# Patient Record
Sex: MALE | Race: WHITE | NOT HISPANIC OR LATINO | Employment: OTHER | URBAN - METROPOLITAN AREA
[De-identification: names, ages, dates, MRNs, and addresses within clinical notes are randomized per-mention and may not be internally consistent; named-entity substitution may affect disease eponyms.]

---

## 2018-02-24 PROBLEM — E11.628 TYPE 2 DIABETES MELLITUS WITH RIGHT DIABETIC FOOT INFECTION: Status: ACTIVE | Noted: 2018-02-24

## 2018-02-24 PROBLEM — R68.89 SUSPECTED SOFT TISSUE INFECTION: Status: ACTIVE | Noted: 2018-02-24

## 2018-02-24 PROBLEM — L97.509 DIABETIC FOOT ULCER ASSOCIATED WITH TYPE 2 DIABETES MELLITUS: Status: ACTIVE | Noted: 2018-02-24

## 2018-02-24 PROBLEM — E11.621 DIABETIC FOOT ULCER ASSOCIATED WITH TYPE 2 DIABETES MELLITUS: Status: ACTIVE | Noted: 2018-02-24

## 2018-02-24 PROBLEM — L08.9 TYPE 2 DIABETES MELLITUS WITH RIGHT DIABETIC FOOT INFECTION: Status: ACTIVE | Noted: 2018-02-24

## 2018-02-26 PROBLEM — M86.271 SUBACUTE OSTEOMYELITIS OF RIGHT FOOT: Status: ACTIVE | Noted: 2018-02-26

## 2018-03-02 PROBLEM — L03.115 CELLULITIS OF RIGHT FOOT: Status: ACTIVE | Noted: 2018-02-24

## 2018-12-23 ENCOUNTER — HOSPITAL ENCOUNTER (INPATIENT)
Facility: HOSPITAL | Age: 58
LOS: 17 days | DRG: 853 | End: 2019-01-09
Attending: EMERGENCY MEDICINE | Admitting: HOSPITALIST
Payer: MEDICAID

## 2018-12-23 DIAGNOSIS — M86.171 ACUTE OSTEOMYELITIS OF TOE, RIGHT: ICD-10-CM

## 2018-12-23 DIAGNOSIS — L03.115 CELLULITIS OF RIGHT FOOT: ICD-10-CM

## 2018-12-23 DIAGNOSIS — A41.9 SEPSIS: ICD-10-CM

## 2018-12-23 DIAGNOSIS — E11.621 DIABETIC ULCER OF RIGHT GREAT TOE: ICD-10-CM

## 2018-12-23 DIAGNOSIS — L03.90 CELLULITIS, UNSPECIFIED CELLULITIS SITE: ICD-10-CM

## 2018-12-23 DIAGNOSIS — M86.271 SUBACUTE OSTEOMYELITIS OF RIGHT FOOT: ICD-10-CM

## 2018-12-23 DIAGNOSIS — L08.9 TYPE 2 DIABETES MELLITUS WITH RIGHT DIABETIC FOOT INFECTION: ICD-10-CM

## 2018-12-23 DIAGNOSIS — L97.519 DIABETIC ULCER OF RIGHT GREAT TOE: ICD-10-CM

## 2018-12-23 DIAGNOSIS — E11.628 TYPE 2 DIABETES MELLITUS WITH RIGHT DIABETIC FOOT INFECTION: ICD-10-CM

## 2018-12-23 DIAGNOSIS — M86.9 OSTEOMYELITIS: ICD-10-CM

## 2018-12-23 DIAGNOSIS — A41.9 SEPSIS, DUE TO UNSPECIFIED ORGANISM: Primary | ICD-10-CM

## 2018-12-23 PROBLEM — R79.89 ELEVATED LACTIC ACID LEVEL: Status: ACTIVE | Noted: 2018-12-23

## 2018-12-23 LAB
ALBUMIN SERPL BCP-MCNC: 3.1 G/DL
ALP SERPL-CCNC: 80 U/L
ALT SERPL W/O P-5'-P-CCNC: 16 U/L
ANION GAP SERPL CALC-SCNC: 11 MMOL/L
AST SERPL-CCNC: 19 U/L
BACTERIA #/AREA URNS HPF: ABNORMAL /HPF
BASOPHILS # BLD AUTO: 0.02 K/UL
BASOPHILS NFR BLD: 0.2 %
BILIRUB SERPL-MCNC: 0.6 MG/DL
BILIRUB UR QL STRIP: NEGATIVE
BUN SERPL-MCNC: 29 MG/DL
CALCIUM SERPL-MCNC: 9.3 MG/DL
CHLORIDE SERPL-SCNC: 104 MMOL/L
CLARITY UR: CLEAR
CO2 SERPL-SCNC: 20 MMOL/L
COLOR UR: YELLOW
CREAT SERPL-MCNC: 1.4 MG/DL
CRP SERPL-MCNC: 83 MG/L
CTP QC/QA: YES
DIFFERENTIAL METHOD: ABNORMAL
EOSINOPHIL # BLD AUTO: 0 K/UL
EOSINOPHIL NFR BLD: 0.1 %
ERYTHROCYTE [DISTWIDTH] IN BLOOD BY AUTOMATED COUNT: 12.8 %
ERYTHROCYTE [SEDIMENTATION RATE] IN BLOOD BY WESTERGREN METHOD: 58 MM/HR
EST. GFR  (AFRICAN AMERICAN): >60 ML/MIN/1.73 M^2
EST. GFR  (NON AFRICAN AMERICAN): 55 ML/MIN/1.73 M^2
FLUAV AG NPH QL: NEGATIVE
FLUBV AG NPH QL: NEGATIVE
GLUCOSE SERPL-MCNC: 349 MG/DL
GLUCOSE UR QL STRIP: ABNORMAL
GRAN CASTS #/AREA URNS LPF: 1 /LPF
HCT VFR BLD AUTO: 37.9 %
HGB BLD-MCNC: 13.1 G/DL
HGB UR QL STRIP: ABNORMAL
HYALINE CASTS #/AREA URNS LPF: 3 /LPF
KETONES UR QL STRIP: NEGATIVE
LACTATE SERPL-SCNC: 2.1 MMOL/L
LEUKOCYTE ESTERASE UR QL STRIP: NEGATIVE
LYMPHOCYTES # BLD AUTO: 1.1 K/UL
LYMPHOCYTES NFR BLD: 8.4 %
MCH RBC QN AUTO: 29.4 PG
MCHC RBC AUTO-ENTMCNC: 34.6 G/DL
MCV RBC AUTO: 85 FL
MICROSCOPIC COMMENT: ABNORMAL
MONOCYTES # BLD AUTO: 1.3 K/UL
MONOCYTES NFR BLD: 10 %
NEUTROPHILS # BLD AUTO: 10.2 K/UL
NEUTROPHILS NFR BLD: 81.3 %
NITRITE UR QL STRIP: NEGATIVE
PH UR STRIP: 5 [PH] (ref 5–8)
PLATELET # BLD AUTO: 294 K/UL
PMV BLD AUTO: 10.2 FL
POCT GLUCOSE: 287 MG/DL (ref 70–110)
POCT GLUCOSE: 317 MG/DL (ref 70–110)
POTASSIUM SERPL-SCNC: 4.6 MMOL/L
PROT SERPL-MCNC: 7.4 G/DL
PROT UR QL STRIP: ABNORMAL
RBC # BLD AUTO: 4.46 M/UL
RBC #/AREA URNS HPF: 5 /HPF (ref 0–4)
SODIUM SERPL-SCNC: 135 MMOL/L
SP GR UR STRIP: 1.02 (ref 1–1.03)
SQUAMOUS #/AREA URNS HPF: 2 /HPF
URN SPEC COLLECT METH UR: ABNORMAL
UROBILINOGEN UR STRIP-ACNC: NEGATIVE EU/DL
WBC # BLD AUTO: 12.55 K/UL
WBC #/AREA URNS HPF: 3 /HPF (ref 0–5)
YEAST URNS QL MICRO: ABNORMAL

## 2018-12-23 PROCEDURE — 96361 HYDRATE IV INFUSION ADD-ON: CPT

## 2018-12-23 PROCEDURE — 80053 COMPREHEN METABOLIC PANEL: CPT

## 2018-12-23 PROCEDURE — 85025 COMPLETE CBC W/AUTO DIFF WBC: CPT

## 2018-12-23 PROCEDURE — 86140 C-REACTIVE PROTEIN: CPT

## 2018-12-23 PROCEDURE — 36415 COLL VENOUS BLD VENIPUNCTURE: CPT

## 2018-12-23 PROCEDURE — 87040 BLOOD CULTURE FOR BACTERIA: CPT

## 2018-12-23 PROCEDURE — 81000 URINALYSIS NONAUTO W/SCOPE: CPT

## 2018-12-23 PROCEDURE — 85652 RBC SED RATE AUTOMATED: CPT

## 2018-12-23 PROCEDURE — 99285 EMERGENCY DEPT VISIT HI MDM: CPT | Mod: 25

## 2018-12-23 PROCEDURE — 83605 ASSAY OF LACTIC ACID: CPT

## 2018-12-23 PROCEDURE — 11000001 HC ACUTE MED/SURG PRIVATE ROOM

## 2018-12-23 PROCEDURE — 93010 EKG 12-LEAD: ICD-10-PCS | Mod: ,,, | Performed by: INTERNAL MEDICINE

## 2018-12-23 PROCEDURE — 63600175 PHARM REV CODE 636 W HCPCS: Performed by: EMERGENCY MEDICINE

## 2018-12-23 PROCEDURE — 25000003 PHARM REV CODE 250: Performed by: EMERGENCY MEDICINE

## 2018-12-23 PROCEDURE — A4216 STERILE WATER/SALINE, 10 ML: HCPCS | Performed by: EMERGENCY MEDICINE

## 2018-12-23 PROCEDURE — 93005 ELECTROCARDIOGRAM TRACING: CPT

## 2018-12-23 PROCEDURE — 96375 TX/PRO/DX INJ NEW DRUG ADDON: CPT

## 2018-12-23 PROCEDURE — 93010 ELECTROCARDIOGRAM REPORT: CPT | Mod: ,,, | Performed by: INTERNAL MEDICINE

## 2018-12-23 PROCEDURE — 82962 GLUCOSE BLOOD TEST: CPT

## 2018-12-23 PROCEDURE — 96365 THER/PROPH/DIAG IV INF INIT: CPT

## 2018-12-23 RX ORDER — METFORMIN HYDROCHLORIDE 500 MG/1
500 TABLET ORAL 2 TIMES DAILY WITH MEALS
COMMUNITY
End: 2018-12-23

## 2018-12-23 RX ORDER — GABAPENTIN 300 MG/1
300 CAPSULE ORAL 3 TIMES DAILY
COMMUNITY
End: 2023-04-22 | Stop reason: CLARIF

## 2018-12-23 RX ORDER — ACETAMINOPHEN 325 MG/1
650 TABLET ORAL EVERY 8 HOURS PRN
Status: DISCONTINUED | OUTPATIENT
Start: 2018-12-23 | End: 2018-12-24

## 2018-12-23 RX ORDER — SODIUM CHLORIDE 0.9 % (FLUSH) 0.9 %
3 SYRINGE (ML) INJECTION EVERY 8 HOURS
Status: DISCONTINUED | OUTPATIENT
Start: 2018-12-23 | End: 2018-12-28

## 2018-12-23 RX ORDER — ONDANSETRON 8 MG/1
8 TABLET, ORALLY DISINTEGRATING ORAL EVERY 8 HOURS PRN
Status: DISCONTINUED | OUTPATIENT
Start: 2018-12-23 | End: 2019-01-09 | Stop reason: HOSPADM

## 2018-12-23 RX ORDER — TAMSULOSIN HYDROCHLORIDE 0.4 MG/1
0.4 CAPSULE ORAL DAILY
COMMUNITY
End: 2023-04-22 | Stop reason: CLARIF

## 2018-12-23 RX ORDER — LISINOPRIL 20 MG/1
20 TABLET ORAL DAILY
COMMUNITY
End: 2023-04-22 | Stop reason: CLARIF

## 2018-12-23 RX ADMIN — Medication 3 ML: at 10:12

## 2018-12-23 RX ADMIN — SODIUM CHLORIDE 2817 ML: 0.9 INJECTION, SOLUTION INTRAVENOUS at 07:12

## 2018-12-23 RX ADMIN — VANCOMYCIN HYDROCHLORIDE 2000 MG: 1 INJECTION, POWDER, LYOPHILIZED, FOR SOLUTION INTRAVENOUS at 08:12

## 2018-12-23 RX ADMIN — PIPERACILLIN AND TAZOBACTAM 4.5 G: 4; .5 INJECTION, POWDER, LYOPHILIZED, FOR SOLUTION INTRAVENOUS; PARENTERAL at 08:12

## 2018-12-24 ENCOUNTER — ANESTHESIA EVENT (OUTPATIENT)
Dept: SURGERY | Facility: HOSPITAL | Age: 58
DRG: 853 | End: 2018-12-24
Payer: MEDICAID

## 2018-12-24 ENCOUNTER — ANESTHESIA (OUTPATIENT)
Dept: SURGERY | Facility: HOSPITAL | Age: 58
DRG: 853 | End: 2018-12-24
Payer: MEDICAID

## 2018-12-24 PROBLEM — A41.9 SEPSIS: Status: ACTIVE | Noted: 2018-12-24

## 2018-12-24 PROBLEM — E66.01 MORBID OBESITY DUE TO EXCESS CALORIES: Status: ACTIVE | Noted: 2018-12-24

## 2018-12-24 LAB
ALBUMIN SERPL BCP-MCNC: 2.7 G/DL
ALP SERPL-CCNC: 73 U/L
ALT SERPL W/O P-5'-P-CCNC: 17 U/L
ANION GAP SERPL CALC-SCNC: 7 MMOL/L
AST SERPL-CCNC: 18 U/L
BASOPHILS # BLD AUTO: 0.04 K/UL
BASOPHILS NFR BLD: 0.4 %
BILIRUB SERPL-MCNC: 0.6 MG/DL
BUN SERPL-MCNC: 22 MG/DL
CALCIUM SERPL-MCNC: 8.8 MG/DL
CHLORIDE SERPL-SCNC: 107 MMOL/L
CO2 SERPL-SCNC: 23 MMOL/L
CREAT SERPL-MCNC: 1 MG/DL
DIFFERENTIAL METHOD: ABNORMAL
EOSINOPHIL # BLD AUTO: 0.1 K/UL
EOSINOPHIL NFR BLD: 0.8 %
ERYTHROCYTE [DISTWIDTH] IN BLOOD BY AUTOMATED COUNT: 12.8 %
EST. GFR  (AFRICAN AMERICAN): >60 ML/MIN/1.73 M^2
EST. GFR  (NON AFRICAN AMERICAN): >60 ML/MIN/1.73 M^2
GLUCOSE SERPL-MCNC: 251 MG/DL
HCT VFR BLD AUTO: 34.9 %
HGB BLD-MCNC: 11.5 G/DL
LACTATE SERPL-SCNC: 1.6 MMOL/L
LYMPHOCYTES # BLD AUTO: 2.4 K/UL
LYMPHOCYTES NFR BLD: 26.3 %
MCH RBC QN AUTO: 28.6 PG
MCHC RBC AUTO-ENTMCNC: 33 G/DL
MCV RBC AUTO: 87 FL
MONOCYTES # BLD AUTO: 1.2 K/UL
MONOCYTES NFR BLD: 12.8 %
NEUTROPHILS # BLD AUTO: 5.5 K/UL
NEUTROPHILS NFR BLD: 59.6 %
PLATELET # BLD AUTO: 297 K/UL
PMV BLD AUTO: 10.9 FL
POCT GLUCOSE: 141 MG/DL (ref 70–110)
POCT GLUCOSE: 229 MG/DL (ref 70–110)
POCT GLUCOSE: 299 MG/DL (ref 70–110)
POCT GLUCOSE: 304 MG/DL (ref 70–110)
POTASSIUM SERPL-SCNC: 3.5 MMOL/L
PROT SERPL-MCNC: 6.3 G/DL
RBC # BLD AUTO: 4.02 M/UL
SODIUM SERPL-SCNC: 137 MMOL/L
WBC # BLD AUTO: 9.14 K/UL

## 2018-12-24 PROCEDURE — 25000003 PHARM REV CODE 250: Performed by: EMERGENCY MEDICINE

## 2018-12-24 PROCEDURE — 63600175 PHARM REV CODE 636 W HCPCS: Performed by: HOSPITALIST

## 2018-12-24 PROCEDURE — A4216 STERILE WATER/SALINE, 10 ML: HCPCS | Performed by: EMERGENCY MEDICINE

## 2018-12-24 PROCEDURE — 88311 DECALCIFY TISSUE: CPT | Mod: 26,,, | Performed by: PATHOLOGY

## 2018-12-24 PROCEDURE — 87075 CULTR BACTERIA EXCEPT BLOOD: CPT

## 2018-12-24 PROCEDURE — 88305 TISSUE EXAM BY PATHOLOGIST: CPT | Mod: 26,,, | Performed by: PATHOLOGY

## 2018-12-24 PROCEDURE — 87206 SMEAR FLUORESCENT/ACID STAI: CPT

## 2018-12-24 PROCEDURE — 25000003 PHARM REV CODE 250: Performed by: NURSE ANESTHETIST, CERTIFIED REGISTERED

## 2018-12-24 PROCEDURE — D9220A PRA ANESTHESIA: Mod: CRNA,,, | Performed by: NURSE ANESTHETIST, CERTIFIED REGISTERED

## 2018-12-24 PROCEDURE — 37000009 HC ANESTHESIA EA ADD 15 MINS: Performed by: PODIATRIST

## 2018-12-24 PROCEDURE — 87070 CULTURE OTHR SPECIMN AEROBIC: CPT

## 2018-12-24 PROCEDURE — 63600175 PHARM REV CODE 636 W HCPCS: Performed by: INTERNAL MEDICINE

## 2018-12-24 PROCEDURE — 80053 COMPREHEN METABOLIC PANEL: CPT

## 2018-12-24 PROCEDURE — 37000008 HC ANESTHESIA 1ST 15 MINUTES: Performed by: PODIATRIST

## 2018-12-24 PROCEDURE — 87077 CULTURE AEROBIC IDENTIFY: CPT | Mod: 59

## 2018-12-24 PROCEDURE — 87116 MYCOBACTERIA CULTURE: CPT

## 2018-12-24 PROCEDURE — 28825 PR AMPUTATION TOE,I-P JT: ICD-10-PCS | Mod: T5,,, | Performed by: PODIATRIST

## 2018-12-24 PROCEDURE — 71000033 HC RECOVERY, INTIAL HOUR: Performed by: PODIATRIST

## 2018-12-24 PROCEDURE — 99233 PR SUBSEQUENT HOSPITAL CARE,LEVL III: ICD-10-PCS | Mod: 57,,, | Performed by: PODIATRIST

## 2018-12-24 PROCEDURE — 87147 CULTURE TYPE IMMUNOLOGIC: CPT

## 2018-12-24 PROCEDURE — 87205 SMEAR GRAM STAIN: CPT

## 2018-12-24 PROCEDURE — 36000707: Performed by: PODIATRIST

## 2018-12-24 PROCEDURE — D9220A PRA ANESTHESIA: Mod: ANES,,, | Performed by: ANESTHESIOLOGY

## 2018-12-24 PROCEDURE — 87186 SC STD MICRODIL/AGAR DIL: CPT | Mod: 59

## 2018-12-24 PROCEDURE — 11000001 HC ACUTE MED/SURG PRIVATE ROOM

## 2018-12-24 PROCEDURE — 85025 COMPLETE CBC W/AUTO DIFF WBC: CPT

## 2018-12-24 PROCEDURE — 36000706: Performed by: PODIATRIST

## 2018-12-24 PROCEDURE — 63600175 PHARM REV CODE 636 W HCPCS: Performed by: EMERGENCY MEDICINE

## 2018-12-24 PROCEDURE — D9220A PRA ANESTHESIA: ICD-10-PCS | Mod: CRNA,,, | Performed by: NURSE ANESTHETIST, CERTIFIED REGISTERED

## 2018-12-24 PROCEDURE — 63600175 PHARM REV CODE 636 W HCPCS: Performed by: NURSE ANESTHETIST, CERTIFIED REGISTERED

## 2018-12-24 PROCEDURE — 88305 TISSUE EXAM BY PATHOLOGIST: CPT | Performed by: PATHOLOGY

## 2018-12-24 PROCEDURE — 28825 PARTIAL AMPUTATION OF TOE: CPT | Mod: T5,,, | Performed by: PODIATRIST

## 2018-12-24 PROCEDURE — 88305 TISSUE SPECIMEN TO PATHOLOGY - SURGERY: ICD-10-PCS | Mod: 26,,, | Performed by: PATHOLOGY

## 2018-12-24 PROCEDURE — S5571 INSULIN DISPOS PEN 3 ML: HCPCS | Performed by: INTERNAL MEDICINE

## 2018-12-24 PROCEDURE — 25000003 PHARM REV CODE 250: Performed by: INTERNAL MEDICINE

## 2018-12-24 PROCEDURE — 99233 SBSQ HOSP IP/OBS HIGH 50: CPT | Mod: 57,,, | Performed by: PODIATRIST

## 2018-12-24 PROCEDURE — 25000003 PHARM REV CODE 250: Performed by: HOSPITALIST

## 2018-12-24 PROCEDURE — 88311 TISSUE SPECIMEN TO PATHOLOGY - SURGERY: ICD-10-PCS | Mod: 26,,, | Performed by: PATHOLOGY

## 2018-12-24 PROCEDURE — D9220A PRA ANESTHESIA: ICD-10-PCS | Mod: ANES,,, | Performed by: ANESTHESIOLOGY

## 2018-12-24 PROCEDURE — 36415 COLL VENOUS BLD VENIPUNCTURE: CPT

## 2018-12-24 PROCEDURE — 25000003 PHARM REV CODE 250: Performed by: PODIATRIST

## 2018-12-24 DEVICE — MATRIX REGENERATIVE CLARIX FLO: Type: IMPLANTABLE DEVICE | Site: TOE | Status: FUNCTIONAL

## 2018-12-24 RX ORDER — INSULIN ASPART 100 [IU]/ML
1-10 INJECTION, SOLUTION INTRAVENOUS; SUBCUTANEOUS EVERY 6 HOURS PRN
Status: DISCONTINUED | OUTPATIENT
Start: 2018-12-24 | End: 2018-12-29

## 2018-12-24 RX ORDER — GLYCOPYRROLATE 0.2 MG/ML
INJECTION INTRAMUSCULAR; INTRAVENOUS
Status: DISCONTINUED | OUTPATIENT
Start: 2018-12-24 | End: 2018-12-24

## 2018-12-24 RX ORDER — EPHEDRINE SULFATE 50 MG/ML
INJECTION, SOLUTION INTRAVENOUS
Status: DISCONTINUED | OUTPATIENT
Start: 2018-12-24 | End: 2018-12-24

## 2018-12-24 RX ORDER — SUCCINYLCHOLINE CHLORIDE 20 MG/ML
INJECTION INTRAMUSCULAR; INTRAVENOUS
Status: DISCONTINUED | OUTPATIENT
Start: 2018-12-24 | End: 2018-12-24

## 2018-12-24 RX ORDER — ROCURONIUM BROMIDE 10 MG/ML
INJECTION, SOLUTION INTRAVENOUS
Status: DISCONTINUED | OUTPATIENT
Start: 2018-12-24 | End: 2018-12-24

## 2018-12-24 RX ORDER — MIDAZOLAM HYDROCHLORIDE 1 MG/ML
INJECTION, SOLUTION INTRAMUSCULAR; INTRAVENOUS
Status: DISCONTINUED | OUTPATIENT
Start: 2018-12-24 | End: 2018-12-24

## 2018-12-24 RX ORDER — SODIUM CHLORIDE 9 MG/ML
INJECTION, SOLUTION INTRAVENOUS CONTINUOUS
Status: DISCONTINUED | OUTPATIENT
Start: 2018-12-24 | End: 2018-12-25

## 2018-12-24 RX ORDER — GLUCAGON 1 MG
1 KIT INJECTION
Status: DISCONTINUED | OUTPATIENT
Start: 2018-12-24 | End: 2019-01-09 | Stop reason: HOSPADM

## 2018-12-24 RX ORDER — ACETAMINOPHEN 500 MG
500 TABLET ORAL EVERY 6 HOURS PRN
Status: DISCONTINUED | OUTPATIENT
Start: 2018-12-24 | End: 2019-01-09 | Stop reason: HOSPADM

## 2018-12-24 RX ORDER — TRAMADOL HYDROCHLORIDE 50 MG/1
50 TABLET ORAL EVERY 6 HOURS PRN
Status: DISCONTINUED | OUTPATIENT
Start: 2018-12-24 | End: 2019-01-09 | Stop reason: HOSPADM

## 2018-12-24 RX ORDER — PROPOFOL 10 MG/ML
VIAL (ML) INTRAVENOUS
Status: DISCONTINUED | OUTPATIENT
Start: 2018-12-24 | End: 2018-12-24

## 2018-12-24 RX ORDER — ONDANSETRON 2 MG/ML
INJECTION INTRAMUSCULAR; INTRAVENOUS
Status: DISCONTINUED | OUTPATIENT
Start: 2018-12-24 | End: 2018-12-24

## 2018-12-24 RX ORDER — LIDOCAINE HCL/PF 100 MG/5ML
SYRINGE (ML) INTRAVENOUS
Status: DISCONTINUED | OUTPATIENT
Start: 2018-12-24 | End: 2018-12-24

## 2018-12-24 RX ORDER — PHENYLEPHRINE HYDROCHLORIDE 10 MG/ML
INJECTION INTRAVENOUS
Status: DISCONTINUED | OUTPATIENT
Start: 2018-12-24 | End: 2018-12-24

## 2018-12-24 RX ORDER — FENTANYL CITRATE 50 UG/ML
INJECTION, SOLUTION INTRAMUSCULAR; INTRAVENOUS
Status: DISCONTINUED | OUTPATIENT
Start: 2018-12-24 | End: 2018-12-24

## 2018-12-24 RX ORDER — METOCLOPRAMIDE HYDROCHLORIDE 5 MG/ML
INJECTION INTRAMUSCULAR; INTRAVENOUS
Status: DISCONTINUED | OUTPATIENT
Start: 2018-12-24 | End: 2018-12-24

## 2018-12-24 RX ORDER — NEOSTIGMINE METHYLSULFATE 1 MG/ML
INJECTION, SOLUTION INTRAVENOUS
Status: DISCONTINUED | OUTPATIENT
Start: 2018-12-24 | End: 2018-12-24

## 2018-12-24 RX ADMIN — FENTANYL CITRATE 25 MCG: 50 INJECTION INTRAMUSCULAR; INTRAVENOUS at 02:12

## 2018-12-24 RX ADMIN — METOCLOPRAMIDE 10 MG: 5 INJECTION, SOLUTION INTRAMUSCULAR; INTRAVENOUS at 02:12

## 2018-12-24 RX ADMIN — PIPERACILLIN AND TAZOBACTAM 4.5 G: 4; .5 INJECTION, POWDER, LYOPHILIZED, FOR SOLUTION INTRAVENOUS; PARENTERAL at 10:12

## 2018-12-24 RX ADMIN — GLYCOPYRROLATE 0.6 MG: 0.2 INJECTION, SOLUTION INTRAMUSCULAR; INTRAVENOUS at 03:12

## 2018-12-24 RX ADMIN — ACETAMINOPHEN 500 MG: 500 TABLET, FILM COATED ORAL at 10:12

## 2018-12-24 RX ADMIN — ROCURONIUM BROMIDE 5 MG: 10 INJECTION, SOLUTION INTRAVENOUS at 02:12

## 2018-12-24 RX ADMIN — PIPERACILLIN AND TAZOBACTAM 4.5 G: 4; .5 INJECTION, POWDER, LYOPHILIZED, FOR SOLUTION INTRAVENOUS; PARENTERAL at 12:12

## 2018-12-24 RX ADMIN — PHENYLEPHRINE HYDROCHLORIDE 200 MCG: 10 INJECTION INTRAVENOUS at 03:12

## 2018-12-24 RX ADMIN — Medication 3 ML: at 10:12

## 2018-12-24 RX ADMIN — VANCOMYCIN HYDROCHLORIDE 1500 MG: 1 INJECTION, POWDER, LYOPHILIZED, FOR SOLUTION INTRAVENOUS at 08:12

## 2018-12-24 RX ADMIN — SUCCINYLCHOLINE CHLORIDE 120 MG: 20 INJECTION, SOLUTION INTRAMUSCULAR; INTRAVENOUS at 02:12

## 2018-12-24 RX ADMIN — EPHEDRINE SULFATE 15 MG: 50 INJECTION, SOLUTION INTRAMUSCULAR; INTRAVENOUS; SUBCUTANEOUS at 02:12

## 2018-12-24 RX ADMIN — MIDAZOLAM HYDROCHLORIDE 2 MG: 1 INJECTION, SOLUTION INTRAMUSCULAR; INTRAVENOUS at 02:12

## 2018-12-24 RX ADMIN — EPHEDRINE SULFATE 10 MG: 50 INJECTION, SOLUTION INTRAMUSCULAR; INTRAVENOUS; SUBCUTANEOUS at 03:12

## 2018-12-24 RX ADMIN — Medication 3 ML: at 06:12

## 2018-12-24 RX ADMIN — EPHEDRINE SULFATE 10 MG: 50 INJECTION, SOLUTION INTRAMUSCULAR; INTRAVENOUS; SUBCUTANEOUS at 02:12

## 2018-12-24 RX ADMIN — ROCURONIUM BROMIDE 20 MG: 10 INJECTION, SOLUTION INTRAVENOUS at 02:12

## 2018-12-24 RX ADMIN — INSULIN ASPART 4 UNITS: 100 INJECTION, SOLUTION INTRAVENOUS; SUBCUTANEOUS at 12:12

## 2018-12-24 RX ADMIN — ONDANSETRON 4 MG: 2 INJECTION, SOLUTION INTRAMUSCULAR; INTRAVENOUS at 02:12

## 2018-12-24 RX ADMIN — INSULIN DETEMIR 15 UNITS: 100 INJECTION, SOLUTION SUBCUTANEOUS at 08:12

## 2018-12-24 RX ADMIN — PHENYLEPHRINE HYDROCHLORIDE 200 MCG: 10 INJECTION INTRAVENOUS at 02:12

## 2018-12-24 RX ADMIN — NEOSTIGMINE METHYLSULFATE 5 MG: 1 INJECTION INTRAVENOUS at 03:12

## 2018-12-24 RX ADMIN — FENTANYL CITRATE 25 MCG: 50 INJECTION INTRAMUSCULAR; INTRAVENOUS at 03:12

## 2018-12-24 RX ADMIN — INSULIN ASPART 8 UNITS: 100 INJECTION, SOLUTION INTRAVENOUS; SUBCUTANEOUS at 08:12

## 2018-12-24 RX ADMIN — SODIUM CHLORIDE: 0.9 INJECTION, SOLUTION INTRAVENOUS at 08:12

## 2018-12-24 RX ADMIN — EPHEDRINE SULFATE 5 MG: 50 INJECTION, SOLUTION INTRAMUSCULAR; INTRAVENOUS; SUBCUTANEOUS at 02:12

## 2018-12-24 RX ADMIN — PROPOFOL 180 MG: 10 INJECTION, EMULSION INTRAVENOUS at 02:12

## 2018-12-24 RX ADMIN — PIPERACILLIN AND TAZOBACTAM 4.5 G: 4; .5 INJECTION, POWDER, LYOPHILIZED, FOR SOLUTION INTRAVENOUS; PARENTERAL at 03:12

## 2018-12-24 RX ADMIN — LIDOCAINE HYDROCHLORIDE 100 MG: 20 INJECTION, SOLUTION INTRAVENOUS at 02:12

## 2018-12-24 NOTE — OP NOTE
Incision and Drainage with Distal Hallux Amputation    Surgery Date: 12/24/2018     Surgeon(s) and Role:     * Cary Villanueva DPM - Primary    Assisting Surgeon: None    Pre-op Diagnosis:  Cellulitis of right foot [L03.115]    Post-op Diagnosis:  Post-Op Diagnosis Codes:     * Cellulitis of right foot [L03.115]    Procedure(s) (LRB):  INCISION AND DRAINAGE (Right)  AMPUTATION, TOE PARTIAL RIGHT GREAT TOE (Right)    Anesthesia: Choice    Description of the findings of the procedure: purulence and soft bone of distal phalanx    Findings/Key Components: as above    Estimated Blood Loss:  Less than 5 mL         Specimens:   Specimen (12h ago, onward)    Start     Ordered    12/24/18 1459  Specimen to Pathology - Surgery  Once     Comments:  Right Distal Great ToeProximal Margin Right Great Toe     Start Status   12/24/18 1459 Collected (12/24/18 1459)       12/24/18 1459          Hemostasis: Pneumatic ankle tourniquet     Procedure in Detail:  The patient was seen in the Holding Room. The risks, benefits, complications, treatment options, and expected outcomes were discussed with the patient. The risks and potential complications of their problem and purposed treatment include but are not limited to infection, nerve injury, vascular injury, pain, potential skin necrosis, deep vein thrombosis, possible pulmonary embolus, complications of the anesthetics and need for further amputation.  The patient concurred with the proposed plan, giving informed consent.  The site of surgery properly noted/marked. The patient was taken to Operating Room #2 identified as Khahn Mandy and the procedure verified as incision and drainage of the  right foot with distal hallux amputaiton. A Time Out was held and the above information confirmed.    The patient was brought to the operating room, placed on the operating table in a supine position. Following the successful induction of anesthesia, the foot was then scrubbed, prepped, and  draped in the usual aseptic manner.  The stockinette was then reflected and attention was directed to the hallux  of the right foot where there was a full-thickness ulceration to the distal medial tip of the toe. The toe was ~1.5 times to the normal size and oozing purulence. The patient had dorsalis pedis and posterior tibial pulses that were found to be palpable bilaterally preoperatively. X-ray revealed  A #15 blade was used to make an incision down the bone in a transverse fashion just proximal to the head of the proximal phalanx. The incision was carried mediolaterally and plantarly encompassing the toe leaving a large amount of plantar skin intact. Next, the distal phalanx was disarticulated at the interphalangeal joint and removed. The distal toe was amputated and sent to laboratory for bone culture and sensitivity as well as tissue pathology. Next, the head of the proximal phalanx was inspected and found to be discolored on the distal lateral portion as suspected. Therefore, a bone cutter was used to resect approximately 0.75 cm of the distal aspect of head of the proximal phalanx. This bone was also sent off for path and was labeled proximal margin. Next, the flexor hallucis longus tendon was identified and retracted as far as possible distally and transected.  A hemostat was used to inspect the flexor sheath to ensure no infection tracking up the sheath proximally. None was found. No purulent drainage or abscess remained. The proximal margin of the surgical site tissue was viable and healthy. There was no malodor. Next, 1L of Hibiclens and impregnated saline were instilled into the wound.    All skin was reapproximated and coapted with care utilizing 3-0 prolene in a simple interrupted suture technique. Clarix Cam was injected to the site to decrease scar tissue formation and promote healing. 15 minutes on tourniquet which was deflated prior to closure in order to inspect for bleeders and abscess. The  patient tolerated the above anesthesia and surgery without apparent complications. A standard postoperative dressing was applied consisting of betadine soaked adaptic, gauze, 4x4s, Kerlix,  Cast padding and coban. The patient was transported via cart to Postanesthesia Care Unit with vital signs able and vascular status intact to foot.    He will be heel weightbearing to the right foot and use walker or crutch assistance. He will elevate his rightt foot and rest the foot, keep it clean and dry. If blood cultures are clean and patient remains afebrile, he can be discharged home from podiatric perspective on post op day 2. Podiatry will round on him POD 2 if he is still inpatient. Dressings are to be left clean, dry, and intact until this time.           Implants: none           Complications:  None; patient tolerated the procedure well.           Disposition: PACU - hemodynamically stable. Then back to floor           Condition: stable

## 2018-12-24 NOTE — PLAN OF CARE
Problem: Adult Inpatient Plan of Care  Goal: Plan of Care Review  Outcome: Ongoing (interventions implemented as appropriate)  Patient is A/Ox4, remains free from falls, is afebrile, states no pain.  Pt tolerating IV fluids well. Tolerating diet well, has enormous appetite, is not sated ever, and is difficult to redirect once he his eating.  Patient will eat to the point of choking and vomiting, and will not stop eating without physically removing tray.   Patient requires attention and assistance with meals.  RLE remains elevated with ice, cap refill <3.

## 2018-12-24 NOTE — ANESTHESIA PREPROCEDURE EVALUATION
12/24/2018  Khanh Galvan is a 58 y.o., male.    Anesthesia Evaluation    I have reviewed the Patient Summary Reports.    I have reviewed the Nursing Notes.   I have reviewed the Medications.     Review of Systems  Anesthesia Hx:  No problems with previous Anesthesia Denies Hx of Anesthetic complications  History of prior surgery of interest to airway management or planning: Denies Family Hx of Anesthesia complications.   Denies Personal Hx of Anesthesia complications.   Social:  Non-Smoker    Hematology/Oncology:         -- Anemia: Hematology Comments: Sepsis.   BP improved with fluids, abx.    Cardiovascular:   Hypertension ECG has been reviewed. Exercise tolerance unable to be assessed secondary to primary pathology.   No heart hx per patient.   EKG reviewed.     Musculoskeletal:   Osteomyelitis with sepsis.    Endocrine:   Diabetes, poorly controlled, using insulin        Physical Exam  General:  Well nourished, Obesity    Airway/Jaw/Neck:  Airway Findings: Mouth Opening: Normal Tongue: Normal  General Airway Assessment: Adult  Mallampati: II  TM Distance: Normal, at least 6 cm               Pre-operative evaluation for Procedure(s) (LRB):  INCISION AND DRAINAGE (Right)  AMPUTATION, TOE PARTIAL RIGHT GREAT TOE (Right)    Khanh Galvan is a 58 y.o. male     Patient Active Problem List   Diagnosis    Cellulitis of right foot    Type 2 diabetes mellitus with right diabetic foot infection    Subacute osteomyelitis of right foot    Elevated lactic acid level    Sepsis due to cellulitis    Morbid obesity due to excess calories       Review of patient's allergies indicates:  No Known Allergies    No current facility-administered medications on file prior to encounter.      Current Outpatient Medications on File Prior to Encounter   Medication Sig Dispense Refill    gabapentin (NEURONTIN) 300 MG capsule  Take 300 mg by mouth 3 (three) times daily.      insulin aspart U-100 (NOVOLOG) 100 unit/mL InPn pen Inject 10 Units into the skin 3 (three) times daily. (Patient taking differently: Inject into the skin 2 (two) times daily. ) 1 Box 0    insulin glargine,hum.rec.anlog (BASAGLAR KWIKPEN U-100 INSULIN SUBQ) Inject 40 Units into the skin 2 (two) times daily.       lisinopril (PRINIVIL,ZESTRIL) 20 MG tablet Take 20 mg by mouth once daily.      tamsulosin (FLOMAX) 0.4 mg Cap Take 0.4 mg by mouth once daily.      dextrose 50% injection Inject 50 mLs (25 g total) into the vein as needed (less than 50 mg/dL if patient cannnot take PO but has IV access.). 1 Syringe 10    glucose 4 GM chewable tablet Take 4 tablets (16 g total) by mouth as needed.  12    glucose 4 GM chewable tablet Take 6 tablets (24 g total) by mouth as needed.  12    polyethylene glycol (GLYCOLAX) 17 gram PwPk Take 17 g by mouth once daily. 1 packet 10       Past Surgical History:   Procedure Laterality Date    left foot         Social History     Socioeconomic History    Marital status: Single     Spouse name: Not on file    Number of children: Not on file    Years of education: Not on file    Highest education level: Not on file   Social Needs    Financial resource strain: Not on file    Food insecurity - worry: Not on file    Food insecurity - inability: Not on file    Transportation needs - medical: Not on file    Transportation needs - non-medical: Not on file   Occupational History    Not on file   Tobacco Use    Smoking status: Never Smoker    Smokeless tobacco: Never Used   Substance and Sexual Activity    Alcohol use: No     Comment: occasionally    Drug use: No    Sexual activity: No   Other Topics Concern    Not on file   Social History Narrative    Not on file         CBC:   Recent Labs     12/23/18  1904 12/24/18  0356   WBC 12.55 9.14   RBC 4.46* 4.02*   HGB 13.1* 11.5*   HCT 37.9* 34.9*    297   MCV 85 87    MCH 29.4 28.6   MCHC 34.6 33.0       CMP:   Recent Labs     12/23/18  1904 12/24/18  0356   * 137   K 4.6 3.5    107   CO2 20* 23   BUN 29* 22*   CREATININE 1.4 1.0   * 251*   CALCIUM 9.3 8.8   ALBUMIN 3.1* 2.7*   PROT 7.4 6.3   ALKPHOS 80 73   ALT 16 17   AST 19 18   BILITOT 0.6 0.6           Anesthesia Plan  Type of Anesthesia, risks & benefits discussed:  Anesthesia Type:  general  Patient's Preference:   Intra-op Monitoring Plan: standard ASA monitors  Intra-op Monitoring Plan Comments:   Post Op Pain Control Plan: multimodal analgesia, IV/PO Opioids PRN and per primary service following discharge from PACU  Post Op Pain Control Plan Comments:   Induction:   IV  Beta Blocker:  Patient is not currently on a Beta-Blocker (No further documentation required).       Informed Consent: Patient understands risks and agrees with Anesthesia plan.  Questions answered. Anesthesia consent signed with patient.  ASA Score: 2     Day of Surgery Review of History & Physical: I have interviewed and examined the patient. I have reviewed the patient's H&P dated: 12/24/18.    H&P completed by Anesthesiologist.   Anesthesia Plan Notes: POC glucose in +, will recheck treat intraoperatively/postoperatively.         Ready For Surgery From Anesthesia Perspective.

## 2018-12-24 NOTE — ED TRIAGE NOTES
Pt reports going down the stairs earlier this evening and began feeling weak and had to sit down.  Pt reports when he sat down he could not move his feet.  Pt also stubbed his right great toe on the stair today causing an open wound (1cm x 1.5cm) to bottom of toe. R great toe is swollen, red, with scaly skin peeling.  Pt reports wound to right great toe has only been there since today with swelling x3 days, and redness x 1 month. Redness to right foot marked with skin marker with time and date to monitor spreading.  Pt is poor historian, unable to obtain thorough history.  NAD.  VSS

## 2018-12-24 NOTE — PROGRESS NOTES
"Pt received to unit from ED via stretcher accompanied by transporter x 1 and son, Khanh. Pt AAOx4, respirations even and unlabored on RA. Marked and dated area of swelling and redness noted to R foot and lower leg. Site is warm, edematous, and tender to touch; it extends from R great toe across top of foot and an ulceration is present on the tip of the R great toe. CHANTE, no drainage at this time. Area of abrasion and redness also noted to tops of L toes and lateral L 5th toe. Pt is ambulatory with rolling walker at home. RLE elevated on pillows. Pt denies any pain, reports some peripheral neuropathy to BLE. IVs x 2 CDI, R hand infusing vancomycin. VSSAF. . [place] provided, pt states "I haven't eaten all day." Pt ate sandwich so quickly, he vomited a small amount. Educated pt on taking slow, small bites; pt verbalizes understanding.    Son at bedside, joined by daughter in law Viry. Family to remain at bedside overnight.     Bed locked and low, call bell in reach. Bed alarm active. Educated pt on fall risk and calling for assistance from staff before getting OOB; pt verbalizes understanding. Will continue to monitor.   "

## 2018-12-24 NOTE — ANESTHESIA POSTPROCEDURE EVALUATION
"Anesthesia Post Evaluation    Patient: Khanh Galvan    Procedure(s) Performed: Procedure(s) (LRB):  INCISION AND DRAINAGE (Right)  AMPUTATION, TOE PARTIAL RIGHT GREAT TOE (Right)    Final Anesthesia Type: general  Patient location during evaluation: PACU  Patient participation: Yes- Able to Participate  Level of consciousness: awake and alert  Post-procedure vital signs: reviewed and stable  Pain management: adequate  Airway patency: patent  PONV status at discharge: No PONV  Anesthetic complications: no      Cardiovascular status: blood pressure returned to baseline and hemodynamically stable  Respiratory status: unassisted and spontaneous ventilation  Hydration status: euvolemic  Follow-up not needed.        Visit Vitals  /71   Pulse 82   Temp 36.8 °C (98.2 °F) (Oral)   Resp 20   Ht 5' 11" (1.803 m)   Wt 120.8 kg (266 lb 6.4 oz)   SpO2 (!) 92%   BMI 37.16 kg/m²       Pain/Dell Score: Pain Rating Prior to Med Admin: 0 (12/24/2018  3:42 PM)  Pain Rating Post Med Admin: 0 (12/24/2018  3:42 PM)  Dell Score: 8 (12/24/2018  3:42 PM)        "

## 2018-12-24 NOTE — CONSULTS
"[facility] [place]  Podiatry  Consult Note    Patient Name: Khanh Galvan  MRN: [id number]  Admission Date: 12/23/2018  Hospital Length of Stay: 1 days  Attending Physician: Viry Sher MD  Primary Care Provider: JONY Pena     Inpatient consult to Podiatry  Consult performed by: Cary Villanueva DPM  Consult ordered by: Anthony Velasquez MD  Reason for consult: Diabetic foot lesion with osteomyelitis and cellulitis on right toe with systemic symptoms  Assessment/Recommendations: To OR this afternoon for R distal hallux amputation         Subjective:     History of Present Illness: Khanh Galvan is a 58 y.o. male  has a past medical history of Diabetes mellitus, Hypertension, and Subacute osteomyelitis. referred to podiatry for right hallux ulceration.  Patient is somewhat a poor historian, his daughter-in-law (DIL) aids in history. Patient presented to the ED via EMS following 2-3 days of fatigue, falls, weakness per DIL. They did not know origin of his symptoms, but relate that ~7 days ago he "stubbed his toes" on the stairs while barefoot walking.  DIL states that she noted yesterday that he had "dry blood" on his toes and his foot was red and more swollen than usual. Patient admits to barefoot walking often in and around home    Apparently, patient was recently taken out a nursing home by his son and DIL after ~ 6 months.  He had an ulceration to the plantar 1st MTPJ that required IV abx and wound care.    Since home DIL notices that the patient would get up in the middle of the night and "feast" on soft drinks, candy, chips.  She has recently began locking the fridge at night.  She relates that blood sugar was ranging range from 200-600mg/dL, however they had been working to get it better controlled until this week.          Scheduled Meds:   insulin detemir U-100  15 Units Subcutaneous Daily    piperacillin-tazobactam (ZOSYN) IVPB  4.5 g Intravenous Q8H    sodium chloride 0.9%  " 3 mL Intravenous Q8H    vancomycin (VANCOCIN) IVPB  1,500 mg Intravenous Q12H     Continuous Infusions:   sodium chloride 0.9% 75 mL/hr at 12/24/18 0848     PRN Meds:acetaminophen, dextrose 50%, glucagon (human recombinant), influenza, insulin aspart U-100, ondansetron, pneumoc 13-dutch conj-dip cr(PF)    Review of patient's allergies indicates:  No Known Allergies     Past Medical History:   Diagnosis Date    Diabetes mellitus     Hypertension     Subacute osteomyelitis      Past Surgical History:   Procedure Laterality Date    left foot         Family History     None        Tobacco Use    Smoking status: Never Smoker    Smokeless tobacco: Never Used   Substance and Sexual Activity    Alcohol use: No     Comment: occasionally    Drug use: No    Sexual activity: No     Review of Systems   Constitutional: Positive for fatigue. Negative for activity change, appetite change, chills and fever.   Respiratory: Negative for cough and shortness of breath.    Cardiovascular: Negative for chest pain and leg swelling.   Gastrointestinal: Negative for diarrhea, nausea and vomiting.   Musculoskeletal: Positive for gait problem and myalgias.   Skin: Positive for color change and wound.   Neurological: Positive for weakness and numbness.        + paresthesia      Objective:     Vital Signs (Most Recent):  Temp: 99.8 °F (37.7 °C) (12/24/18 0750)  Pulse: 87 (12/24/18 0750)  Resp: 18 (12/24/18 0750)  BP: (!) 119/59 (12/24/18 0750)  SpO2: 96 % (12/24/18 0750) Vital Signs (24h Range):  Temp:  [98.1 °F (36.7 °C)-99.8 °F (37.7 °C)] 99.8 °F (37.7 °C)  Pulse:  [] 87  Resp:  [18-20] 18  SpO2:  [94 %-96 %] 96 %  BP: (108-136)/(55-95) 119/59     Weight: 120.8 kg (266 lb 6.4 oz)  Body mass index is 37.16 kg/m².    General: Pt. is well-developed, well-nourished, appears stated age, in no acute distress, alert and oriented x 3. No evidence of depression, anxiety, or agitation. Calm, cooperative, and communicative. Appropriate  interactions and affect.    Lower Extremity Examination  Vascular: dorsalis pedis and posterior tibial pulses are palpable bilaterally. Capillary refill time is within normal limits. Edema to the right lower extremity    Neurologic: [place] 5.07 monofilamant testing is diminished [place] feet. Sharp/dull sensation diminished Bilaterally. Light touch diminished Bilaterally.    Musculoskeletal: There is equinus deformity bilateral with decreased dorsiflexion at the ankle joint bilateral. No tenderness with compression of heel. Negative tinels sign. Decreased first MPJ range of motion both weightbearing and nonweightbearing, no crepitus observed the first MP joint, + dorsal flag sign. Mild  bunion deformity is observed. Patient has hammertoes of digits 1-5 bilateral partially reducible     Lymphatics: no lymphangitic streaking bilaterally.    Dermatologic: Skin is warm, digital hair is absent. Abrasion noted to distal left 3rd digit    Ulcer location: right distal hallux as pictured  Signs of infection: local edema and erythema extending proximally as pictured, tender to palpation in the presence of neuropathy  Drainage: Sero- Purulent  Purulence: yes  Crepitus/fluctuance: yes  Periwound: Reddened, Macerated, Calloused  Base: Mixed Granular/Fibrotic  Depth: subcutaneous tissue              Laboratory:  A1C: No results for input(s): HGBA1C in the last 4320 hours.  CBC:   Recent Labs   Lab 12/24/18  0356   WBC 9.14   RBC 4.02*   HGB 11.5*   HCT 34.9*      MCV 87   MCH 28.6   MCHC 33.0     CMP:   Recent Labs   Lab 12/24/18  0356   *   CALCIUM 8.8   ALBUMIN 2.7*   PROT 6.3      K 3.5   CO2 23      BUN 22*   CREATININE 1.0   ALKPHOS 73   ALT 17   AST 18   BILITOT 0.6     CRP:   Recent Labs   Lab 12/23/18  1904   CRP 83.0*     ESR:   Recent Labs   Lab 12/23/18  1904   SEDRATE 58*     Microbiology Results (last 7 days)     Procedure Component Value Units Date/Time    Blood culture x two  cultures. Draw prior to antibiotics. [[id number]] Collected:  12/23/18 1850    Order Status:  Completed Specimen:  Blood from Peripheral, Hand, Left Updated:  12/24/18 0312     Blood Culture, Routine No Growth to date    Narrative:       Aerobic and anaerobic    Blood culture x two cultures. Draw prior to antibiotics. [[id number]] Collected:  12/23/18 1912    Order Status:  Completed Specimen:  Blood from Peripheral, Hand, Right Updated:  12/24/18 0312     Blood Culture, Routine No Growth to date    Narrative:       Aerobic and anaerobic        Specimen (12h ago, onward)    None          Diagnostic Results:  Imaging Results          X-Ray Foot Complete Right (Final result)  Result time 12/23/18 19:53:33    Final result by Rain Delgado MD (12/23/18 19:53:33)                 Impression:      See above.      Electronically signed by: Rain Delgado MD  Date:    12/23/2018  Time:    19:53             Narrative:    EXAMINATION:  XR FOOT COMPLETE 3 VIEW RIGHT    CLINICAL HISTORY:  . Osteomyelitis, unspecified    TECHNIQUE:  AP, lateral, and oblique views of the right foot were performed.    COMPARISON:  None.    FINDINGS:  No evidence of acute fracture or dislocation.  Small ulceration with suspected small amount of soft tissue air seen at the distal aspect of the great toe. No definite osseous erosive or destructive changes to suggest osteomyelitis.  Future MRI follow-up would be more sensitive for detection if clinical concern exists.                               X-Ray Chest AP Portable (Final result)  Result time 12/23/18 19:50:32    Final result by Rain Delgado MD (12/23/18 19:50:32)                 Impression:      Cardiomegaly with suspected mild pulmonary edema.      Electronically signed by: Rain Delgado MD  Date:    12/23/2018  Time:    19:50             Narrative:    EXAMINATION:  XR CHEST AP PORTABLE    CLINICAL HISTORY:  Sepsis;    TECHNIQUE:  Single frontal view of the chest was  performed.    COMPARISON:  03/02/2018.    FINDINGS:  Heart is enlarged but stable in size.  There is prominence of central pulmonary vasculature with mild perihilar opacity and diffuse increased interstitial attenuation most suggestive for mild pulmonary edema.  No evidence of focal consolidation, pneumothorax, or large effusion.  No acute osseous abnormality identified.                                Assessment/Plan:     Active Diagnoses:    Diagnosis Date Noted POA    PRINCIPAL PROBLEM:  Sepsis due to cellulitis [L03.90, A41.9] 12/23/2018 Yes    Morbid obesity due to excess calories [E66.01] 12/24/2018 Yes    Elevated lactic acid level [R79.89] 12/23/2018 Yes    Subacute osteomyelitis of right foot [M86.271] 02/26/2018 Yes    Cellulitis of right foot [L03.115] 02/24/2018 Yes    Type 2 diabetes mellitus with right diabetic foot infection [E11.628, L08.9] 02/24/2018 Yes      Problems Resolved During this Admission:       Greater than 50% of this ~ 32 minute encounter spent discussing differential diagnosis, treatment plan and options, all current labs and imaging.     Education about the prevention of limb loss.    Discussed wound healing cycle, skin integrity, ways to care for skin. Counseled patient and his family on the effects of  high blood glucose on healing. They verbalizes understanding that it can increase the chances of delayed healing and this prolonged exposure leads to infection or progression of infection which subsequently can result in loss of limb.    Adequate vitamin supplementation, protein intake, and hydration - discussed with patient    Full workup to rule out OM including ESR, CRP, and imaging.  Due to labs and clinical appearance of foot, I have high clinical suspicion of acute OM of the distal phalanx    Discussed options for treatment of likely acute OM. Two options were discussed with possible side effects of each. Distal toe amputation vs bone biopsy for C&S IV abx for six weeks  with aggressive wound care for several months with no guarantee of wound resolution or PO abx (if indicated based on culture) for six weeks with aggressive wound care for several months with no guarantee of wound resolution (discussed regular lab work involved with abx options).      Patient would like to proceed with distal amputation/excisional biopsy.  He request that even if proximal phalanx appears infected intraoperatively that I not remove it.  Patient understands that he may still require IV abx if bone still appears infected or proximal margins remain positive for OM.      Will consent prior to surgery (I want to give family time to discuss amputation to the level needed instead of being confined to distal phalanx).     We will continue to follow and work in partnership with treatment team on how to best treat patient concern and diagnosis.    Thank you for your consult.    Cary Villanueva DPM  Podiatry  [facility] [place]

## 2018-12-24 NOTE — H&P
[facility] Medicine  History & Physical    Patient Name: Khanh Galvan  MRN: [id number]  Admission Date: 12/24/2018  Attending Physician: Anthony Velasquez MD, MPH      PCP:     JONY Pena    CC:     Chief Complaint   Patient presents with    Fatigue     pt not feeling well, per EMS pt pale, diaphoretic BP 81/57  RA 89%        HISTORY OF PRESENT ILLNESS:     Khanh Galvan is a 58 y.o. male that (in part)  has a past medical history of Diabetes mellitus, Hypertension, and Subacute osteomyelitis.  has a past surgical history that includes left foot. Presents to [facility] Emergency Department by EMS who was called for fatigue.  When EMS arrived the patient was lethargic and nearly attended.  He was found to be hyperglycemic and hypotensive.  IV fluids were initiated and mental status improved along with his blood pressure.  He was found to have a right-sided diabetic ft lesion.  He reports sustaining injury from stubbing his right great toe on the stairs.  He developed a painful, red, swollen, and warm lesion to his right great toe that has worsened over the last 3 days.  Unfortunately the patient is a poor historian and history is limited.      In the emergency department routine laboratory studies were obtained and x-ray the right foot was performed.  He had elevated ESR, CRP, and physical exam was consistent with diabetic foot lesion with osteomyelitis.  He was given IV fluids and started on empiric antibiotic therapy due to associated cellulitis and concern for possible bacteremia with systemic infection causing infectious encephalopathy.    Hospital medicine has been asked to admit for further evaluation and treatment.       REVIEW OF SYSTEMS:     The available review of system is addressed in the HPI and is otherwise limited due to the condition of the patient.       PAST MEDICAL / SURGICAL HISTORY:     Past Medical History:   Diagnosis  Date    Diabetes mellitus     Hypertension     Subacute osteomyelitis      Past Surgical History:   Procedure Laterality Date    left foot           FAMILY HISTORY:     Unable to obtain family history due to current condition of the patient.      SOCIAL HISTORY:     Social History     Socioeconomic History    Marital status: Single     Spouse name: None    Number of children: None    Years of education: None    Highest education level: None   Social Needs    Financial resource strain: None    Food insecurity - worry: None    Food insecurity - inability: None    Transportation needs - medical: None    Transportation needs - non-medical: None   Occupational History    None   Tobacco Use    Smoking status: Never Smoker    Smokeless tobacco: Never Used   Substance and Sexual Activity    Alcohol use: No     Comment: occasionally    Drug use: No    Sexual activity: No   Other Topics Concern    None   Social History Narrative    None         ALLERGIES:       Review of patient's allergies indicates:  No Known Allergies      HEALTH SCREENING:     Influenza vaccine not up-to-date for this season.  Prevnar 13 pneumonia vaccine = no evidence of previous vaccination found in the medical record      HOME MEDICATIONS:     Prior to Admission medications    Medication Sig Start Date End Date Taking? Authorizing Provider   gabapentin (NEURONTIN) 300 MG capsule Take 300 mg by mouth 3 (three) times daily.   Yes Historical Provider, MD   insulin aspart U-100 (NOVOLOG) 100 unit/mL InPn pen Inject 10 Units into the skin 3 (three) times daily.  Patient taking differently: Inject into the skin 2 (two) times daily.  3/8/18 3/8/19 Yes Jarrell Jaimes MD   insulin glargine,hum.rec.anlog (BASAGLAR KWIKPEN U-100 INSULIN SUBQ) Inject 40 Units into the skin 2 (two) times daily.    Yes Historical Provider, MD   lisinopril (PRINIVIL,ZESTRIL) 20 MG tablet Take 20 mg by mouth once daily.   Yes Historical Provider, MD  "  tamsulosin (FLOMAX) 0.4 mg Cap Take 0.4 mg by mouth once daily.   Yes Historical Provider, MD   dextrose 50% injection Inject 50 mLs (25 g total) into the vein as needed (less than 50 mg/dL if patient cannnot take PO but has IV access.). 3/8/18   Jarrell Jaimes MD   glucose 4 GM chewable tablet Take 4 tablets (16 g total) by mouth as needed. 3/8/18 3/8/19  Jarrell Jaimes MD   glucose 4 GM chewable tablet Take 6 tablets (24 g total) by mouth as needed. 3/8/18 3/8/19  Jarrell Jaimes MD   polyethylene glycol (GLYCOLAX) 17 gram PwPk Take 17 g by mouth once daily. 3/8/18   Jarrell Jaimes MD          HOSPITAL MEDICATIONS:     Scheduled Meds:    piperacillin-tazobactam (ZOSYN) IVPB  4.5 g Intravenous Q8H    sodium chloride 0.9%  3 mL Intravenous Q8H    vancomycin (VANCOCIN) IVPB  1,500 mg Intravenous Q12H     Continuous Infusions:   PRN Meds: acetaminophen, influenza, ondansetron, pneumoc 13-dutch conj-dip cr(PF)      PHYSICAL EXAM:     Wt Readings from Last 1 Encounters:   12/23/18 2200 120.8 kg (266 lb 6.4 oz)   12/23/18 1816 93.9 kg (207 lb)     Body mass index is 37.16 kg/m².  Vitals:    12/23/18 2001 12/23/18 2101 12/23/18 2200 12/23/18 2342   BP: 121/63 114/62 (!) 135/55 108/60   BP Location:  Left arm Left arm Right arm   Patient Position:  Lying Lying Lying   Pulse: 104 104 100 96   Resp:  20 20 18   Temp:  98.5 °F (36.9 °C)  99.1 °F (37.3 °C)   TempSrc:  Oral Oral Oral   SpO2: 95% 95% 96% (!) 94%   Weight:   120.8 kg (266 lb 6.4 oz)    Height:   5' 11" (1.803 m)           -- General appearance: well developed. appears stated age   -- Head: normocephalic, atraumatic   -- Eyes: conjunctivae clear. Extraocular muscles intact  -- Nose: Nares normal. Septum midline.   -- Mouth/Throat: lips, mucosa, and tongue normal. no throat erythema.   -- Neck: supple, symmetrical, trachea midline, no JVD and thyroid not grossly enlarged, appears symmetric  -- Lungs: clear to auscultation bilaterally. normal " respiratory effort. No use of accessory muscles.   -- Chest wall: no tenderness. equal bilateral chest rise   -- Heart:  Rapid rate and regular rhythm. S1, S2 normal.  no click, rub or gallop   -- Abdomen:  Obesity, soft, non-tender, non-distended, non-tympanic; bowel sounds normal; megaly exam limited by body habitus  -- Extremities: no cyanosis, clubbing or edema.   -- Pulses: 2+ and symmetric   -- Skin:  1 cm x 1.5 cm at the 1st metatarsal on the right foot.  Consistent with diabetic foot ulcer.  color normal, texture normal, turgor normal.   -- Neurologic:  With arthritic/encephalopathic.  Globally decreased strength and normal muscle tone. No focal numbness or weakness. CNII-XII intact.     LABORATORY STUDIES:     Recent Results (from the past 36 hour(s))   Blood culture x two cultures. Draw prior to antibiotics.    Collection Time: 12/23/18  6:50 PM   Result Value Ref Range    Blood Culture, Routine No Growth to date    POCT glucose    Collection Time: 12/23/18  7:01 PM   Result Value Ref Range    POCT Glucose 317 (H) 70 - 110 mg/dL   CBC auto differential    Collection Time: 12/23/18  7:04 PM   Result Value Ref Range    WBC 12.55 3.90 - 12.70 K/uL    RBC 4.46 (L) 4.60 - 6.20 M/uL    Hemoglobin 13.1 (L) 14.0 - 18.0 g/dL    Hematocrit 37.9 (L) 40.0 - 54.0 %    MCV 85 82 - 98 fL    MCH 29.4 27.0 - 31.0 pg    MCHC 34.6 32.0 - 36.0 g/dL    RDW 12.8 11.5 - 14.5 %    Platelets 294 150 - 350 K/uL    MPV 10.2 9.2 - 12.9 fL    Gran # (ANC) 10.2 (H) 1.8 - 7.7 K/uL    Lymph # 1.1 1.0 - 4.8 K/uL    Mono # 1.3 (H) 0.3 - 1.0 K/uL    Eos # 0.0 0.0 - 0.5 K/uL    Baso # 0.02 0.00 - 0.20 K/uL    Gran% 81.3 (H) 38.0 - 73.0 %    Lymph% 8.4 (L) 18.0 - 48.0 %    Mono% 10.0 4.0 - 15.0 %    Eosinophil% 0.1 0.0 - 8.0 %    Basophil% 0.2 0.0 - 1.9 %    Differential Method Automated    Comprehensive metabolic panel    Collection Time: 12/23/18  7:04 PM   Result Value Ref Range    Sodium 135 (L) 136 - 145 mmol/L    Potassium 4.6 3.5 - 5.1  mmol/L    Chloride 104 95 - 110 mmol/L    CO2 20 (L) 23 - 29 mmol/L    Glucose 349 (H) 70 - 110 mg/dL    BUN, Bld 29 (H) 6 - 20 mg/dL    Creatinine 1.4 0.5 - 1.4 mg/dL    Calcium 9.3 8.7 - 10.5 mg/dL    Total Protein 7.4 6.0 - 8.4 g/dL    Albumin 3.1 (L) 3.5 - 5.2 g/dL    Total Bilirubin 0.6 0.1 - 1.0 mg/dL    Alkaline Phosphatase 80 55 - 135 U/L    AST 19 10 - 40 U/L    ALT 16 10 - 44 U/L    Anion Gap 11 8 - 16 mmol/L    eGFR if African American >60 >60 mL/min/1.73 m^2    eGFR if non African American 55 (A) >60 mL/min/1.73 m^2   Sedimentation rate    Collection Time: 12/23/18  7:04 PM   Result Value Ref Range    Sed Rate 58 (H) 0 - 10 mm/Hr   C-reactive protein    Collection Time: 12/23/18  7:04 PM   Result Value Ref Range    CRP 83.0 (H) 0.0 - 8.2 mg/L   Blood culture x two cultures. Draw prior to antibiotics.    Collection Time: 12/23/18  7:12 PM   Result Value Ref Range    Blood Culture, Routine No Growth to date    Lactic acid, plasma #1    Collection Time: 12/23/18  7:12 PM   Result Value Ref Range    Lactate (Lactic Acid) 2.1 0.5 - 2.2 mmol/L   Urinalysis, Reflex to Urine Culture Urine, Clean Catch    Collection Time: 12/23/18  7:30 PM   Result Value Ref Range    Specimen UA Urine, Clean Catch     Color, UA Yellow Yellow, Straw, Portia    Appearance, UA Clear Clear    pH, UA 5.0 5.0 - 8.0    Specific Gravity, UA 1.020 1.005 - 1.030    Protein, UA 1+ (A) Negative    Glucose, UA 3+ (A) Negative    Ketones, UA Negative Negative    Bilirubin (UA) Negative Negative    Occult Blood UA 1+ (A) Negative    Nitrite, UA Negative Negative    Urobilinogen, UA Negative <2.0 EU/dL    Leukocytes, UA Negative Negative   Urinalysis Microscopic    Collection Time: 12/23/18  7:30 PM   Result Value Ref Range    RBC, UA 5 (H) 0 - 4 /hpf    WBC, UA 3 0 - 5 /hpf    Bacteria, UA Occasional None-Occ /hpf    Yeast, UA None None    Squam Epithel, UA 2 /hpf    Hyaline Casts, UA 3 (A) 0-1/lpf /lpf    Granular Casts, UA 1 (A) None /lpf     Microscopic Comment SEE COMMENT    POCT Influenza A/B    Collection Time: 12/23/18  8:03 PM   Result Value Ref Range    Rapid Influenza A Ag Negative Negative    Rapid Influenza B Ag Negative Negative     Acceptable Yes    Lactic acid, plasma #2    Collection Time: 12/23/18 11:23 PM   Result Value Ref Range    Lactate (Lactic Acid) 1.6 0.5 - 2.2 mmol/L   POCT glucose    Collection Time: 12/23/18 11:43 PM   Result Value Ref Range    POCT Glucose 287 (H) 70 - 110 mg/dL       Lab Results   Component Value Date    INR 1.0 02/24/2018     Lab Results   Component Value Date    HGBA1C 9.6 (H) 02/25/2018     Recent Labs     12/23/18  1901 12/23/18  2343   POCTGLUCOSE 317* 287*           MICROBIOLOGY DATA:     Urine Culture, Routine   Date Value Ref Range Status   02/24/2018 No growth  Final       Microbiology x 7d:   Microbiology Results (last 7 days)     Procedure Component Value Units Date/Time    Blood culture x two cultures. Draw prior to antibiotics. [[id number]] Collected:  12/23/18 1850    Order Status:  Completed Specimen:  Blood from Peripheral, Hand, Left Updated:  12/24/18 0312     Blood Culture, Routine No Growth to date    Narrative:       Aerobic and anaerobic    Blood culture x two cultures. Draw prior to antibiotics. [[id number]] Collected:  12/23/18 1912    Order Status:  Completed Specimen:  Blood from Peripheral, Hand, Right Updated:  12/24/18 0312     Blood Culture, Routine No Growth to date    Narrative:       Aerobic and anaerobic            IMAGING:     Imaging Results          X-Ray Foot Complete Right (Final result)  Result time 12/23/18 19:53:33    Final result by Rain Delgado MD (12/23/18 19:53:33)                 Impression:      See above.      Electronically signed by: Rain Delgado MD  Date:    12/23/2018  Time:    19:53             Narrative:    EXAMINATION:  XR FOOT COMPLETE 3 VIEW RIGHT    CLINICAL HISTORY:  . Osteomyelitis, unspecified    TECHNIQUE:  AP, lateral, and  oblique views of the right foot were performed.    COMPARISON:  None.    FINDINGS:  No evidence of acute fracture or dislocation.  Small ulceration with suspected small amount of soft tissue air seen at the distal aspect of the great toe. No definite osseous erosive or destructive changes to suggest osteomyelitis.  Future MRI follow-up would be more sensitive for detection if clinical concern exists.                               X-Ray Chest AP Portable (Final result)  Result time 12/23/18 19:50:32    Final result by Rain Delgado MD (12/23/18 19:50:32)                 Impression:      Cardiomegaly with suspected mild pulmonary edema.      Electronically signed by: Rain Delgado MD  Date:    12/23/2018  Time:    19:50             Narrative:    EXAMINATION:  XR CHEST AP PORTABLE    CLINICAL HISTORY:  Sepsis;    TECHNIQUE:  Single frontal view of the chest was performed.    COMPARISON:  03/02/2018.    FINDINGS:  Heart is enlarged but stable in size.  There is prominence of central pulmonary vasculature with mild perihilar opacity and diffuse increased interstitial attenuation most suggestive for mild pulmonary edema.  No evidence of focal consolidation, pneumothorax, or large effusion.  No acute osseous abnormality identified.                                  CONSULTS:     IP CONSULT TO SOCIAL WORK/CASE MANAGEMENT       ASSESSMENT & PLAN:     Primary Diagnosis:  Sepsis due to cellulitis    Active Hospital Problems    Diagnosis  POA    *Sepsis due to cellulitis [L03.90, A41.9]  Yes     Priority: 1 - High    Elevated lactic acid level [R79.89]  Yes     Priority: 2     Subacute osteomyelitis of right foot [M86.271]  Yes    Cellulitis of right foot [L03.115]  Yes    Type 2 diabetes mellitus with right diabetic foot infection [E11.628, L08.9]  Yes      Resolved Hospital Problems   No resolved problems to display.         Sepsis secondary to cellulitis and osteomyelitis the right lower extremity  Complications of  subacute diabetic ft ulceration.  · As evidenced by history, physical exam, imaging, and elevated CRP/ESR  ·  Previous infection with diabetic foot ulcer. May be polymicrobial given the location and nature of the infection.  · Blood cultures will likely be of low yield.    · Ideally a bone biopsy would be obtained prior to initiating antibiotics, however she was given empiric antibiotics in the ED so we will continue given his systemic symptoms.  · Will tailor antibiotic regimen according to culture & sensitivity results, should a bone biopsy be performed  · Maintain euvolemic status with IV fluids  · Consider MRI for further evaluation of extent of bone infection  · Consider consulting infectious disease for further inpatient as well as outpatient follow-up  · Podiatry consulted     Infectious encephalopathy  · Secondary to acute infectious process as outlined above  · Treatment as above  · Supportive care  · Fall precautions    Hyperglycemia secondary uncontrolled Diabetes mellitus type 2  · BG is above acceptable range at this time  · Maintain w/ subcutaneous insulin management order set  · Hold oral diabetic meds  · ADA 1800 kcal diet  · BG goal while in patient is <180mg/dL  · HgA1c = Pending    Morbid obesity due to excess calories  · Body mass index is 37.16 kg/m².  · Order a cardiac diet  · Counseling given  · Nutrition consult as an outpatient        VTE Risk Mitigation (From admission, onward)        Ordered     IP VTE HIGH RISK PATIENT  Once      12/23/18 2159     Place HUGH hose  Until discontinued      12/23/18 2159     Place sequential compression device  Until discontinued      12/23/18 2159            Adult PRN medications available   DVT prophylaxis given       DISPOSITION:     Will admit to the Hospital Medicine service for further evaluation and treatment.    Chart reviewed and updated where applicable.    High Risk Conditions:  Patient has a condition that poses threat to life and bodily function:   Osteomyelitis and cellulitis secondary to diabetic foot lesion      ===============================================================    Anthony Velasquez MD, MPH  Department of Hospital Medicine   [facility]  [phone] pg  (7pm - 6am)          This note is dictated using [employer] voice recognition software.  There are word recognition mistakes that are occasionally missed on review.

## 2018-12-24 NOTE — PLAN OF CARE
Problem: Adult Inpatient Plan of Care  Goal: Plan of Care Review  Outcome: Ongoing (interventions implemented as appropriate)  New admission. Pt did well overnight. RLE elevated on pillows. Redness, warm, and swelling noted to R foot with ulceration on great toe. No drainage noted. Podiatry consulted for this morning. +2 edema to RLE. AAOx4, respirations even and unlabored on RA. VSSAF. IV abx administered as ordered. No c/o pain overnight.

## 2018-12-24 NOTE — NURSING
Patient on telemetry box [id number], confirmed with Terri.  Tele monitor on, HR 88, NSR.  MD (podiatry) in room, consenting for surgery today at 1200.  Patient NPO, educated on importance of NPO, daughter in law at bedside.  NS @75 started per MD. Patient is diabetic, awaiting orders for accuchecks and ss insulin.

## 2018-12-24 NOTE — HPI
Khanh Galvan is a 58 y.o. male that (in part)  has a past medical history of Diabetes mellitus, Hypertension, and Subacute osteomyelitis.  has a past surgical history that includes left foot. Presents to [facility] Emergency [facility] by EMS who was called for fatigue.  When EMS arrived the patient was lethargic and nearly attended.  He was found to be hyperglycemic and hypotensive.  IV fluids were initiated and mental status improved along with his blood pressure.  He was found to have a right-sided diabetic ft lesion.  He reports sustaining injury from stubbing his right great toe on the stairs.  He developed a painful, red, swollen, and warm lesion to his right great toe that has worsened over the last 3 days.  Unfortunately the patient is a poor historian and history is limited.      In the emergency department routine laboratory studies were obtained and x-ray the right foot was performed.  He had elevated ESR, CRP, and physical exam was consistent with diabetic foot lesion with osteomyelitis.  He was given IV fluids and started on empiric antibiotic therapy due to associated cellulitis and concern for possible bacteremia with systemic infection causing infectious encephalopathy.    Hospital medicine has been asked to admit for further evaluation and treatment.

## 2018-12-24 NOTE — TRANSFER OF CARE
"Anesthesia Transfer of Care Note    Patient: Khanh Galvan    Procedure(s) Performed: Procedure(s) (LRB):  INCISION AND DRAINAGE (Right)  AMPUTATION, TOE PARTIAL RIGHT GREAT TOE (Right)    Patient location: PACU    Anesthesia Type: general    Transport from OR: Transported from OR on room air with adequate spontaneous ventilation    Post pain: adequate analgesia    Post assessment: no apparent anesthetic complications and tolerated procedure well    Post vital signs: stable    Level of consciousness: alert and awake    Nausea/Vomiting: no nausea/vomiting    Complications: none    Transfer of care protocol was followed      Last vitals:   Visit Vitals  /71   Pulse 90   Temp 36.8 °C (98.2 °F) (Oral)   Resp 20   Ht 5' 11" (1.803 m)   Wt 120.8 kg (266 lb 6.4 oz)   SpO2 98%   BMI 37.16 kg/m²     "

## 2018-12-24 NOTE — PROGRESS NOTES
Patient to return to floor with orders to ice and elevate surgical limb for the next 24 hours.     He will be heel weightbearing to the right foot and use walker or crutch assistance. He will elevate his rightt foot and rest the foot, keep it clean and dry. If blood cultures are clean and patient remains afebrile, he can be discharged home from podiatric perspective on post op day 2 on PO abx based on cultures with office within 5 (FIVE) days (NO home dressing changes) . Podiatry will round on him POD 2 if he is still inpatient. Dressings are to be left clean, dry, and intact until this time.

## 2018-12-24 NOTE — ED PROVIDER NOTES
"Encounter Date: 12/23/2018    SCRIBE #1 NOTE: I, Delvin Damico KRISTYN, am scribing for, and in the presence of,  Mahesh Norton MD. I have scribed the following portions of the note - Other sections scribed: HPI, ROS.       History     Chief Complaint   Patient presents with    Fatigue     pt not feeling well, per EMS pt pale, diaphoretic BP 81/57  RA 89%      CC: Fatigue     HPI: This 58 y.o. Male presents to the ED c/o acute onset, fatigue with left diabetic foot ulcer x2 days. Son reports the pt stubbed his toe against a staircase pta. He reports the pt's wound has worsened since stubbing his toe. Pt describes his foot pain as "my foot feels crushing". Pt also reports he has had prior foot infection in his right and left foot. Pt reports he never followed with podiatry. EMS reports the pt was incoherent at the scene.  EMS also reports the pt was pale and hypotensive upon arrival. No prior tx attempted. Pt denies nausea, syncope, headache, and dizziness.         The history is provided by the patient. No  was used.     Review of patient's allergies indicates:  No Known Allergies  Past Medical History:   Diagnosis Date    Diabetes mellitus     Hypertension     Subacute osteomyelitis      Past Surgical History:   Procedure Laterality Date    left foot       History reviewed. No pertinent family history.  Social History     Tobacco Use    Smoking status: Never Smoker    Smokeless tobacco: Never Used   Substance Use Topics    Alcohol use: No    Drug use: No     Review of Systems   Constitutional: Positive for fatigue. Negative for chills and fever.   HENT: Negative for congestion, ear pain, rhinorrhea and sore throat.    Eyes: Negative for pain and visual disturbance.   Respiratory: Negative for cough and shortness of breath.    Cardiovascular: Negative for chest pain.   Gastrointestinal: Negative for abdominal pain, diarrhea, nausea and vomiting.   Genitourinary: Negative for " dysuria.   Musculoskeletal: Negative for back pain and neck pain.   Skin: Positive for wound. Negative for rash.   Neurological: Negative for headaches.       Physical Exam     Initial Vitals [12/23/18 1816]   BP Pulse Resp Temp SpO2   (!) 115/57 102 19 98.1 °F (36.7 °C) 96 %      MAP       --         Physical Exam    Nursing note and vitals reviewed.  Constitutional: He appears well-developed and well-nourished. He is not diaphoretic. No distress.   HENT:   Head: Normocephalic and atraumatic.   Mouth/Throat: Oropharynx is clear and moist.   Eyes: Conjunctivae and EOM are normal. Pupils are equal, round, and reactive to light. No scleral icterus.   Neck: Normal range of motion. Neck supple. No JVD present.   Cardiovascular: Normal rate, regular rhythm and intact distal pulses.   Pulmonary/Chest: Breath sounds normal. No stridor. No respiratory distress.   Abdominal: Soft. Bowel sounds are normal. He exhibits no distension. There is no tenderness.   Musculoskeletal: Normal range of motion. He exhibits no edema or tenderness.        Right foot: Comments: Right leg erythematous and swollen with diabetic foot ulcer on 1st metatarsal with drainage   Neurological: He is alert and oriented to person, place, and time. He has normal strength. No cranial nerve deficit.   Skin: Skin is warm and dry. No rash noted.   Psychiatric: He has a normal mood and affect. His behavior is normal.         ED Course   Procedures  Labs Reviewed   CBC W/ AUTO DIFFERENTIAL - Abnormal; Notable for the following components:       Result Value    RBC 4.46 (*)     Hemoglobin 13.1 (*)     Hematocrit 37.9 (*)     Gran # (ANC) 10.2 (*)     Mono # 1.3 (*)     Gran% 81.3 (*)     Lymph% 8.4 (*)     All other components within normal limits   COMPREHENSIVE METABOLIC PANEL - Abnormal; Notable for the following components:    Sodium 135 (*)     CO2 20 (*)     Glucose 349 (*)     BUN, Bld 29 (*)     Albumin 3.1 (*)     eGFR if non  55 (*)      All other components within normal limits   URINALYSIS, REFLEX TO URINE CULTURE - Abnormal; Notable for the following components:    Protein, UA 1+ (*)     Glucose, UA 3+ (*)     Occult Blood UA 1+ (*)     All other components within normal limits    Narrative:     Preferred Collection Type->Urine, Clean Catch   SEDIMENTATION RATE - Abnormal; Notable for the following components:    Sed Rate 58 (*)     All other components within normal limits   C-REACTIVE PROTEIN - Abnormal; Notable for the following components:    CRP 83.0 (*)     All other components within normal limits   URINALYSIS MICROSCOPIC - Abnormal; Notable for the following components:    RBC, UA 5 (*)     Hyaline Casts, UA 3 (*)     Granular Casts, UA 1 (*)     All other components within normal limits    Narrative:     Preferred Collection Type->Urine, Clean Catch   POCT GLUCOSE - Abnormal; Notable for the following components:    POCT Glucose 317 (*)     All other components within normal limits   CULTURE, BLOOD   CULTURE, BLOOD   LACTIC ACID, PLASMA   POCT INFLUENZA A/B     EKG Readings: (Independently Interpreted)   Initial Reading: No STEMI. Rhythm: Normal Sinus Rhythm.   St rate 109 normal interval, non specific st changes,        Imaging Results          X-Ray Foot Complete Right (Final result)  Result time 12/23/18 19:53:33    Final result by Rain Delgado MD (12/23/18 19:53:33)                 Impression:      See above.      Electronically signed by: Rain Delgado MD  Date:    12/23/2018  Time:    19:53             Narrative:    EXAMINATION:  XR FOOT COMPLETE 3 VIEW RIGHT    CLINICAL HISTORY:  . Osteomyelitis, unspecified    TECHNIQUE:  AP, lateral, and oblique views of the right foot were performed.    COMPARISON:  None.    FINDINGS:  No evidence of acute fracture or dislocation.  Small ulceration with suspected small amount of soft tissue air seen at the distal aspect of the great toe. No definite osseous erosive or destructive  changes to suggest osteomyelitis.  Future MRI follow-up would be more sensitive for detection if clinical concern exists.                               X-Ray Chest AP Portable (Final result)  Result time 12/23/18 19:50:32    Final result by Rain Delgado MD (12/23/18 19:50:32)                 Impression:      Cardiomegaly with suspected mild pulmonary edema.      Electronically signed by: Rain Delgado MD  Date:    12/23/2018  Time:    19:50             Narrative:    EXAMINATION:  XR CHEST AP PORTABLE    CLINICAL HISTORY:  Sepsis;    TECHNIQUE:  Single frontal view of the chest was performed.    COMPARISON:  03/02/2018.    FINDINGS:  Heart is enlarged but stable in size.  There is prominence of central pulmonary vasculature with mild perihilar opacity and diffuse increased interstitial attenuation most suggestive for mild pulmonary edema.  No evidence of focal consolidation, pneumothorax, or large effusion.  No acute osseous abnormality identified.                                            Scribe Attestation:   Scribe #1: I performed the above scribed service and the documentation accurately describes the services I performed. I attest to the accuracy of the note.    Attending Attestation:           Physician Attestation for Scribe:  Physician Attestation Statement for Scribe #1: I, Mahesh Norton MD, reviewed documentation, as scribed by Delvin Damico II in my presence, and it is both accurate and complete.     Comments: I, Dr. Mahesh Frank, personally performed the services described in this documentation. All medical record entries made by the scribe were at my direction and in my presence.  I have reviewed the chart and agree that the record reflects my personal performance and is accurate and complete. Mahesh Frank MD.  7:06 PM 12/23/2018    Attending ED Notes:   I, Dr. Mahesh Frank, personally performed the services described in this documentation. All medical record entries made by  the scribe were at my direction and in my presence.  I have reviewed the chart and agree that the record reflects my personal performance and is accurate and complete. Mahesh Frank MD.  8:32 PM 12/23/2018             Clinical Impression:   The primary encounter diagnosis was Sepsis, due to unspecified organism. Diagnoses of Sepsis, Osteomyelitis, and Cellulitis, unspecified cellulitis site were also pertinent to this visit.      Disposition:   Disposition: Admitted  Condition: Stable                        Mahesh Norton MD  12/23/18 2037

## 2018-12-24 NOTE — CARE UPDATE
Agree with my colleague's assessment and plan at time of admission. I personally evaluated Mr Galvan today. Is table. Right foot evidently swollen and erythematous with wound in plantar aspect of great toe. I agree with Podiatry that this is likely osteomyelitis. Agree with broad spectrum abx pending finalized cultures. Will start insulin regimen for better BG control. Plan for OR today. I do not see any contraindications for surgery. Appreciate further recs from Podiatry service.

## 2018-12-24 NOTE — NURSING
1345: Patient transported off unit for surgery.  No apparent distress, pain or SOB.  Transferred to stretcher with no complaint.  IV fluids and antibiotic running, sent with patient.  Tele tech notified.    1715:  Patient transported back to unit from PACU, no apparent distress, no SOB, no stated pain.  RLE elevated, ice in place.  Patient tolerating diet well, although eats very fast, causing himself to choke and vomit.  Patient educated on importance of slowing down and swallowing each bite, patient refuses teaching.      1745: Patient transported off unit to [facility].  No apparent distress, pain or SOB.  Tele tech notified.

## 2018-12-25 LAB
ALBUMIN SERPL BCP-MCNC: 2.6 G/DL
ALP SERPL-CCNC: 66 U/L
ALT SERPL W/O P-5'-P-CCNC: 14 U/L
ANION GAP SERPL CALC-SCNC: 6 MMOL/L
AST SERPL-CCNC: 18 U/L
BASOPHILS # BLD AUTO: 0.03 K/UL
BASOPHILS NFR BLD: 0.4 %
BILIRUB SERPL-MCNC: 0.4 MG/DL
BUN SERPL-MCNC: 16 MG/DL
CALCIUM SERPL-MCNC: 8.7 MG/DL
CHLORIDE SERPL-SCNC: 110 MMOL/L
CO2 SERPL-SCNC: 23 MMOL/L
CREAT SERPL-MCNC: 0.9 MG/DL
DIFFERENTIAL METHOD: ABNORMAL
EOSINOPHIL # BLD AUTO: 0.2 K/UL
EOSINOPHIL NFR BLD: 2.2 %
ERYTHROCYTE [DISTWIDTH] IN BLOOD BY AUTOMATED COUNT: 12.7 %
EST. GFR  (AFRICAN AMERICAN): >60 ML/MIN/1.73 M^2
EST. GFR  (NON AFRICAN AMERICAN): >60 ML/MIN/1.73 M^2
ESTIMATED AVG GLUCOSE: 200 MG/DL
GLUCOSE SERPL-MCNC: 230 MG/DL
GRAM STN SPEC: NORMAL
GRAM STN SPEC: NORMAL
HBA1C MFR BLD HPLC: 8.6 %
HCT VFR BLD AUTO: 35.8 %
HGB BLD-MCNC: 11.8 G/DL
LYMPHOCYTES # BLD AUTO: 1.9 K/UL
LYMPHOCYTES NFR BLD: 23.4 %
MCH RBC QN AUTO: 28.6 PG
MCHC RBC AUTO-ENTMCNC: 33 G/DL
MCV RBC AUTO: 87 FL
MONOCYTES # BLD AUTO: 1 K/UL
MONOCYTES NFR BLD: 11.8 %
NEUTROPHILS # BLD AUTO: 5.1 K/UL
NEUTROPHILS NFR BLD: 62 %
PLATELET # BLD AUTO: 295 K/UL
PMV BLD AUTO: 10.7 FL
POCT GLUCOSE: 186 MG/DL (ref 70–110)
POCT GLUCOSE: 206 MG/DL (ref 70–110)
POCT GLUCOSE: 213 MG/DL (ref 70–110)
POCT GLUCOSE: 222 MG/DL (ref 70–110)
POCT GLUCOSE: 225 MG/DL (ref 70–110)
POTASSIUM SERPL-SCNC: 3.7 MMOL/L
PROT SERPL-MCNC: 6.4 G/DL
RBC # BLD AUTO: 4.13 M/UL
SODIUM SERPL-SCNC: 139 MMOL/L
VANCOMYCIN TROUGH SERPL-MCNC: 13.2 UG/ML
WBC # BLD AUTO: 8.29 K/UL

## 2018-12-25 PROCEDURE — 80202 ASSAY OF VANCOMYCIN: CPT

## 2018-12-25 PROCEDURE — 63600175 PHARM REV CODE 636 W HCPCS: Performed by: HOSPITALIST

## 2018-12-25 PROCEDURE — 25000003 PHARM REV CODE 250: Performed by: EMERGENCY MEDICINE

## 2018-12-25 PROCEDURE — 85025 COMPLETE CBC W/AUTO DIFF WBC: CPT

## 2018-12-25 PROCEDURE — 36415 COLL VENOUS BLD VENIPUNCTURE: CPT

## 2018-12-25 PROCEDURE — 25000003 PHARM REV CODE 250: Performed by: INTERNAL MEDICINE

## 2018-12-25 PROCEDURE — 63600175 PHARM REV CODE 636 W HCPCS: Performed by: EMERGENCY MEDICINE

## 2018-12-25 PROCEDURE — 11000001 HC ACUTE MED/SURG PRIVATE ROOM

## 2018-12-25 PROCEDURE — 80053 COMPREHEN METABOLIC PANEL: CPT

## 2018-12-25 PROCEDURE — 83036 HEMOGLOBIN GLYCOSYLATED A1C: CPT

## 2018-12-25 PROCEDURE — 63600175 PHARM REV CODE 636 W HCPCS: Performed by: INTERNAL MEDICINE

## 2018-12-25 PROCEDURE — 94761 N-INVAS EAR/PLS OXIMETRY MLT: CPT

## 2018-12-25 PROCEDURE — A4216 STERILE WATER/SALINE, 10 ML: HCPCS | Performed by: EMERGENCY MEDICINE

## 2018-12-25 PROCEDURE — 25000003 PHARM REV CODE 250: Performed by: HOSPITALIST

## 2018-12-25 RX ORDER — INSULIN ASPART 100 [IU]/ML
5 INJECTION, SOLUTION INTRAVENOUS; SUBCUTANEOUS
Status: DISCONTINUED | OUTPATIENT
Start: 2018-12-25 | End: 2018-12-26

## 2018-12-25 RX ORDER — LISINOPRIL 20 MG/1
20 TABLET ORAL DAILY
Status: DISCONTINUED | OUTPATIENT
Start: 2018-12-25 | End: 2018-12-27

## 2018-12-25 RX ORDER — ENOXAPARIN SODIUM 100 MG/ML
40 INJECTION SUBCUTANEOUS EVERY 24 HOURS
Status: DISCONTINUED | OUTPATIENT
Start: 2018-12-25 | End: 2019-01-09 | Stop reason: HOSPADM

## 2018-12-25 RX ORDER — SODIUM CHLORIDE 9 MG/ML
INJECTION, SOLUTION INTRAVENOUS CONTINUOUS
Status: DISCONTINUED | OUTPATIENT
Start: 2018-12-25 | End: 2018-12-26

## 2018-12-25 RX ADMIN — LISINOPRIL 20 MG: 20 TABLET ORAL at 08:12

## 2018-12-25 RX ADMIN — INSULIN ASPART 5 UNITS: 100 INJECTION, SOLUTION INTRAVENOUS; SUBCUTANEOUS at 08:12

## 2018-12-25 RX ADMIN — INSULIN ASPART 4 UNITS: 100 INJECTION, SOLUTION INTRAVENOUS; SUBCUTANEOUS at 11:12

## 2018-12-25 RX ADMIN — SODIUM CHLORIDE: 0.9 INJECTION, SOLUTION INTRAVENOUS at 03:12

## 2018-12-25 RX ADMIN — INSULIN ASPART 4 UNITS: 100 INJECTION, SOLUTION INTRAVENOUS; SUBCUTANEOUS at 08:12

## 2018-12-25 RX ADMIN — PIPERACILLIN AND TAZOBACTAM 4.5 G: 4; .5 INJECTION, POWDER, LYOPHILIZED, FOR SOLUTION INTRAVENOUS; PARENTERAL at 05:12

## 2018-12-25 RX ADMIN — PIPERACILLIN AND TAZOBACTAM 4.5 G: 4; .5 INJECTION, POWDER, LYOPHILIZED, FOR SOLUTION INTRAVENOUS; PARENTERAL at 11:12

## 2018-12-25 RX ADMIN — Medication 3 ML: at 06:12

## 2018-12-25 RX ADMIN — INSULIN ASPART 5 UNITS: 100 INJECTION, SOLUTION INTRAVENOUS; SUBCUTANEOUS at 11:12

## 2018-12-25 RX ADMIN — ACETAMINOPHEN 500 MG: 500 TABLET, FILM COATED ORAL at 05:12

## 2018-12-25 RX ADMIN — INSULIN ASPART 3 UNITS: 100 INJECTION, SOLUTION INTRAVENOUS; SUBCUTANEOUS at 12:12

## 2018-12-25 RX ADMIN — VANCOMYCIN HYDROCHLORIDE 1500 MG: 1 INJECTION, POWDER, LYOPHILIZED, FOR SOLUTION INTRAVENOUS at 09:12

## 2018-12-25 RX ADMIN — VANCOMYCIN HYDROCHLORIDE 1500 MG: 1 INJECTION, POWDER, LYOPHILIZED, FOR SOLUTION INTRAVENOUS at 08:12

## 2018-12-25 RX ADMIN — INSULIN ASPART 5 UNITS: 100 INJECTION, SOLUTION INTRAVENOUS; SUBCUTANEOUS at 05:12

## 2018-12-25 RX ADMIN — ENOXAPARIN SODIUM 40 MG: 100 INJECTION SUBCUTANEOUS at 05:12

## 2018-12-25 RX ADMIN — PIPERACILLIN AND TAZOBACTAM 4.5 G: 4; .5 INJECTION, POWDER, LYOPHILIZED, FOR SOLUTION INTRAVENOUS; PARENTERAL at 10:12

## 2018-12-25 RX ADMIN — Medication 3 ML: at 10:12

## 2018-12-25 RX ADMIN — INSULIN ASPART 2 UNITS: 100 INJECTION, SOLUTION INTRAVENOUS; SUBCUTANEOUS at 05:12

## 2018-12-25 RX ADMIN — Medication 3 ML: at 02:12

## 2018-12-25 NOTE — PLAN OF CARE
To patient's room to discuss patient managing his care at home.      TN Role Explained.  Patient identified by using 2 identifiers:  Name and date of birth    Patient stated that his daughter-in-law WILL HELP AT HOME WITH his RECOVERY.      TN name and contact info placed on the communication board    Preferred Pharmacy:  [facility] [place]     12/25/18 123   Discharge Assessment   Assessment Type Discharge Planning Assessment   Confirmed/corrected address and phone number on facesheet? Yes   Assessment information obtained from? Patient   Expected Length of Stay (days) 3   Communicated expected length of stay with patient/caregiver yes   Prior to hospitilization cognitive status: Alert/Oriented   Prior to hospitalization functional status: Independent  (Independent with ADL's)   Current cognitive status: Alert/Oriented   Current Functional Status: Needs Assistance   Lives With child(sergo), adult   Able to Return to Prior Arrangements yes   Is patient able to care for self after discharge? Unable to determine at this time (comments)   Patient's perception of discharge disposition home or selfcare   Readmission Within the Last 30 Days no previous admission in last 30 days   Patient currently being followed by outpatient case management? No   Patient currently receives any other outside agency services? No   Equipment Currently Used at Home walker, rolling   Do you have any problems affording any of your prescribed medications? No   Is the patient taking medications as prescribed? yes   Does the patient have transportation home? Yes   Transportation Anticipated family or friend will provide   Discharge Plan A Home with family   Discharge Plan B Home Health   Patient/Family in Agreement with Plan yes

## 2018-12-25 NOTE — PROGRESS NOTES
[facility] Hospital Medicine  Progress Note    Patient Name: Khanh Galvan  MRN: [id number]  Patient Class: IP- Inpatient   Admission Date: 12/23/2018  Length of Stay: 2 days  Attending Physician: Viry Sher MD  Primary Care Provider: JONY Pena        Subjective:     Principal Problem:Subacute osteomyelitis of right foot    HPI:    Khanh Galvan is a 58 y.o. male that (in part)  has a past medical history of Diabetes mellitus, Hypertension, and Subacute osteomyelitis.  has a past surgical history that includes left foot. Presents to [facility] Emergency Department by EMS who was called for fatigue.  When EMS arrived the patient was lethargic and nearly attended.  He was found to be hyperglycemic and hypotensive.  IV fluids were initiated and mental status improved along with his blood pressure.  He was found to have a right-sided diabetic ft lesion.  He reports sustaining injury from stubbing his right great toe on the stairs.  He developed a painful, red, swollen, and warm lesion to his right great toe that has worsened over the last 3 days.  Unfortunately the patient is a poor historian and history is limited.      In the emergency department routine laboratory studies were obtained and x-ray the right foot was performed.  He had elevated ESR, CRP, and physical exam was consistent with diabetic foot lesion with osteomyelitis.  He was given IV fluids and started on empiric antibiotic therapy due to associated cellulitis and concern for possible bacteremia with systemic infection causing infectious encephalopathy.    Hospital medicine has been asked to admit for further evaluation and treatment.     Hospital Course:  No notes on file    Interval History: feels well. BG still high    Review of Systems   Constitutional: Negative.    Respiratory: Negative.    Cardiovascular: Negative.    Gastrointestinal: Negative.      Objective:     Vital Signs (Most  Recent):  Temp: 98.5 °F (36.9 °C) (12/25/18 1112)  Pulse: 80 (12/25/18 1112)  Resp: 19 (12/25/18 1112)  BP: (!) 156/85 (12/25/18 1112)  SpO2: 95 % (12/25/18 1112) Vital Signs (24h Range):  Temp:  [98 °F (36.7 °C)-99.1 °F (37.3 °C)] 98.5 °F (36.9 °C)  Pulse:  [75-90] 80  Resp:  [18-20] 19  SpO2:  [92 %-100 %] 95 %  BP: (114-179)/(66-88) 156/85     Weight: 120.8 kg (266 lb 6.4 oz)  Body mass index is 37.16 kg/m².    Intake/Output Summary (Last 24 hours) at 12/25/2018 1524  Last data filed at 12/25/2018 1220  Gross per 24 hour   Intake 600 ml   Output 300 ml   Net 300 ml      Physical Exam   Constitutional: He appears well-developed. No distress.   Cardiovascular: Normal rate, regular rhythm and normal heart sounds.   Pulmonary/Chest: Effort normal and breath sounds normal.   Abdominal: Soft. Bowel sounds are normal.   Skin: He is not diaphoretic.   Nursing note and vitals reviewed.      Significant Labs: All pertinent labs within the past 24 hours have been reviewed.    Significant Imaging: I have reviewed all pertinent imaging results/findings within the past 24 hours.  I have reviewed and interpreted all pertinent imaging results/findings within the past 24 hours.    Assessment/Plan:      * Subacute osteomyelitis of right foot    POD 1 partial amputation right toe  Is clinically stable and on broad spectrum coverage  Needs better BG control  BCx NGTD. Bone Cx with moderate GNR and many Strep group B  Continue broad spectrum abx for now         Acute osteomyelitis of toe, right    S/p I&D with partial amputation of right toe     Diabetic ulcer of right great toe           Sepsis           Morbid obesity due to excess calories    Spent > 5 minutes on weight loss education       Elevated lactic acid level    2/2 sepsis  Peripheral pulses normal with adequate resuscitation  Good appetite and PO intake       Type 2 diabetes mellitus with right diabetic foot infection    Poorly controlled  Adjusting insulin regimen for  goal BG < 180       Cellulitis of right foot    With evidence of osteo  Management as above         VTE Risk Mitigation (From admission, onward)        Ordered     IP VTE HIGH RISK PATIENT  Once      12/24/18 1712     Place HUGH hose  Until discontinued      12/23/18 2159     Place sequential compression device  Until discontinued      12/23/18 2159              Viry Smith MD  Department of Hospital Medicine   [facility]

## 2018-12-25 NOTE — PLAN OF CARE
Problem: Adult Inpatient Plan of Care  Goal: Plan of Care Review  Outcome: Ongoing (interventions implemented as appropriate)   12/25/18 6833   Plan of Care Review   Plan of Care Reviewed With patient   Pt AAOx4. Dressing clean, dry and intact on right leg. Leg elevated on pillows, no discoloration . Ice pack applied .  IV patent. Antibiotics infusing. Tolerating well. Afebrile . Bowel sounds active in all 4 quads. C/o pain. Managed with prn meds. Ambulates with assist. Call bell within reach, bed in lowest position. Verbalized understanding to call for assistance when needed. Will cont to monitor...

## 2018-12-25 NOTE — ASSESSMENT & PLAN NOTE
POD 1 partial amputation right toe  Is clinically stable and on broad spectrum coverage  Needs better BG control  BCx NGTD. Bone Cx with moderate GNR and many Strep group B  Continue broad spectrum abx for now

## 2018-12-25 NOTE — PLAN OF CARE
Problem: Adult Inpatient Plan of Care  Goal: Plan of Care Review  Outcome: Ongoing (interventions implemented as appropriate)  Patient is A/Ox4, remains free from falls, is afebrile, states no pain.  RLE remains elevated,  Patient refuses ice.  Sensation and mobility to RLE WNL.  Pt not tolerating diet, states hunger at all times.  Patient is very food focused.  Blood glucose being monitored, ordered insulin given.  Patient tolerating IV fluids and antibiotics well.

## 2018-12-25 NOTE — SUBJECTIVE & OBJECTIVE
Interval History: feels well. BG still high    Review of Systems   Constitutional: Negative.    Respiratory: Negative.    Cardiovascular: Negative.    Gastrointestinal: Negative.      Objective:     Vital Signs (Most Recent):  Temp: 98.5 °F (36.9 °C) (12/25/18 1112)  Pulse: 80 (12/25/18 1112)  Resp: 19 (12/25/18 1112)  BP: (!) 156/85 (12/25/18 1112)  SpO2: 95 % (12/25/18 1112) Vital Signs (24h Range):  Temp:  [98 °F (36.7 °C)-99.1 °F (37.3 °C)] 98.5 °F (36.9 °C)  Pulse:  [75-90] 80  Resp:  [18-20] 19  SpO2:  [92 %-100 %] 95 %  BP: (114-179)/(66-88) 156/85     Weight: 120.8 kg (266 lb 6.4 oz)  Body mass index is 37.16 kg/m².    Intake/Output Summary (Last 24 hours) at 12/25/2018 1524  Last data filed at 12/25/2018 1220  Gross per 24 hour   Intake 600 ml   Output 300 ml   Net 300 ml      Physical Exam   Constitutional: He appears well-developed. No distress.   Cardiovascular: Normal rate, regular rhythm and normal heart sounds.   Pulmonary/Chest: Effort normal and breath sounds normal.   Abdominal: Soft. Bowel sounds are normal.   Skin: He is not diaphoretic.   Nursing note and vitals reviewed.      Significant Labs: All pertinent labs within the past 24 hours have been reviewed.    Significant Imaging: I have reviewed all pertinent imaging results/findings within the past 24 hours.  I have reviewed and interpreted all pertinent imaging results/findings within the past 24 hours.

## 2018-12-26 LAB
ANION GAP SERPL CALC-SCNC: 7 MMOL/L
BUN SERPL-MCNC: 10 MG/DL
CALCIUM SERPL-MCNC: 9.4 MG/DL
CHLORIDE SERPL-SCNC: 107 MMOL/L
CO2 SERPL-SCNC: 23 MMOL/L
CREAT SERPL-MCNC: 0.8 MG/DL
EST. GFR  (AFRICAN AMERICAN): >60 ML/MIN/1.73 M^2
EST. GFR  (NON AFRICAN AMERICAN): >60 ML/MIN/1.73 M^2
GLUCOSE SERPL-MCNC: 191 MG/DL
POCT GLUCOSE: 146 MG/DL (ref 70–110)
POCT GLUCOSE: 187 MG/DL (ref 70–110)
POCT GLUCOSE: 200 MG/DL (ref 70–110)
POCT GLUCOSE: 318 MG/DL (ref 70–110)
POTASSIUM SERPL-SCNC: 3.9 MMOL/L
SODIUM SERPL-SCNC: 137 MMOL/L

## 2018-12-26 PROCEDURE — 25000003 PHARM REV CODE 250: Performed by: INTERNAL MEDICINE

## 2018-12-26 PROCEDURE — 63600175 PHARM REV CODE 636 W HCPCS: Performed by: HOSPITALIST

## 2018-12-26 PROCEDURE — 99024 PR POST-OP FOLLOW-UP VISIT: ICD-10-PCS | Mod: ,,, | Performed by: PODIATRIST

## 2018-12-26 PROCEDURE — 63600175 PHARM REV CODE 636 W HCPCS: Performed by: INTERNAL MEDICINE

## 2018-12-26 PROCEDURE — 99024 POSTOP FOLLOW-UP VISIT: CPT | Mod: ,,, | Performed by: PODIATRIST

## 2018-12-26 PROCEDURE — 80048 BASIC METABOLIC PNL TOTAL CA: CPT

## 2018-12-26 PROCEDURE — 63600175 PHARM REV CODE 636 W HCPCS: Performed by: EMERGENCY MEDICINE

## 2018-12-26 PROCEDURE — 25000003 PHARM REV CODE 250: Performed by: EMERGENCY MEDICINE

## 2018-12-26 PROCEDURE — 99232 PR SUBSEQUENT HOSPITAL CARE,LEVL II: ICD-10-PCS | Mod: ,,, | Performed by: INTERNAL MEDICINE

## 2018-12-26 PROCEDURE — 36569 INSJ PICC 5 YR+ W/O IMAGING: CPT

## 2018-12-26 PROCEDURE — 36415 COLL VENOUS BLD VENIPUNCTURE: CPT

## 2018-12-26 PROCEDURE — 94761 N-INVAS EAR/PLS OXIMETRY MLT: CPT

## 2018-12-26 PROCEDURE — A4216 STERILE WATER/SALINE, 10 ML: HCPCS | Performed by: EMERGENCY MEDICINE

## 2018-12-26 PROCEDURE — 11000001 HC ACUTE MED/SURG PRIVATE ROOM

## 2018-12-26 PROCEDURE — 99232 SBSQ HOSP IP/OBS MODERATE 35: CPT | Mod: ,,, | Performed by: INTERNAL MEDICINE

## 2018-12-26 RX ORDER — SODIUM CHLORIDE 0.9 % (FLUSH) 0.9 %
10 SYRINGE (ML) INJECTION
Status: DISCONTINUED | OUTPATIENT
Start: 2018-12-26 | End: 2019-01-02

## 2018-12-26 RX ORDER — SODIUM CHLORIDE 0.9 % (FLUSH) 0.9 %
10 SYRINGE (ML) INJECTION EVERY 6 HOURS
Status: DISCONTINUED | OUTPATIENT
Start: 2018-12-26 | End: 2019-01-02

## 2018-12-26 RX ORDER — HYDRALAZINE HYDROCHLORIDE 20 MG/ML
10 INJECTION INTRAMUSCULAR; INTRAVENOUS EVERY 6 HOURS PRN
Status: DISCONTINUED | OUTPATIENT
Start: 2018-12-26 | End: 2019-01-09 | Stop reason: HOSPADM

## 2018-12-26 RX ORDER — INSULIN ASPART 100 [IU]/ML
6 INJECTION, SOLUTION INTRAVENOUS; SUBCUTANEOUS
Status: DISCONTINUED | OUTPATIENT
Start: 2018-12-26 | End: 2018-12-28

## 2018-12-26 RX ADMIN — LISINOPRIL 20 MG: 20 TABLET ORAL at 08:12

## 2018-12-26 RX ADMIN — INSULIN ASPART 8 UNITS: 100 INJECTION, SOLUTION INTRAVENOUS; SUBCUTANEOUS at 05:12

## 2018-12-26 RX ADMIN — Medication 3 ML: at 02:12

## 2018-12-26 RX ADMIN — PIPERACILLIN AND TAZOBACTAM 4.5 G: 4; .5 INJECTION, POWDER, LYOPHILIZED, FOR SOLUTION INTRAVENOUS; PARENTERAL at 05:12

## 2018-12-26 RX ADMIN — INSULIN ASPART 2 UNITS: 100 INJECTION, SOLUTION INTRAVENOUS; SUBCUTANEOUS at 08:12

## 2018-12-26 RX ADMIN — INSULIN ASPART 5 UNITS: 100 INJECTION, SOLUTION INTRAVENOUS; SUBCUTANEOUS at 02:12

## 2018-12-26 RX ADMIN — HYDRALAZINE HYDROCHLORIDE 10 MG: 20 INJECTION INTRAMUSCULAR; INTRAVENOUS at 04:12

## 2018-12-26 RX ADMIN — Medication 3 ML: at 10:12

## 2018-12-26 RX ADMIN — TRAMADOL HYDROCHLORIDE 50 MG: 50 TABLET, FILM COATED ORAL at 01:12

## 2018-12-26 RX ADMIN — INSULIN ASPART 6 UNITS: 100 INJECTION, SOLUTION INTRAVENOUS; SUBCUTANEOUS at 05:12

## 2018-12-26 RX ADMIN — Medication 3 ML: at 06:12

## 2018-12-26 RX ADMIN — INSULIN ASPART 2 UNITS: 100 INJECTION, SOLUTION INTRAVENOUS; SUBCUTANEOUS at 02:12

## 2018-12-26 RX ADMIN — INSULIN ASPART 5 UNITS: 100 INJECTION, SOLUTION INTRAVENOUS; SUBCUTANEOUS at 08:12

## 2018-12-26 RX ADMIN — ENOXAPARIN SODIUM 40 MG: 100 INJECTION SUBCUTANEOUS at 05:12

## 2018-12-26 RX ADMIN — ERTAPENEM SODIUM 1 G: 1 INJECTION, POWDER, LYOPHILIZED, FOR SOLUTION INTRAMUSCULAR; INTRAVENOUS at 02:12

## 2018-12-26 NOTE — ASSESSMENT & PLAN NOTE
POD 2 partial amputation right toe  Is clinically stable and on broad spectrum coverage  BG at goal. Adjusting insulin as needed for goal BG <180  BCx NGTD. Bone Cx with moderate Klebsiella ESBL, many Strep group B and a GNR  Abx changed to ertapenem by ID. PICC line placed for 6 weeks of IV abx

## 2018-12-26 NOTE — HPI
Khanh Galvan is a 58 y.o. male that (in part)  has a past medical history of Diabetes mellitus, Hypertension, and Subacute osteomyelitis.  has a past surgical history that includes left foot. Presents to [facility] Emergency Department by EMS who was called for fatigue. When EMS arrived the patient was lethargic and nearly attended.  He was found to be hyperglycemic and hypotensive.  IV fluids were initiated and mental status improved along with his blood pressure.  He was found to have a right-sided diabetic ft lesion.  He reports sustaining injury from stubbing his right great toe on the stairs.  He developed a painful, red, swollen, and warm lesion to his right great toe that has worsened over the last 3 days.  Unfortunately the patient is a poor historian and history is limited.  In the emergency department routine laboratory studies were obtained and x-ray the right foot was performed.  He had elevated ESR, CRP, and physical exam was consistent with diabetic foot lesion with osteomyelitis.  He was given IV fluids and started on empiric antibiotic therapy due to associated cellulitis and concern for possible bacteremia with systemic infection causing infectious encephalopathy.     Hospital Medicine admitted the patient and abx were started. Podiatry performed a partial toe amputation and I am consulted for abx recommendations given an ESBL-producing Kleb on bone culture. Path of proximal bone is pending. The patient denies complaints. Was treated at [facility] in April with IV abx requiring PICC by patient report.

## 2018-12-26 NOTE — NURSING
Patient is on tele monitor [id number], verified with night nurse, good waveform noted on monitor, hr 76, SR on tele

## 2018-12-26 NOTE — PROGRESS NOTES
"TN met with patient at bedside to introduce self as  and to explain duties of case management. TN reviewed  "[facility]", "Discharge and discussed "Help at Home". Patient stated he lives with his son Khanh and daughter in law Viry. Patient stated Viry assists him at home. Patient also stated he is unsure if he will be able to discharge home if he needs wound care. Patient was at [facility] before. TN also discussed his responsibilities to manage his health at home. Patient was informed to leave folder at bedside during hospital stay. Contact information added to white board.      "

## 2018-12-26 NOTE — ASSESSMENT & PLAN NOTE
- s/p partial amputation  - proximal bone debrided, path pending  - culture growing ESBL-Kleb, Strep, and another, yet unidentified, GNB  - change to ertapenem pending final culture results (carbapenems superior to pip/tazo for ESBL-producing GNB)  - PICC ordered  - awaiting final culture results

## 2018-12-26 NOTE — PROCEDURES
"Khanh Galvan is a 58 y.o. male patient.    Temp: 98.4 °F (36.9 °C) (12/26/18 1107)  Pulse: 75 (12/26/18 1107)  Resp: 18 (12/26/18 1107)  BP: (!) 159/86 (12/26/18 1107)  SpO2: 96 % (12/26/18 1107)  Weight: 120.8 kg (266 lb 6.4 oz) (12/23/18 2200)  Height: 5' 11" (180.3 cm) (12/23/18 2200)    PICC  Date/Time: 12/26/2018 12:40 PM  Performed by: Med Lopez RN  Consent Done: Yes  Time out: Immediately prior to procedure a time out was called to verify the correct patient, procedure, equipment, support staff and site/side marked as required  Indications: med administration and vascular access  Anesthesia: local infiltration  Local anesthetic: lidocaine 1% without epinephrine  Anesthetic Total (mL): 2  Preparation: skin prepped with chlorhexidine (without alcohol)  Skin prep agent dried: skin prep agent completely dried prior to procedure  Sterile barriers: all five maximum sterile barriers used - cap, mask, sterile gown, sterile gloves, and large sterile sheet  Hand hygiene: hand hygiene performed prior to central venous catheter insertion  Location details: right basilic  Catheter type: double lumen  Catheter size: 5 Fr  Catheter Length: 44cm    Ultrasound guidance: yes  Vessel Caliber: medium, compressibility normal  Vascular Doppler: not done  Needle advanced into vessel with real time Ultrasound guidance.  Guidewire confirmed in vessel.  Sterile sheath used.  no esophageal manometryNumber of attempts: 1  Post-procedure: blood return through all ports and sterile dressing applied            Med Lopez  12/26/2018  "

## 2018-12-26 NOTE — PLAN OF CARE
Problem: Fall Injury Risk  Goal: Absence of Fall and Fall-Related Injury    Intervention: Identify and Manage Contributors to Fall Injury Risk   12/25/18 2015   Manage Acute Allergic Reaction   Medication Review/Management medications reviewed         Problem: Adult Inpatient Plan of Care  Goal: Plan of Care Review  Oriented patient to environment and plan of care patient verbalized understanding   12/25/18 2627   Plan of Care Review   Plan of Care Reviewed With patient

## 2018-12-26 NOTE — PROGRESS NOTES
[facility] Hospital Medicine  Progress Note    Patient Name: Khanh Galvan  MRN: [id number]  Patient Class: IP- Inpatient   Admission Date: 12/23/2018  Length of Stay: 3 days  Attending Physician: Viry Sher MD  Primary Care Provider: JONY Pena        Subjective:     Principal Problem:Subacute osteomyelitis of right foot    HPI:    Khanh Galvan is a 58 y.o. male that (in part)  has a past medical history of Diabetes mellitus, Hypertension, and Subacute osteomyelitis.  has a past surgical history that includes left foot. Presents to [facility] Emergency Department by EMS who was called for fatigue.  When EMS arrived the patient was lethargic and nearly attended.  He was found to be hyperglycemic and hypotensive.  IV fluids were initiated and mental status improved along with his blood pressure.  He was found to have a right-sided diabetic ft lesion.  He reports sustaining injury from stubbing his right great toe on the stairs.  He developed a painful, red, swollen, and warm lesion to his right great toe that has worsened over the last 3 days.  Unfortunately the patient is a poor historian and history is limited.      In the emergency department routine laboratory studies were obtained and x-ray the right foot was performed.  He had elevated ESR, CRP, and physical exam was consistent with diabetic foot lesion with osteomyelitis.  He was given IV fluids and started on empiric antibiotic therapy due to associated cellulitis and concern for possible bacteremia with systemic infection causing infectious encephalopathy.    Hospital medicine has been asked to admit for further evaluation and treatment.     Hospital Course:  No notes on file    Interval History: feels well. BG better. Wound Cx grew ESBL Kleb. Pending third bacteria ID/sens    Review of Systems   Constitutional: Negative.    Respiratory: Negative.    Cardiovascular: Negative.     Gastrointestinal: Negative.      Objective:     Vital Signs (Most Recent):  Temp: 98.4 °F (36.9 °C) (12/26/18 1107)  Pulse: 75 (12/26/18 1107)  Resp: 18 (12/26/18 1107)  BP: (!) 159/86 (12/26/18 1107)  SpO2: 96 % (12/26/18 1107) Vital Signs (24h Range):  Temp:  [97.5 °F (36.4 °C)-101 °F (38.3 °C)] 98.4 °F (36.9 °C)  Pulse:  [72-82] 75  Resp:  [14-20] 18  SpO2:  [91 %-98 %] 96 %  BP: (135-187)/() 159/86     Weight: 120.8 kg (266 lb 6.4 oz)  Body mass index is 37.16 kg/m².    Intake/Output Summary (Last 24 hours) at 12/26/2018 1551  Last data filed at 12/26/2018 1423  Gross per 24 hour   Intake 600 ml   Output 100 ml   Net 500 ml      Physical Exam   Constitutional: He is oriented to person, place, and time. He appears well-developed. No distress.   Cardiovascular: Normal rate, regular rhythm, normal heart sounds and intact distal pulses.   Pulmonary/Chest: Effort normal and breath sounds normal.   Abdominal: Soft. Bowel sounds are normal.   Neurological: He is alert and oriented to person, place, and time.   Skin: He is not diaphoretic.   Right foot wrapped   Nursing note and vitals reviewed.      Significant Labs: All pertinent labs within the past 24 hours have been reviewed.    Significant Imaging: I have reviewed all pertinent imaging results/findings within the past 24 hours.  I have reviewed and interpreted all pertinent imaging results/findings within the past 24 hours.    Assessment/Plan:      * Subacute osteomyelitis of right foot    POD 2 partial amputation right toe  Is clinically stable and on broad spectrum coverage  BG at goal. Adjusting insulin as needed for goal BG <180  BCx NGTD. Bone Cx with moderate Klebsiella ESBL, many Strep group B and a GNR  Abx changed to ertapenem by ID. PICC line placed for 6 weeks of IV abx           Acute osteomyelitis of toe, right    S/p I&D with partial amputation of right toe     Diabetic ulcer of right great toe           Sepsis           Morbid obesity due to  excess calories    Spent > 5 minutes on weight loss education       Elevated lactic acid level    2/2 sepsis  Peripheral pulses normal with adequate resuscitation  Good appetite and PO intake       Type 2 diabetes mellitus with right diabetic foot infection    Poorly controlled  Adjusting insulin regimen for goal BG < 180       Cellulitis of right foot    With evidence of osteo  Management as above         VTE Risk Mitigation (From admission, onward)        Ordered     enoxaparin injection 40 mg  Daily      12/25/18 1528     IP VTE HIGH RISK PATIENT  Once      12/24/18 1712     Place HUGH hose  Until discontinued      12/23/18 2159     Place sequential compression device  Until discontinued      12/23/18 2159          Patient lives with son, daughter in law and granddaughter who is a baby. Patient and family concern about baby's exposure to ESBL which is reasonable. Will discuss placement with SW for wound care and abx. Will consult PT/OT    Viry Smith MD  Department of Hospital Medicine   [facility]

## 2018-12-26 NOTE — CONSULTS
[facility]  Infectious Disease  Consult Note    Patient Name: Khanh Galvan  MRN: [id number]  Admission Date: 12/23/2018  Hospital Length of Stay: 3 days  Attending Physician: Viry Sher MD  Primary Care Provider: JONY Pena     Isolation Status: No active isolations    Patient information was obtained from patient, past medical records and ER records.      Inpatient consult to Infectious Diseases  Consult performed by: Mike Zamora MD  Consult ordered by: Viry Sher MD        Assessment/Plan:     * Subacute osteomyelitis of right foot    - s/p partial amputation  - proximal bone debrided, path pending  - culture growing ESBL-Kleb, Strep, and another, yet unidentified, GNB  - change to ertapenem pending final culture results (carbapenems superior to pip/tazo for ESBL-producing GNB)  - PICC ordered  - awaiting final culture results         Thank you for your consult. I will follow-up with patient. Please contact us if you have any additional questions.    Mike Zamora MD  Infectious Disease  [facility]    Subjective:     Principal Problem: Subacute osteomyelitis of right foot    HPI: Khanh Galvan is a 58 y.o. male that (in part)  has a past medical history of Diabetes mellitus, Hypertension, and Subacute osteomyelitis.  has a past surgical history that includes left foot. Presents to [facility] Emergency Department by EMS who was called for fatigue. When EMS arrived the patient was lethargic and nearly attended.  He was found to be hyperglycemic and hypotensive.  IV fluids were initiated and mental status improved along with his blood pressure.  He was found to have a right-sided diabetic ft lesion.  He reports sustaining injury from stubbing his right great toe on the stairs.  He developed a painful, red, swollen, and warm lesion to his right great toe that has worsened over the last 3 days.  Unfortunately the  patient is a poor historian and history is limited.  In the emergency department routine laboratory studies were obtained and x-ray the right foot was performed.  He had elevated ESR, CRP, and physical exam was consistent with diabetic foot lesion with osteomyelitis.  He was given IV fluids and started on empiric antibiotic therapy due to associated cellulitis and concern for possible bacteremia with systemic infection causing infectious encephalopathy.     Hospital Medicine admitted the patient and abx were started. Podiatry performed a partial toe amputation and I am consulted for abx recommendations given an ESBL-producing Kleb on bone culture. Path of proximal bone is pending. The patient denies complaints. Was treated at [facility] in April with IV abx requiring PICC by patient report.        Past Medical History:   Diagnosis Date    Diabetes mellitus     Hypertension     Subacute osteomyelitis        Past Surgical History:   Procedure Laterality Date    AMPUTATION, TOE PARTIAL RIGHT GREAT TOE Right 12/24/2018    Per[facility] OR    INCISION AND DRAINAGE Right 12/24/2018    Performed by [facility] at [facility] OR    left foot         Review of patient's allergies indicates:  No Known Allergies    Medications:  Medications Prior to Admission   Medication Sig    gabapentin (NEURONTIN) 300 MG capsule Take 300 mg by mouth 3 (three) times daily.    insulin aspart U-100 (NOVOLOG) 100 unit/mL InPn pen Inject 10 Units into the skin 3 (three) times daily. (Patient taking differently: Inject into the skin 2 (two) times daily. )    insulin glargine,hum.rec.anlog (BASAGLAR KWIKPEN U-100 INSULIN SUBQ) Inject 40 Units into the skin 2 (two) times daily.     lisinopril (PRINIVIL,ZESTRIL) 20 MG tablet Take 20 mg by mouth once daily.    tamsulosin (FLOMAX) 0.4 mg Cap Take 0.4 mg by mouth once daily.    dextrose 50% injection Inject 50 mLs (25 g total) into the vein as needed (less than 50  mg/dL if patient cannnot take PO but has IV access.).    glucose 4 GM chewable tablet Take 4 tablets (16 g total) by mouth as needed.    glucose 4 GM chewable tablet Take 6 tablets (24 g total) by mouth as needed.    polyethylene glycol (GLYCOLAX) 17 gram PwPk Take 17 g by mouth once daily.     Antibiotics (From admission, onward)    Start     Stop Route Frequency Ordered    12/26/18 1130  ertapenem (INVANZ) 1 g in sodium chloride 0.9 % 100 mL IVPB (ready to mix system)      -- IV Every 24 hours (non-standard times) 12/26/18 1029        Antifungals (From admission, onward)    None        Antivirals (From admission, onward)    None           Immunization History   Administered Date(s) Administered    PPD Test 03/02/2018       Family History     None        Social History     Socioeconomic History    Marital status: Single     Spouse name: None    Number of children: None    Years of education: None    Highest education level: None   Social Needs    Financial resource strain: None    Food insecurity - worry: None    Food insecurity - inability: None    Transportation needs - medical: None    Transportation needs - non-medical: None   Occupational History    None   Tobacco Use    Smoking status: Never Smoker    Smokeless tobacco: Never Used   Substance and Sexual Activity    Alcohol use: No     Comment: occasionally    Drug use: No    Sexual activity: No   Other Topics Concern    None   Social History Narrative    None     Review of Systems   Constitutional: Negative for chills and fever.   Skin: Positive for wound.   All other systems reviewed and are negative.    Objective:     Vital Signs (Most Recent):  Temp: 98 °F (36.7 °C) (12/26/18 0742)  Pulse: 75 (12/26/18 0742)  Resp: 18 (12/26/18 0742)  BP: (!) 135/96 (12/26/18 0742)  SpO2: 98 % (12/26/18 0750) Vital Signs (24h Range):  Temp:  [97.5 °F (36.4 °C)-101 °F (38.3 °C)] 98 °F (36.7 °C)  Pulse:  [72-82] 75  Resp:  [14-20] 18  SpO2:  [91 %-98 %]  98 %  BP: (135-187)/() 135/96     Weight: 120.8 kg (266 lb 6.4 oz)  Body mass index is 37.16 kg/m².    Estimated Creatinine Clearance: 131.5 mL/min (based on SCr of 0.8 mg/dL).    Physical Exam   Constitutional: He is oriented to person, place, and time. He appears well-developed and well-nourished. No distress.   HENT:   Head: Normocephalic and atraumatic.   Right Ear: External ear normal.   Left Ear: External ear normal.   Eyes: Conjunctivae and EOM are normal. Pupils are equal, round, and reactive to light.   Cardiovascular: Normal rate, regular rhythm, normal heart sounds and intact distal pulses.   Pulmonary/Chest: Effort normal and breath sounds normal. No stridor. No respiratory distress.   Abdominal: Soft. Bowel sounds are normal. He exhibits no distension. There is no tenderness.   Musculoskeletal: Normal range of motion. He exhibits deformity. He exhibits no edema.   Dressing C/D/I   Neurological: He is alert and oriented to person, place, and time.   Skin: Skin is warm and dry. He is not diaphoretic.   Psychiatric: He has a normal mood and affect. His behavior is normal. Judgment and thought content normal.   Nursing note and vitals reviewed.      Significant Labs:   Blood Culture:   Recent Labs   Lab 12/23/18  1850 12/23/18 1912   LABBLOO No Growth to date  No Growth to date  No Growth to date No Growth to date  No Growth to date  No Growth to date     CBC:   Recent Labs   Lab 12/25/18  0400   WBC 8.29   HGB 11.8*   HCT 35.8*        CMP:   Recent Labs   Lab 12/25/18  0359 12/26/18  0605    137   K 3.7 3.9    107   CO2 23 23   * 191*   BUN 16 10   CREATININE 0.9 0.8   CALCIUM 8.7 9.4   PROT 6.4  --    ALBUMIN 2.6*  --    BILITOT 0.4  --    ALKPHOS 66  --    AST 18  --    ALT 14  --    ANIONGAP 6* 7*   EGFRNONAA >60 >60     Wound Culture:   Recent Labs   Lab 12/24/18  1458   LABAERO Results called to and read back by:Pili Laird-4 natalie  KLEBSIELLA PNEUMONIAE  ESBL  Moderate    STREPTOCOCCUS AGALACTIAE (GROUP B)  Many  Beta-hemolytic streptococci are routinely susceptible to   penicillins,cephalosporins and carbapenems.    GRAM NEGATIVE LILIANA, NON-LACTOSE   Few  Identification and susceptibility pending         Significant Imaging: I have reviewed all pertinent imaging results/findings within the past 24 hours.

## 2018-12-26 NOTE — SUBJECTIVE & OBJECTIVE
Interval History: feels well. BG better. Wound Cx grew ESBL Kleb. Pending third bacteria ID/sens    Review of Systems   Constitutional: Negative.    Respiratory: Negative.    Cardiovascular: Negative.    Gastrointestinal: Negative.      Objective:     Vital Signs (Most Recent):  Temp: 98.4 °F (36.9 °C) (12/26/18 1107)  Pulse: 75 (12/26/18 1107)  Resp: 18 (12/26/18 1107)  BP: (!) 159/86 (12/26/18 1107)  SpO2: 96 % (12/26/18 1107) Vital Signs (24h Range):  Temp:  [97.5 °F (36.4 °C)-101 °F (38.3 °C)] 98.4 °F (36.9 °C)  Pulse:  [72-82] 75  Resp:  [14-20] 18  SpO2:  [91 %-98 %] 96 %  BP: (135-187)/() 159/86     Weight: 120.8 kg (266 lb 6.4 oz)  Body mass index is 37.16 kg/m².    Intake/Output Summary (Last 24 hours) at 12/26/2018 1551  Last data filed at 12/26/2018 1423  Gross per 24 hour   Intake 600 ml   Output 100 ml   Net 500 ml      Physical Exam   Constitutional: He is oriented to person, place, and time. He appears well-developed. No distress.   Cardiovascular: Normal rate, regular rhythm, normal heart sounds and intact distal pulses.   Pulmonary/Chest: Effort normal and breath sounds normal.   Abdominal: Soft. Bowel sounds are normal.   Neurological: He is alert and oriented to person, place, and time.   Skin: He is not diaphoretic.   Right foot wrapped   Nursing note and vitals reviewed.      Significant Labs: All pertinent labs within the past 24 hours have been reviewed.    Significant Imaging: I have reviewed all pertinent imaging results/findings within the past 24 hours.  I have reviewed and interpreted all pertinent imaging results/findings within the past 24 hours.

## 2018-12-26 NOTE — SUBJECTIVE & OBJECTIVE
Past Medical History:   Diagnosis Date    Diabetes mellitus     Hypertension     Subacute osteomyelitis        Past Surgical History:   Procedure Laterality Date    AMPUTATION, TOE PARTIAL RIGHT GREAT TOE Right 12/24/2018    Per[facility] at [facility] OR    INCISION AND DRAINAGE Right 12/24/2018    Performed by [facility] D[facility] at [facility] OR    left foot         Review of patient's allergies indicates:  No Known Allergies    Medications:  Medications Prior to Admission   Medication Sig    gabapentin (NEURONTIN) 300 MG capsule Take 300 mg by mouth 3 (three) times daily.    insulin aspart U-100 (NOVOLOG) 100 unit/mL InPn pen Inject 10 Units into the skin 3 (three) times daily. (Patient taking differently: Inject into the skin 2 (two) times daily. )    insulin glargine,hum.rec.anlog (BASAGLAR KWIKPEN U-100 INSULIN SUBQ) Inject 40 Units into the skin 2 (two) times daily.     lisinopril (PRINIVIL,ZESTRIL) 20 MG tablet Take 20 mg by mouth once daily.    tamsulosin (FLOMAX) 0.4 mg Cap Take 0.4 mg by mouth once daily.    dextrose 50% injection Inject 50 mLs (25 g total) into the vein as needed (less than 50 mg/dL if patient cannnot take PO but has IV access.).    glucose 4 GM chewable tablet Take 4 tablets (16 g total) by mouth as needed.    glucose 4 GM chewable tablet Take 6 tablets (24 g total) by mouth as needed.    polyethylene glycol (GLYCOLAX) 17 gram PwPk Take 17 g by mouth once daily.     Antibiotics (From admission, onward)    Start     Stop Route Frequency Ordered    12/26/18 1130  ertapenem (INVANZ) 1 g in sodium chloride 0.9 % 100 mL IVPB (ready to mix system)      -- IV Every 24 hours (non-standard times) 12/26/18 1029        Antifungals (From admission, onward)    None        Antivirals (From admission, onward)    None           Immunization History   Administered Date(s) Administered    PPD Test 03/02/2018       Family History     None        Social History     Socioeconomic  History    Marital status: Single     Spouse name: None    Number of children: None    Years of education: None    Highest education level: None   Social Needs    Financial resource strain: None    Food insecurity - worry: None    Food insecurity - inability: None    Transportation needs - medical: None    Transportation needs - non-medical: None   Occupational History    None   Tobacco Use    Smoking status: Never Smoker    Smokeless tobacco: Never Used   Substance and Sexual Activity    Alcohol use: No     Comment: occasionally    Drug use: No    Sexual activity: No   Other Topics Concern    None   Social History Narrative    None     Review of Systems   Constitutional: Negative for chills and fever.   Skin: Positive for wound.   All other systems reviewed and are negative.    Objective:     Vital Signs (Most Recent):  Temp: 98 °F (36.7 °C) (12/26/18 0742)  Pulse: 75 (12/26/18 0742)  Resp: 18 (12/26/18 0742)  BP: (!) 135/96 (12/26/18 0742)  SpO2: 98 % (12/26/18 0750) Vital Signs (24h Range):  Temp:  [97.5 °F (36.4 °C)-101 °F (38.3 °C)] 98 °F (36.7 °C)  Pulse:  [72-82] 75  Resp:  [14-20] 18  SpO2:  [91 %-98 %] 98 %  BP: (135-187)/() 135/96     Weight: 120.8 kg (266 lb 6.4 oz)  Body mass index is 37.16 kg/m².    Estimated Creatinine Clearance: 131.5 mL/min (based on SCr of 0.8 mg/dL).    Physical Exam   Constitutional: He is oriented to person, place, and time. He appears well-developed and well-nourished. No distress.   HENT:   Head: Normocephalic and atraumatic.   Right Ear: External ear normal.   Left Ear: External ear normal.   Eyes: Conjunctivae and EOM are normal. Pupils are equal, round, and reactive to light.   Cardiovascular: Normal rate, regular rhythm, normal heart sounds and intact distal pulses.   Pulmonary/Chest: Effort normal and breath sounds normal. No stridor. No respiratory distress.   Abdominal: Soft. Bowel sounds are normal. He exhibits no distension. There is no  tenderness.   Musculoskeletal: Normal range of motion. He exhibits deformity. He exhibits no edema.   Dressing C/D/I   Neurological: He is alert and oriented to person, place, and time.   Skin: Skin is warm and dry. He is not diaphoretic.   Psychiatric: He has a normal mood and affect. His behavior is normal. Judgment and thought content normal.   Nursing note and vitals reviewed.      Significant Labs:   Blood Culture:   Recent Labs   Lab 12/23/18  1850 12/23/18  1912   LABBLOO No Growth to date  No Growth to date  No Growth to date No Growth to date  No Growth to date  No Growth to date     CBC:   Recent Labs   Lab 12/25/18  0400   WBC 8.29   HGB 11.8*   HCT 35.8*        CMP:   Recent Labs   Lab 12/25/18  0359 12/26/18  0605    137   K 3.7 3.9    107   CO2 23 23   * 191*   BUN 16 10   CREATININE 0.9 0.8   CALCIUM 8.7 9.4   PROT 6.4  --    ALBUMIN 2.6*  --    BILITOT 0.4  --    ALKPHOS 66  --    AST 18  --    ALT 14  --    ANIONGAP 6* 7*   EGFRNONAA >60 >60     Wound Culture:   Recent Labs   Lab 12/24/18  1458   LABAERO Results called to and read back by:Pili Laird-Jesus estrada  KLEBSIELLA PNEUMONIAE ESBL  Moderate    STREPTOCOCCUS AGALACTIAE (GROUP B)  Many  Beta-hemolytic streptococci are routinely susceptible to   penicillins,cephalosporins and carbapenems.    GRAM NEGATIVE LILIANA, NON-LACTOSE   Few  Identification and susceptibility pending         Significant Imaging: I have reviewed all pertinent imaging results/findings within the past 24 hours.

## 2018-12-26 NOTE — NURSING
RN at patients bedside trying to start PIV due to no PIV access, Dr Zamora with infectious disease now at bedside stated he is gonna order a PICC line for this patient, and that he also canceled Vancomycin antibiotics

## 2018-12-27 LAB
ANION GAP SERPL CALC-SCNC: 7 MMOL/L
BACTERIA SPEC ANAEROBE CULT: NORMAL
BUN SERPL-MCNC: 14 MG/DL
CALCIUM SERPL-MCNC: 9.2 MG/DL
CHLORIDE SERPL-SCNC: 105 MMOL/L
CO2 SERPL-SCNC: 25 MMOL/L
CREAT SERPL-MCNC: 0.9 MG/DL
EST. GFR  (AFRICAN AMERICAN): >60 ML/MIN/1.73 M^2
EST. GFR  (NON AFRICAN AMERICAN): >60 ML/MIN/1.73 M^2
GLUCOSE SERPL-MCNC: 246 MG/DL
POCT GLUCOSE: 194 MG/DL (ref 70–110)
POCT GLUCOSE: 197 MG/DL (ref 70–110)
POCT GLUCOSE: 216 MG/DL (ref 70–110)
POCT GLUCOSE: 218 MG/DL (ref 70–110)
POTASSIUM SERPL-SCNC: 3.9 MMOL/L
SODIUM SERPL-SCNC: 137 MMOL/L

## 2018-12-27 PROCEDURE — G8978 MOBILITY CURRENT STATUS: HCPCS | Mod: CK

## 2018-12-27 PROCEDURE — 80048 BASIC METABOLIC PNL TOTAL CA: CPT

## 2018-12-27 PROCEDURE — G8987 SELF CARE CURRENT STATUS: HCPCS | Mod: CJ

## 2018-12-27 PROCEDURE — 63600175 PHARM REV CODE 636 W HCPCS: Performed by: INTERNAL MEDICINE

## 2018-12-27 PROCEDURE — 99024 PR POST-OP FOLLOW-UP VISIT: ICD-10-PCS | Mod: ,,, | Performed by: PODIATRIST

## 2018-12-27 PROCEDURE — 97165 OT EVAL LOW COMPLEX 30 MIN: CPT

## 2018-12-27 PROCEDURE — G8988 SELF CARE GOAL STATUS: HCPCS | Mod: CJ

## 2018-12-27 PROCEDURE — G8979 MOBILITY GOAL STATUS: HCPCS | Mod: CJ

## 2018-12-27 PROCEDURE — 97161 PT EVAL LOW COMPLEX 20 MIN: CPT

## 2018-12-27 PROCEDURE — 25000003 PHARM REV CODE 250: Performed by: INTERNAL MEDICINE

## 2018-12-27 PROCEDURE — 99024 POSTOP FOLLOW-UP VISIT: CPT | Mod: ,,, | Performed by: PODIATRIST

## 2018-12-27 PROCEDURE — 11000001 HC ACUTE MED/SURG PRIVATE ROOM

## 2018-12-27 PROCEDURE — 63600175 PHARM REV CODE 636 W HCPCS: Performed by: HOSPITALIST

## 2018-12-27 PROCEDURE — A4216 STERILE WATER/SALINE, 10 ML: HCPCS | Performed by: INTERNAL MEDICINE

## 2018-12-27 RX ORDER — LISINOPRIL 20 MG/1
40 TABLET ORAL DAILY
Status: DISCONTINUED | OUTPATIENT
Start: 2018-12-28 | End: 2019-01-09 | Stop reason: HOSPADM

## 2018-12-27 RX ADMIN — INSULIN ASPART 6 UNITS: 100 INJECTION, SOLUTION INTRAVENOUS; SUBCUTANEOUS at 08:12

## 2018-12-27 RX ADMIN — Medication 10 ML: at 12:12

## 2018-12-27 RX ADMIN — INSULIN ASPART 2 UNITS: 100 INJECTION, SOLUTION INTRAVENOUS; SUBCUTANEOUS at 08:12

## 2018-12-27 RX ADMIN — INSULIN ASPART 6 UNITS: 100 INJECTION, SOLUTION INTRAVENOUS; SUBCUTANEOUS at 12:12

## 2018-12-27 RX ADMIN — HYDRALAZINE HYDROCHLORIDE 10 MG: 20 INJECTION INTRAMUSCULAR; INTRAVENOUS at 05:12

## 2018-12-27 RX ADMIN — ENOXAPARIN SODIUM 40 MG: 100 INJECTION SUBCUTANEOUS at 05:12

## 2018-12-27 RX ADMIN — INSULIN ASPART 6 UNITS: 100 INJECTION, SOLUTION INTRAVENOUS; SUBCUTANEOUS at 05:12

## 2018-12-27 RX ADMIN — TRAMADOL HYDROCHLORIDE 50 MG: 50 TABLET, FILM COATED ORAL at 12:12

## 2018-12-27 RX ADMIN — LISINOPRIL 20 MG: 20 TABLET ORAL at 08:12

## 2018-12-27 RX ADMIN — INSULIN ASPART 2 UNITS: 100 INJECTION, SOLUTION INTRAVENOUS; SUBCUTANEOUS at 12:12

## 2018-12-27 RX ADMIN — ERTAPENEM SODIUM 1 G: 1 INJECTION, POWDER, LYOPHILIZED, FOR SOLUTION INTRAMUSCULAR; INTRAVENOUS at 12:12

## 2018-12-27 RX ADMIN — INSULIN ASPART 4 UNITS: 100 INJECTION, SOLUTION INTRAVENOUS; SUBCUTANEOUS at 05:12

## 2018-12-27 RX ADMIN — Medication 10 ML: at 05:12

## 2018-12-27 NOTE — SUBJECTIVE & OBJECTIVE
Interval History: feels well. BG better but still not at goal.     Review of Systems   Constitutional: Negative.    Respiratory: Negative.    Cardiovascular: Negative.    Gastrointestinal: Negative.      Objective:     Vital Signs (Most Recent):  Temp: 98.7 °F (37.1 °C) (12/27/18 1733)  Pulse: 82 (12/27/18 1733)  Resp: 20 (12/27/18 1733)  BP: (!) 200/100 (12/27/18 1733)  SpO2: 98 % (12/27/18 1733) Vital Signs (24h Range):  Temp:  [98.7 °F (37.1 °C)-99.3 °F (37.4 °C)] 98.7 °F (37.1 °C)  Pulse:  [76-88] 82  Resp:  [18-20] 20  SpO2:  [96 %-98 %] 98 %  BP: (164-200)/() 200/100     Weight: 120.8 kg (266 lb 6.4 oz)  Body mass index is 37.16 kg/m².    Intake/Output Summary (Last 24 hours) at 12/27/2018 1755  Last data filed at 12/27/2018 1740  Gross per 24 hour   Intake 480 ml   Output 375 ml   Net 105 ml      Physical Exam   Constitutional: He is oriented to person, place, and time. He appears well-developed. No distress.   Cardiovascular: Normal rate, regular rhythm, normal heart sounds and intact distal pulses.   Pulmonary/Chest: Effort normal and breath sounds normal.   Abdominal: Soft. Bowel sounds are normal.   Neurological: He is alert and oriented to person, place, and time.   Skin: He is not diaphoretic.   Right foot wrapped   Nursing note and vitals reviewed.      Significant Labs: All pertinent labs within the past 24 hours have been reviewed.    Significant Imaging: I have reviewed all pertinent imaging results/findings within the past 24 hours.  I have reviewed and interpreted all pertinent imaging results/findings within the past 24 hours.

## 2018-12-27 NOTE — PT/OT/SLP EVAL
Physical Therapy Evaluation    Patient Name:  Khanh Galvan   MRN:  [id number]    Recommendations:     Discharge Recommendations:  (home health PT with assistance from family as needed)   Discharge Equipment Recommendations: none   Barriers to discharge: heel touch weight bearing restriction to right foot in CAM boot    Assessment:     Khanh Galvan is a 58 y.o. male admitted with a medical diagnosis of Subacute osteomyelitis of right foot.  He presents with the following impairments/functional limitations:  weakness, impaired endurance, impaired functional mobilty, gait instability, impaired balance, decreased lower extremity function, decreased safety awareness, impaired skin(heel touch weight bearing to right foot in CAM boot).    Rehab Prognosis: Fair; patient would benefit from acute skilled PT services to address these deficits and reach maximum level of function.    Recent Surgery: Procedure(s) (LRB):  INCISION AND DRAINAGE (Right)  AMPUTATION, TOE PARTIAL RIGHT GREAT TOE (Right) 3 Days Post-Op    Plan:     During this hospitalization, patient to be seen 6 x/week to address the identified rehab impairments via gait training, therapeutic activities, therapeutic exercises and progress toward the following goals:    · Plan of Care Expires:  01/03/19    Subjective     Chief Complaint: Has not been out of bed.   Patient/Family Comments/goals: To get stronger.   Pain/Comfort:  · Pain Rating 1: 0/10    Living Environment:  Patient lives at home with his son and daughter in law. Prior to admission, patients level of function was ambulatory with rolling walker. Equipment used at home: walker, rolling, shower chair. Upon discharge, patient will have assistance from son and daughter in law.    Objective:     Communicated with nurse Anderson prior to session.  Patient found supine in bed with bed alarm, PICC line, SCD upon PT entry to room.    General Precautions: Standard, fall, contact   Orthopedic Precautions:(heel  touch weight bearing to right foot in CAM boot)   Braces: (CAM boot to right foot)     Exams:  · Cognitive Exam:  Patient is oriented to Person, Place, Time and Situation  · Sensation:    · -       Intact per patient report  · Skin Integrity/Edema:   Surgical dressing intact to right foot  · RLE ROM: WFL except unable to assess ankle due to CAM boot  · RLE Strength: WFL except unable to assess ankle due to CAM boot  · LLE ROM: WFL  · LLE Strength: 4+/5    Functional Mobility:  · Bed Mobility:     · Supine to Sit: moderate assistance  · Transfers:     · Sit to Stand:  moderate assistance with rolling walker  · Gait:  Patient ambulated 44ft with Rolling Walker and CGA following heel touch weight bearing to right foot in CAM boot using 3-point gait. Patient demonstrated decreased ajay, decreased velocity of limb motion and decreased toe-to-floor clearance during gait due to impaired balance and decreased strength.    AM-PAC 6 CLICK MOBILITY  Total Score:18     Patient left reclined in bedside chair with all lines intact, call button in reach and nurse Monica notified.    GOALS:   Multidisciplinary Problems     Physical Therapy Goals        Problem: Physical Therapy Goal    Goal Priority Disciplines Outcome Goal Variances Interventions   Physical Therapy Goal     PT, PT/OT      Description:  Goals to be met by: 1/3/19    Patient will increase functional independence with mobility by performin. Supine to sit with supervision  2. Sit to stand transfer with supervision  3. Gait x100 feet with supervision using Rolling Walker  4. Lower extremity exercise program x30 reps per handout, with supervision                      History:     Past Medical History:   Diagnosis Date    Diabetes mellitus     Hypertension     Subacute osteomyelitis        Past Surgical History:   Procedure Laterality Date    AMPUTATION, TOE PARTIAL RIGHT GREAT TOE Right 2018    [facility]    INCISION  AND DRAINAGE Right 12/24/2018    Performed by Cary Villanueva DPM at [facility] OR    left foot         Clinical Decision Making:     History  Co-morbidities and personal factors that may impact the plan of care Examination  Body Structures and Functions, activity limitations and participation restrictions that may impact the plan of care Clinical Presentation   Decision Making/ Complexity Score   Co-morbidities:   [] Time since onset of injury / illness / exacerbation  [x] Status of current condition  []Patient's cognitive status and safety concerns    [x] Multiple Medical Problems (see med hx)  Personal Factors:   [] Patient's age  [] Prior Level of function   [] Patient's home situation (environment and family support)  [] Patient's level of motivation  [] Expected progression of patient      HISTORY:(criteria)    [] 65818 - no personal factors/history    [x] 20450 - has 1-2 personal factor/comorbidity     [] 58628 - has >3 personal factor/comorbidity     Body Regions:  [] Objective examination findings  [] Head     []  Neck  [] Trunk   [] Upper Extremity  [] Lower Extremity    Body Systems:  [] For communication ability, affect, cognition, language, and learning style: the assessment of the ability to make needs known, consciousness, orientation (person, place, and time), expected emotional /behavioral responses, and learning preferences (eg, learning barriers, education  needs)  [] For the neuromuscular system: a general assessment of gross coordinated movement (eg, balance, gait, locomotion, transfers, and transitions) and motor function  (motor control and motor learning)  [x] For the musculoskeletal system: the assessment of gross symmetry, gross range of motion, gross strength, height, and weight  [x] For the integumentary system: the assessment of pliability(texture), presence of scar formation, skin color, and skin integrity  [] For cardiovascular/pulmonary system: the assessment of heart rate, respiratory  rate, blood pressure, and edema     Activity limitations:    [] Patient's cognitive status and saf ety concerns          [] Status of current condition      [x] Weight bearing restriction  [] Cardiopulmunary Restriction    Participation Restrictions:   [] Goals and goal agreement with the patient     [] Rehab potential (prognosis) and probable outcome      Examination of Body System: (criteria)    [] 53694 - addressing 1-2 elements    [x] 17286 - addressing a total of 3 or more elements     [] 91569 -  Addressing a total of 4 or more elements         Clinical Presentation: (criteria)  Evolving - 05141     On examination of body system using standardized tests and measures patient presents with 3 or more elements from any of the following: body structures and functions, activity limitations, and/or participation restrictions.  Leading to a clinical presentation that is considered evolving with changing characteristics                              Clinical Decision Making  (Eval Complexity):  Moderate - 73622     Time Tracking:     PT Received On: 12/27/18  PT Start Time: 1024     PT Stop Time: 1040  PT Total Time (min): 16 min     Billable Minutes: Evaluation  16 with OT present    Shayna Jeffers, PT  12/27/2018

## 2018-12-27 NOTE — PROGRESS NOTES
TN contacted [facility] to inquire of patient's in network SNF providers. Spoke with Kayla. Kayla informed [place] that [facility] is the only in-network provider.     TN sent patient's clinicals (Packet, MD Notes, Mar, and PT/OT notes) to [facility]. Awaiting feedback.

## 2018-12-27 NOTE — PROGRESS NOTES
[facility] Hospital Medicine  Progress Note    Patient Name: Khanh Galvan  MRN: [id number]  Patient Class: IP- Inpatient   Admission Date: 12/23/2018  Length of Stay: 4 days  Attending Physician: Viry Sher MD  Primary Care Provider: JONY Pena        Subjective:     Principal Problem:Subacute osteomyelitis of right foot    HPI:    Khanh Galvan is a 58 y.o. male that (in part)  has a past medical history of Diabetes mellitus, Hypertension, and Subacute osteomyelitis.  has a past surgical history that includes left foot. Presents to [facility] Emergency Department by EMS who was called for fatigue.  When EMS arrived the patient was lethargic and nearly attended.  He was found to be hyperglycemic and hypotensive.  IV fluids were initiated and mental status improved along with his blood pressure.  He was found to have a right-sided diabetic ft lesion.  He reports sustaining injury from stubbing his right great toe on the stairs.  He developed a painful, red, swollen, and warm lesion to his right great toe that has worsened over the last 3 days.  Unfortunately the patient is a poor historian and history is limited.      In the emergency department routine laboratory studies were obtained and x-ray the right foot was performed.  He had elevated ESR, CRP, and physical exam was consistent with diabetic foot lesion with osteomyelitis.  He was given IV fluids and started on empiric antibiotic therapy due to associated cellulitis and concern for possible bacteremia with systemic infection causing infectious encephalopathy.    Hospital medicine has been asked to admit for further evaluation and treatment.     Hospital Course:  No notes on file    Interval History: feels well. BG better but still not at goal.     Review of Systems   Constitutional: Negative.    Respiratory: Negative.    Cardiovascular: Negative.    Gastrointestinal: Negative.      Objective:      Vital Signs (Most Recent):  Temp: 98.7 °F (37.1 °C) (12/27/18 1733)  Pulse: 82 (12/27/18 1733)  Resp: 20 (12/27/18 1733)  BP: (!) 200/100 (12/27/18 1733)  SpO2: 98 % (12/27/18 1733) Vital Signs (24h Range):  Temp:  [98.7 °F (37.1 °C)-99.3 °F (37.4 °C)] 98.7 °F (37.1 °C)  Pulse:  [76-88] 82  Resp:  [18-20] 20  SpO2:  [96 %-98 %] 98 %  BP: (164-200)/() 200/100     Weight: 120.8 kg (266 lb 6.4 oz)  Body mass index is 37.16 kg/m².    Intake/Output Summary (Last 24 hours) at 12/27/2018 1755  Last data filed at 12/27/2018 1740  Gross per 24 hour   Intake 480 ml   Output 375 ml   Net 105 ml      Physical Exam   Constitutional: He is oriented to person, place, and time. He appears well-developed. No distress.   Cardiovascular: Normal rate, regular rhythm, normal heart sounds and intact distal pulses.   Pulmonary/Chest: Effort normal and breath sounds normal.   Abdominal: Soft. Bowel sounds are normal.   Neurological: He is alert and oriented to person, place, and time.   Skin: He is not diaphoretic.   Right foot wrapped   Nursing note and vitals reviewed.      Significant Labs: All pertinent labs within the past 24 hours have been reviewed.    Significant Imaging: I have reviewed all pertinent imaging results/findings within the past 24 hours.  I have reviewed and interpreted all pertinent imaging results/findings within the past 24 hours.    Assessment/Plan:      * Subacute osteomyelitis of right foot    POD 3 partial amputation right toe  Is clinically stable  BG close to goal. Adjusting insulin as needed for goal BG <180  BCx NGTD. Bone Cx with moderate Klebsiella ESBL, many Strep group B and a GNR pending ID/sens  Abx changed to ertapenem by ID. PICC line placed for 6 weeks of IV abx           Acute osteomyelitis of toe, right    S/p I&D with partial amputation of right toe     Diabetic ulcer of right great toe           Sepsis           Morbid obesity due to excess calories    Spent > 5 minutes on weight loss  education       Elevated lactic acid level    2/2 sepsis  Peripheral pulses normal with adequate resuscitation  Good appetite and PO intake       Type 2 diabetes mellitus with right diabetic foot infection    Poorly controlled  Adjusting insulin regimen for goal BG < 180       Cellulitis of right foot    With evidence of osteo  Management as above         VTE Risk Mitigation (From admission, onward)        Ordered     enoxaparin injection 40 mg  Daily      12/25/18 1528     IP VTE HIGH RISK PATIENT  Once      12/24/18 1712     Place HUGH hose  Until discontinued      12/23/18 2159     Place sequential compression device  Until discontinued      12/23/18 2159        Dispo: pending placement      Viry Smith MD  Department of Hospital Medicine   [facility]

## 2018-12-27 NOTE — PROGRESS NOTES
Progress Note    Admit Date: 12/23/2018   LOS: 3 days     SUBJECTIVE:     Follow-up For:  S/p two days right partial hallux amputation . Reports pain well controlled with pain meds. Offloading in CAM boot today.     Scheduled Meds:   enoxaparin  40 mg Subcutaneous Daily    ertapenem (INVANZ) IVPB  1 g Intravenous Q24H    insulin aspart U-100  6 Units Subcutaneous TIDWM    insulin detemir U-100  20 Units Subcutaneous Daily    lisinopril  20 mg Oral Daily    sodium chloride 0.9%  10 mL Intravenous Q6H    sodium chloride 0.9%  3 mL Intravenous Q8H     Continuous Infusions:  PRN Meds:acetaminophen, dextrose 50%, glucagon (human recombinant), hydrALAZINE, influenza, insulin aspart U-100, ondansetron, pneumoc 13-dutch conj-dip cr(PF), Flushing PICC Protocol **AND** sodium chloride 0.9% **AND** sodium chloride 0.9%, traMADol    Review of patient's allergies indicates:  No Known Allergies    Review of Systems  Constitutional: Positive for fatigue. Negative for activity change, appetite change, chills and fever.   Respiratory: Negative for cough and shortness of breath.    Cardiovascular: Negative for chest pain and leg swelling.   Gastrointestinal: Negative for diarrhea, nausea and vomiting.   Musculoskeletal: Positive for gait problem and myalgias.   Skin: Positive for color change and wound.   Neurological: Positive for weakness and numbness.        + paresthesia          OBJECTIVE:     Vital Signs (Most Recent)  Temp: 98.1 °F (36.7 °C) (12/26/18 1618)  Pulse: 87 (12/26/18 1618)  Resp: 18 (12/26/18 1618)  BP: (!) 153/82 (12/26/18 1618)  SpO2: 97 % (12/26/18 1618)    Vital Signs Range (Last 24H):  Temp:  [97.5 °F (36.4 °C)-98.5 °F (36.9 °C)]   Pulse:  [72-87]   Resp:  [14-20]   BP: (135-187)/()   SpO2:  [91 %-98 %]     I & O (Last 24H):    Intake/Output Summary (Last 24 hours) at 12/26/2018 4113  Last data filed at 12/26/2018 1752  Gross per 24 hour   Intake 480 ml   Output 200 ml   Net 280 ml     Physical  Exam:  General: Pt. is well-developed, well-nourished, appears stated age, in no acute distress, alert and oriented x 3. No evidence of depression, anxiety, or agitation. Calm, cooperative, and communicative. Appropriate interactions and affect.     Lower Extremity Examination  Vascular: dorsalis pedis and posterior tibial pulses are palpable bilaterally. Capillary refill time is within normal limits. Edema to the right lower extremity     Neurologic: [place] 5.07 monofilamant testing is diminished [place] feet. Sharp/dull sensation diminished Bilaterally. Light touch diminished Bilaterally.     Musculoskeletal: There is equinus deformity bilateral with decreased dorsiflexion at the ankle joint bilateral. No tenderness with compression of heel. Negative tinels sign. Decreased first MPJ range of motion both weightbearing and nonweightbearing, no crepitus observed the first MP joint, + dorsal flag sign. Mild  bunion deformity is observed. Patient has hammertoes of digits 1-5 bilateral partially reducible      Lymphatics: no lymphangitic streaking bilaterally.     Dermatologic: Skin is warm, digital hair is absent. Abrasion noted to distal left 3rd digit     Sutures intact skin edges well coapted mild erythema noted to surgical site.               Stable callus left 4th toe, no drainage noted.             Laboratory:  CBC:   Recent Labs   Lab 12/25/18  0400   WBC 8.29   RBC 4.13*   HGB 11.8*   HCT 35.8*      MCV 87   MCH 28.6   MCHC 33.0     BMP:   Recent Labs   Lab 12/26/18  0605   *      K 3.9      CO2 23   BUN 10   CREATININE 0.8   CALCIUM 9.4       Diagnostic Results:      ASSESSMENT/PLAN:       S/p 2 days right partial hallux amputation per Dr. Villanueva. Sutures intact no purulent drainage noted.     Painted with betadine covered with adaptic , 4x4 gauze wrapped with kerlix and ACE>     Final abx per ID.     Bandage can stay intact for 5 days  until clinic visit.     If still inpatient  will change bandage on 12/31/18.     Partial heel WB right foot with CAM boot.     Ok for discharge from podiatry standpoint.     Podiatry will follow.     Jeanne La DPM

## 2018-12-27 NOTE — PT/OT/SLP EVAL
Occupational Therapy   Evaluation    Name: Khanh Galvan  MRN: [id number]  Admitting Diagnosis:  Subacute osteomyelitis of right foot 3 Days Post-Op    Recommendations:     Discharge Recommendations: other (see comments)(Home with family and HH OT)  Discharge Equipment Recommendations:  none  Barriers to discharge:  None    History:     Occupational Profile:  Living Environment: The patient lives with his son and dtr-in-law in a house with 6 AIDA  Previous level of function: Per patient, he amb using a RW and was able to  the shower and dress himself.  Roles and Routines: No family was no present to provide information.  Equipment Used at Home:  walker, rolling  Assistance upon Discharge:  son and dtr-in-law    Past Medical History:   Diagnosis Date    Diabetes mellitus     Hypertension     Subacute osteomyelitis        Past Surgical History:   Procedure Laterality Date    AMPUTATION, TOE PARTIAL RIGHT GREAT TOE Right 12/24/2018    [facility]    INCISION AND DRAINAGE Right 12/24/2018    Performed by [facility] at [facility] OR    left foot         Subjective     Chief Complaint: none, agreeable to OT  Patient/Family Comments/goals: wants to get stronger  Pain/Comfort:  · Pain Rating 1: 0/10    Patients cultural, spiritual, Adventism conflicts given the current situation: no    Objective:     Communicated with: Monica moran prior to session.  Patient found with: in bed and bed alarm, PICC line upon OT entry to room.    General Precautions: Standard, fall, contact   Orthopedic Precautions:(right foot heel weight bearing wusing a crutch or walker)   Braces: (Cam boot to right foot)     Occupational Performance:    Bed Mobility:    · Patient completed Scooting/Bridging with stand by assistance  · Patient completed Supine to Sit with stand by assistance with HOB elevated    Functional Mobility/Transfers:  · Patient completed Sit <> Stand Transfer with moderate assistance   with  rolling walker   · Functional Mobility: The patient amb using a RW with CGA with VC for heel touch weight bearing to the RLE with CAM boot    Activities of Daily Living:  · Upper Body Dressing: minimum assistance    · Lower Body Dressing: stand by assistance to don the right sock and min assist to don the left sock    Cognitive/Visual Perceptual:  Cognitive/Psychosocial Skills:     -       Oriented to: Person   -       Follows Commands/attention:Follows two-step commands  -       Communication: clear/fluent  -       Memory: Impaired STM  -       Safety awareness/insight to disability: impaired   -       Mood/Affect/Coping skills/emotional control: Appropriate to situation    Physical Exam:  Balance: -       fair+  Postural examination/scapula alignment:    -       Rounded shoulders  -       Forward head  Skin integrity: right foot incision covered by dressing and boot  Sensation:    -       Impaired  B hand numbness 2* diabetic neuropathy per patient  Upper Extremity Range of Motion:     -       Right Upper Extremity: WFL  -       Left Upper Extremity: WFL  Upper Extremity Strength:    -       Right Upper Extremity: WFL  -       Left Upper Extremity: WFL    AMPAC 6 Click ADL:  AMPAC Total Score: 20    Treatment & Education:  The patient participated in OT eval.   Education:    Patient left up in chair, reclined with all lines intact, call button in reach and nurseMonica notified    Assessment:     Khanh Galvan is a 58 y.o. male with a medical diagnosis of Subacute osteomyelitis of right foot.  He presents with the following performance deficits affecting function: impaired endurance, impaired self care skills, impaired balance, gait instability, impaired functional mobilty, impaired cognition, decreased lower extremity function, impaired skin, pain, decreased safety awareness.     Rehab Prognosis: Good; patient would benefit from acute skilled OT services to address these deficits and reach maximum level of  "function.         Clinical Decision Makin.  OT Low:  "Pt evaluation falls under low complexity for evaluation coding due to performance deficits noted in 1-3 areas as stated above and 0 co-morbities affecting current functional status. Data obtained from problem focused assessments. No modifications or assistance was required for completion of evaluation. Only brief occupational profile and history review completed."     Plan:     Patient to be seen   to address the above listed problems via self-care/home management, therapeutic activities, therapeutic exercises  · Plan of Care Expires: 01/10/19  · Plan of Care Reviewed with: patient    This Plan of care has been discussed with the patient who was involved in its development and understands and is in agreement with the identified goals and treatment plan    GOALS:   Multidisciplinary Problems     Occupational Therapy Goals        Problem: Occupational Therapy Goal    Goal Priority Disciplines Outcome Interventions   Occupational Therapy Goal     OT, PT/OT Ongoing (interventions implemented as appropriate)    Description:  Goals to be met by: 1/10/18    Patient will increase functional independence with ADLs by performing:    UE Dressing with Stand-by Assistance.  LE Dressing with Stand-by Assistance.  Grooming while standing at sink with Stand-by Assistance.  Supine to sit with Stand-by Assistance.  Step transfer with Stand-by Assistance  Toilet transfer to toilet with Stand-by Assistance.  Upper extremity exercise program x15 reps per handout, with assistance as needed.                      Time Tracking:     OT Date of Treatment: 18  OT Start Time: 1024  OT Stop Time: 1039  OT Total Time (min): 15 min    Billable Minutes:Evaluation 15 (with PT)    Claudia Augustine OT  2018    "

## 2018-12-27 NOTE — PLAN OF CARE
Problem: Physical Therapy Goal  Goal: Physical Therapy Goal  Goals to be met by: 1/3/19    Patient will increase functional independence with mobility by performin. Supine to sit with supervision  2. Sit to stand transfer with supervision  3. Gait x100 feet with supervision using Rolling Walker  4. Lower extremity exercise program x30 reps per handout, with supervision      Patient ambulated 44ft with RW, CAM boot, heel touch weight bearing, and CGA. He would benefit from HH PT at discharge.

## 2018-12-27 NOTE — PROGRESS NOTES
"[facility]  Podiatry  Progress Note    Patient Name: Khanh Galvan  MRN: [id number]  Admission Date: 12/23/2018  Hospital Length of Stay: 4 days  Attending Physician: Viry Sher MD  Primary Care Provider: JONY Pena     Subjective:     Interval History: Seen at bedside resting comfortably and eating lunch.  No new pedal complaints.  Patient relates he did some ambulating with PT this morning, "it felt good."     Follow-up For: Procedure(s) (LRB):  INCISION AND DRAINAGE (Right)  AMPUTATION, TOE PARTIAL RIGHT GREAT TOE (Right)    Post-Operative Day: 3 Days Post-Op    Scheduled Meds:   enoxaparin  40 mg Subcutaneous Daily    ertapenem (INVANZ) IVPB  1 g Intravenous Q24H    insulin aspart U-100  6 Units Subcutaneous TIDWM    insulin detemir U-100  25 Units Subcutaneous Daily    lisinopril  20 mg Oral Daily    sodium chloride 0.9%  10 mL Intravenous Q6H    sodium chloride 0.9%  3 mL Intravenous Q8H     Continuous Infusions:  PRN Meds:acetaminophen, dextrose 50%, glucagon (human recombinant), hydrALAZINE, influenza, insulin aspart U-100, ondansetron, pneumoc 13-dutch conj-dip cr(PF), Flushing PICC Protocol **AND** sodium chloride 0.9% **AND** sodium chloride 0.9%, traMADol    Review of Systems   Constitutional: Positive for fatigue. Negative for activity change, appetite change, chills and fever.   Respiratory: Negative for cough and shortness of breath.    Cardiovascular: Negative for chest pain and leg swelling.   Gastrointestinal: Negative for diarrhea, nausea and vomiting.   Musculoskeletal: Positive for gait problem and myalgias.   Skin: Positive for wound.   Neurological: Positive for weakness and numbness.        + paresthesia      Objective:     Vital Signs (Most Recent):  Temp: 98.8 °F (37.1 °C) (12/27/18 1134)  Pulse: 76 (12/27/18 1134)  Resp: 18 (12/27/18 1134)  BP: (!) 168/93 (12/27/18 1134)  SpO2: 96 % (12/27/18 1134) Vital Signs (24h Range):  Temp:  [98.1 °F (36.7 " °C)-99.3 °F (37.4 °C)] 98.8 °F (37.1 °C)  Pulse:  [76-88] 76  Resp:  [18-19] 18  SpO2:  [96 %-98 %] 96 %  BP: (153-182)/(82-99) 168/93     Weight: 120.8 kg (266 lb 6.4 oz)  Body mass index is 37.16 kg/m².    General: Pt. is well-developed, well-nourished, appears stated age, in no acute distress, alert and oriented x 3. No evidence of depression, anxiety, or agitation. Calm, cooperative, and communicative. Appropriate interactions and affect.    Surgical Site  Incision: Incision surgical foot well approximated, good skin apposition, sutures intact without gaps, drainage, pus, tracking, fluctuance, malodor, or signs of infection.   Signs of infection: local edema and erythema  Drainage: bloody  Periwound: Reddened with lateral blistering to toe    12/27 12/26/18                    Laboratory:  CBC:   Recent Labs   Lab 12/25/18  0400   WBC 8.29   RBC 4.13*   HGB 11.8*   HCT 35.8*      MCV 87   MCH 28.6   MCHC 33.0     CMP:   Recent Labs   Lab 12/25/18  0359  12/27/18  0545   *   < > 246*   CALCIUM 8.7   < > 9.2   ALBUMIN 2.6*  --   --    PROT 6.4  --   --       < > 137   K 3.7   < > 3.9   CO2 23   < > 25      < > 105   BUN 16   < > 14   CREATININE 0.9   < > 0.9   ALKPHOS 66  --   --    ALT 14  --   --    AST 18  --   --    BILITOT 0.4  --   --     < > = values in this interval not displayed.     CRP:   Recent Labs   Lab 12/23/18  1904   CRP 83.0*     ESR:   Recent Labs   Lab 12/23/18  1904   SEDRATE 58*     Microbiology Results (last 7 days)     Procedure Component Value Units Date/Time    Aerobic culture [[id number]]  (Susceptibility) Collected:  12/24/18 8113    Order Status:  Completed Specimen:  Bone from Toe, Right Foot Updated:  12/27/18 0845     Aerobic Bacterial Culture Results called to and read back by:Pili Laird-4 easts     Aerobic Bacterial Culture --     KLEBSIELLA PNEUMONIAE ESBL  Moderate       Aerobic Bacterial Culture --     STREPTOCOCCUS AGALACTIAE (GROUP  B)  Many  Beta-hemolytic streptococci are routinely susceptible to   penicillins,cephalosporins and carbapenems.       Aerobic Bacterial Culture --     GRAM NEGATIVE LILIANA, NON-LACTOSE   Few  Identification and susceptibility pending      Culture, Anaerobe [[id number]] Collected:  12/24/18 1458    Order Status:  Completed Specimen:  Bone from Toe, Right Foot Updated:  12/27/18 0824     Anaerobic Culture No anaerobes isolated    AFB Culture & Smear [[id number]] Collected:  12/24/18 1458    Order Status:  Completed Specimen:  Bone from Toe, Right Foot Updated:  12/26/18 2127     AFB Culture & Smear Culture in progress     AFB CULTURE STAIN No acid fast bacilli seen.    Blood culture x two cultures. Draw prior to antibiotics. [[id number]] Collected:  12/23/18 1850    Order Status:  Completed Specimen:  Blood from Peripheral, Hand, Left Updated:  12/26/18 2103     Blood Culture, Routine No Growth to date     Blood Culture, Routine No Growth to date     Blood Culture, Routine No Growth to date     Blood Culture, Routine No Growth to date    Narrative:       Aerobic and anaerobic    Blood culture x two cultures. Draw prior to antibiotics. [[id number]] Collected:  12/23/18 1912    Order Status:  Completed Specimen:  Blood from Peripheral, Hand, Right Updated:  12/26/18 2103     Blood Culture, Routine No Growth to date     Blood Culture, Routine No Growth to date     Blood Culture, Routine No Growth to date     Blood Culture, Routine No Growth to date    Narrative:       Aerobic and anaerobic    Gram stain [[id number]] Collected:  12/24/18 1458    Order Status:  Completed Specimen:  Bone from Toe, Right Foot Updated:  12/25/18 0823     Gram Stain Result Moderate Gram positive cocci in pairs      Few WBC's        Specimen (12h ago, onward)    None          Diagnostic Results:  Imaging Results          X-Ray Foot Complete Right (Final result)  Result time 12/23/18 19:53:33    Final result by Rain Delgado MD (12/23/18  19:53:33)                 Impression:      See above.      Electronically signed by: Rain Delgado MD  Date:    12/23/2018  Time:    19:53             Narrative:    EXAMINATION:  XR FOOT COMPLETE 3 VIEW RIGHT    CLINICAL HISTORY:  . Osteomyelitis, unspecified    TECHNIQUE:  AP, lateral, and oblique views of the right foot were performed.    COMPARISON:  None.    FINDINGS:  No evidence of acute fracture or dislocation.  Small ulceration with suspected small amount of soft tissue air seen at the distal aspect of the great toe. No definite osseous erosive or destructive changes to suggest osteomyelitis.  Future MRI follow-up would be more sensitive for detection if clinical concern exists.                               X-Ray Chest AP Portable (Final result)  Result time 12/23/18 19:50:32    Final result by Rain Delgado MD (12/23/18 19:50:32)                 Impression:      Cardiomegaly with suspected mild pulmonary edema.      Electronically signed by: Rain Delgado MD  Date:    12/23/2018  Time:    19:50             Narrative:    EXAMINATION:  XR CHEST AP PORTABLE    CLINICAL HISTORY:  Sepsis;    TECHNIQUE:  Single frontal view of the chest was performed.    COMPARISON:  03/02/2018.    FINDINGS:  Heart is enlarged but stable in size.  There is prominence of central pulmonary vasculature with mild perihilar opacity and diffuse increased interstitial attenuation most suggestive for mild pulmonary edema.  No evidence of focal consolidation, pneumothorax, or large effusion.  No acute osseous abnormality identified.                                  Assessment/Plan:     Active Diagnoses:    Diagnosis Date Noted POA    PRINCIPAL PROBLEM:  Subacute osteomyelitis of right foot [M86.271] 02/26/2018 Yes    Morbid obesity due to excess calories [E66.01] 12/24/2018 Yes    Sepsis [A41.9] 12/24/2018 Yes    Diabetic ulcer of right great toe [E11.621, L97.519]  Yes    Acute osteomyelitis of toe, right [M86.171]  Yes     Elevated lactic acid level [R79.89] 12/23/2018 Yes    Cellulitis of right foot [L03.115] 02/24/2018 Yes    Type 2 diabetes mellitus with right diabetic foot infection [E11.628, L08.9] 02/24/2018 Yes      Problems Resolved During this Admission:       S/p 3  days right partial hallux amputation. Sutures intact no purulent drainage noted. He has a blister secondary to ambulating with PT, stable.  Mores cushion applied to surgical site on dressing change today    Painted with betadine covered with adaptic, 4x4 gauze wrapped with kerlix, cast padding, and coban    Final abx per ID.  Intraop, margins appeared healthy however proximal margin pathology still pending.      Bandage can stay intact for 5-7 days until clinic visit.     If still inpatient will change bandage on 12/31/18.     Partial heel WB right foot with CAM boot.     Ok for discharge from podiatry standpoint.     Podiatry will follow.     Cary Villanueva DPM  Podiatry  [facility]

## 2018-12-27 NOTE — ASSESSMENT & PLAN NOTE
POD 3 partial amputation right toe  Is clinically stable  BG close to goal. Adjusting insulin as needed for goal BG <180  BCx NGTD. Bone Cx with moderate Klebsiella ESBL, many Strep group B and a GNR pending ID/sens  Abx changed to ertapenem by ID. PICC line placed for 6 weeks of IV abx

## 2018-12-27 NOTE — PLAN OF CARE
Problem: Occupational Therapy Goal  Goal: Occupational Therapy Goal  Goals to be met by: 1/10/18    Patient will increase functional independence with ADLs by performing:    UE Dressing with Stand-by Assistance.  LE Dressing with Stand-by Assistance.  Grooming while standing at sink with Stand-by Assistance.  Supine to sit with Stand-by Assistance.  Step transfer with Stand-by Assistance  Toilet transfer to toilet with Stand-by Assistance.  Upper extremity exercise program x15 reps per handout, with assistance as needed.    Outcome: Ongoing (interventions implemented as appropriate)  Patient will benefit from OT to address functional deficits.    Comments: The patient is able to amb using a RW with nursing assist.

## 2018-12-27 NOTE — PLAN OF CARE
Problem: Adult Inpatient Plan of Care  Goal: Plan of Care Review  Outcome: Ongoing (interventions implemented as appropriate)   12/27/18 1521   Plan of Care Review   Plan of Care Reviewed With patient       Comments: Plan of care reviewed with patient, he is alert and oriented, able to make needs known, remains free of injury during shift. Iv antibiotics infusing as ordered, picc line flushed per protocol. Patient wound care done by md. accucheck done and insulin given as ordered. scd and mlies in place. Tele monitor in place, 8694. Isolation precautions maintained. Vssaf. Incontinence care given as needed. Bed in low locked position, call light in reach.

## 2018-12-28 LAB
BACTERIA BLD CULT: NORMAL
BACTERIA BLD CULT: NORMAL
POCT GLUCOSE: 165 MG/DL (ref 70–110)
POCT GLUCOSE: 213 MG/DL (ref 70–110)
POCT GLUCOSE: 227 MG/DL (ref 70–110)
POCT GLUCOSE: 239 MG/DL (ref 70–110)

## 2018-12-28 PROCEDURE — 63600175 PHARM REV CODE 636 W HCPCS: Performed by: INTERNAL MEDICINE

## 2018-12-28 PROCEDURE — A4216 STERILE WATER/SALINE, 10 ML: HCPCS | Performed by: INTERNAL MEDICINE

## 2018-12-28 PROCEDURE — 63600175 PHARM REV CODE 636 W HCPCS: Performed by: HOSPITALIST

## 2018-12-28 PROCEDURE — 99233 SBSQ HOSP IP/OBS HIGH 50: CPT | Mod: ,,, | Performed by: INTERNAL MEDICINE

## 2018-12-28 PROCEDURE — A4216 STERILE WATER/SALINE, 10 ML: HCPCS | Performed by: EMERGENCY MEDICINE

## 2018-12-28 PROCEDURE — 97116 GAIT TRAINING THERAPY: CPT

## 2018-12-28 PROCEDURE — 97110 THERAPEUTIC EXERCISES: CPT

## 2018-12-28 PROCEDURE — 11000001 HC ACUTE MED/SURG PRIVATE ROOM

## 2018-12-28 PROCEDURE — 25000003 PHARM REV CODE 250: Performed by: INTERNAL MEDICINE

## 2018-12-28 PROCEDURE — 25000003 PHARM REV CODE 250: Performed by: EMERGENCY MEDICINE

## 2018-12-28 PROCEDURE — 99233 PR SUBSEQUENT HOSPITAL CARE,LEVL III: ICD-10-PCS | Mod: ,,, | Performed by: INTERNAL MEDICINE

## 2018-12-28 RX ORDER — INSULIN ASPART 100 [IU]/ML
10 INJECTION, SOLUTION INTRAVENOUS; SUBCUTANEOUS
Status: DISCONTINUED | OUTPATIENT
Start: 2018-12-28 | End: 2018-12-31

## 2018-12-28 RX ADMIN — Medication 10 ML: at 02:12

## 2018-12-28 RX ADMIN — INSULIN ASPART 4 UNITS: 100 INJECTION, SOLUTION INTRAVENOUS; SUBCUTANEOUS at 05:12

## 2018-12-28 RX ADMIN — Medication 3 ML: at 01:12

## 2018-12-28 RX ADMIN — INSULIN ASPART 6 UNITS: 100 INJECTION, SOLUTION INTRAVENOUS; SUBCUTANEOUS at 12:12

## 2018-12-28 RX ADMIN — Medication 10 ML: at 05:12

## 2018-12-28 RX ADMIN — INSULIN ASPART 4 UNITS: 100 INJECTION, SOLUTION INTRAVENOUS; SUBCUTANEOUS at 12:12

## 2018-12-28 RX ADMIN — ENOXAPARIN SODIUM 40 MG: 100 INJECTION SUBCUTANEOUS at 05:12

## 2018-12-28 RX ADMIN — HYDRALAZINE HYDROCHLORIDE 10 MG: 20 INJECTION INTRAMUSCULAR; INTRAVENOUS at 02:12

## 2018-12-28 RX ADMIN — INSULIN ASPART 10 UNITS: 100 INJECTION, SOLUTION INTRAVENOUS; SUBCUTANEOUS at 05:12

## 2018-12-28 RX ADMIN — TRAMADOL HYDROCHLORIDE 50 MG: 50 TABLET, FILM COATED ORAL at 01:12

## 2018-12-28 RX ADMIN — INSULIN ASPART 6 UNITS: 100 INJECTION, SOLUTION INTRAVENOUS; SUBCUTANEOUS at 08:12

## 2018-12-28 RX ADMIN — Medication 10 ML: at 11:12

## 2018-12-28 RX ADMIN — ERTAPENEM SODIUM 1 G: 1 INJECTION, POWDER, LYOPHILIZED, FOR SOLUTION INTRAMUSCULAR; INTRAVENOUS at 11:12

## 2018-12-28 RX ADMIN — INSULIN ASPART 4 UNITS: 100 INJECTION, SOLUTION INTRAVENOUS; SUBCUTANEOUS at 08:12

## 2018-12-28 RX ADMIN — Medication 10 ML: at 08:12

## 2018-12-28 RX ADMIN — LISINOPRIL 40 MG: 20 TABLET ORAL at 08:12

## 2018-12-28 NOTE — PLAN OF CARE
Patient under review at [facility] for SNF. Awaiting final cultures.        12/28/18 1223   Discharge Reassessment   Assessment Type Discharge Planning Reassessment   Provided patient/caregiver education on the expected discharge date and the discharge plan Yes   Do you have any problems affording any of your prescribed medications? No   Discharge Plan A Skilled Nursing Facility   Discharge Plan B Home with family;Home;Home Health   Anticipated Discharge Disposition SNF   Can the patient answer the patient profile reliably? Yes, cognitively intact   How does the patient rate their overall health at the present time? Good   Describe the patient's ability to walk at the present time. Minor restrictions or changes   Number of comorbid conditions (as recorded on the chart) Two   Post-Acute Status   Post-Acute Authorization Placement   Post-Acute Placement Status Pending Clinical Review

## 2018-12-28 NOTE — PROGRESS NOTES
[facility]  Infectious Disease  Progress Note    Patient Name: Khanh Galvan  MRN: [id number]  Admission Date: 12/23/2018  Length of Stay: 5 days  Attending Physician: Viry Sher MD  Primary Care Provider: JONY Pena    Isolation Status: Contact     Assessment/Plan:      * Subacute osteomyelitis of right foot    - s/p partial amputation  - proximal bone debrided, path pending  - OR culture growing ESBL-Kleb, Strep, and another, yet unidentified, GNB  - continue ertapenem for now (carbapenems superior to pip/tazo for ESBL-producing GNB)  - awaiting ID of second GNB (hopefully, path will be normal bone and abx can be discontinued)         Mike Zamora MD  Infectious Disease  [facility]    Subjective:     Principal Problem:Subacute osteomyelitis of right foot    HPI: Khanh Galvan is a 58 y.o. male that (in part)  has a past medical history of Diabetes mellitus, Hypertension, and Subacute osteomyelitis.  has a past surgical history that includes left foot. Presents to [facility] Emergency Department by EMS who was called for fatigue. When EMS arrived the patient was lethargic and nearly attended.  He was found to be hyperglycemic and hypotensive.  IV fluids were initiated and mental status improved along with his blood pressure.  He was found to have a right-sided diabetic ft lesion.  He reports sustaining injury from stubbing his right great toe on the stairs.  He developed a painful, red, swollen, and warm lesion to his right great toe that has worsened over the last 3 days.  Unfortunately the patient is a poor historian and history is limited.  In the emergency department routine laboratory studies were obtained and x-ray the right foot was performed.  He had elevated ESR, CRP, and physical exam was consistent with diabetic foot lesion with osteomyelitis.  He was given IV fluids and started on empiric antibiotic therapy due to  associated cellulitis and concern for possible bacteremia with systemic infection causing infectious encephalopathy.     Hospital Medicine admitted the patient and abx were started. Podiatry performed a partial toe amputation and I am consulted for abx recommendations given an ESBL-producing Kleb on bone culture. Path of proximal bone is pending. The patient denies complaints. Was treated at [facility] in April with IV abx requiring PICC by patient report.      Interval History: Feels well. Rough night - could not get comfortable. Denies fever or chills.     Review of Systems   Constitutional: Negative for chills and fever.   All other systems reviewed and are negative.    Objective:     Vital Signs (Most Recent):  Temp: 98 °F (36.7 °C) (12/28/18 0749)  Pulse: 80 (12/28/18 0749)  Resp: 18 (12/28/18 0749)  BP: (!) 141/86 (12/28/18 0749)  SpO2: 97 % (12/28/18 0749) Vital Signs (24h Range):  Temp:  [97.8 °F (36.6 °C)-98.8 °F (37.1 °C)] 98 °F (36.7 °C)  Pulse:  [74-86] 80  Resp:  [18-20] 18  SpO2:  [96 %-98 %] 97 %  BP: (124-200)/() 141/86     Weight: 120.8 kg (266 lb 6.4 oz)  Body mass index is 37.16 kg/m².    Estimated Creatinine Clearance: 116.9 mL/min (based on SCr of 0.9 mg/dL).    Physical Exam   Constitutional: He is oriented to person, place, and time. He appears well-developed and well-nourished.   HENT:   Head: Normocephalic and atraumatic.   Right Ear: External ear normal.   Left Ear: External ear normal.   Eyes: Conjunctivae and EOM are normal. Pupils are equal, round, and reactive to light. No scleral icterus.   Cardiovascular: Normal rate, regular rhythm and normal heart sounds.   Pulmonary/Chest: Effort normal and breath sounds normal. No stridor. No respiratory distress.   Abdominal: Soft. Bowel sounds are normal. There is no tenderness.   Neurological: He is alert and oriented to person, place, and time.   Skin: Skin is warm and dry.   Psychiatric: He has a normal mood and affect. His behavior is  normal. Judgment and thought content normal.   Nursing note and vitals reviewed.      Significant Labs:   Blood Culture:   Recent Labs   Lab 12/23/18  1850 12/23/18  1912   LABBLOO No Growth to date  No Growth to date  No Growth to date  No Growth to date  No Growth to date No Growth to date  No Growth to date  No Growth to date  No Growth to date  No Growth to date     CBC: No results for input(s): WBC, HGB, HCT, PLT in the last 48 hours.  CMP:   Recent Labs   Lab 12/27/18  0545      K 3.9      CO2 25   *   BUN 14   CREATININE 0.9   CALCIUM 9.2   ANIONGAP 7*   EGFRNONAA >60     Wound Culture:   Recent Labs   Lab 12/24/18  1458   LABAERO Results called to and read back by:Pili Laird-Jesus estrada  KLEBSIELLA PNEUMONIAE ESBL  Moderate    STREPTOCOCCUS AGALACTIAE (GROUP B)  Many  Beta-hemolytic streptococci are routinely susceptible to   penicillins,cephalosporins and carbapenems.    GRAM NEGATIVE LILIANA, NON-LACTOSE   Few  Identification and susceptibility pending         Significant Imaging: I have reviewed all pertinent imaging results/findings within the past 24 hours.

## 2018-12-28 NOTE — SUBJECTIVE & OBJECTIVE
Interval History: Feels well. Rough night - could not get comfortable. Denies fever or chills.     Review of Systems   Constitutional: Negative for chills and fever.   All other systems reviewed and are negative.    Objective:     Vital Signs (Most Recent):  Temp: 98 °F (36.7 °C) (12/28/18 0749)  Pulse: 80 (12/28/18 0749)  Resp: 18 (12/28/18 0749)  BP: (!) 141/86 (12/28/18 0749)  SpO2: 97 % (12/28/18 0749) Vital Signs (24h Range):  Temp:  [97.8 °F (36.6 °C)-98.8 °F (37.1 °C)] 98 °F (36.7 °C)  Pulse:  [74-86] 80  Resp:  [18-20] 18  SpO2:  [96 %-98 %] 97 %  BP: (124-200)/() 141/86     Weight: 120.8 kg (266 lb 6.4 oz)  Body mass index is 37.16 kg/m².    Estimated Creatinine Clearance: 116.9 mL/min (based on SCr of 0.9 mg/dL).    Physical Exam   Constitutional: He is oriented to person, place, and time. He appears well-developed and well-nourished.   HENT:   Head: Normocephalic and atraumatic.   Right Ear: External ear normal.   Left Ear: External ear normal.   Eyes: Conjunctivae and EOM are normal. Pupils are equal, round, and reactive to light. No scleral icterus.   Cardiovascular: Normal rate, regular rhythm and normal heart sounds.   Pulmonary/Chest: Effort normal and breath sounds normal. No stridor. No respiratory distress.   Abdominal: Soft. Bowel sounds are normal. There is no tenderness.   Neurological: He is alert and oriented to person, place, and time.   Skin: Skin is warm and dry.   Psychiatric: He has a normal mood and affect. His behavior is normal. Judgment and thought content normal.   Nursing note and vitals reviewed.      Significant Labs:   Blood Culture:   Recent Labs   Lab 12/23/18 1850 12/23/18 1912   LABBLOO No Growth to date  No Growth to date  No Growth to date  No Growth to date  No Growth to date No Growth to date  No Growth to date  No Growth to date  No Growth to date  No Growth to date     CBC: No results for input(s): WBC, HGB, HCT, PLT in the last 48 hours.  CMP:   Recent  Labs   Lab 12/27/18  0545      K 3.9      CO2 25   *   BUN 14   CREATININE 0.9   CALCIUM 9.2   ANIONGAP 7*   EGFRNONAA >60     Wound Culture:   Recent Labs   Lab 12/24/18  1458   LABAERO Results called to and read back by:Pili Laird-Jesus estrada  KLEBSIELLA PNEUMONIAE ESBL  Moderate    STREPTOCOCCUS AGALACTIAE (GROUP B)  Many  Beta-hemolytic streptococci are routinely susceptible to   penicillins,cephalosporins and carbapenems.    GRAM NEGATIVE LILIANA, NON-LACTOSE   Few  Identification and susceptibility pending         Significant Imaging: I have reviewed all pertinent imaging results/findings within the past 24 hours.

## 2018-12-28 NOTE — PHYSICIAN QUERY
PT Name: Khanh Galvan  MR #: [id number]     Physician Query Form - Documentation Clarification      CDS: Ailyn Monsivais RN  Contact information:[email or web address]    This form is a permanent document in the medical record.     Query Date: December 28, 2018    By submitting this query, we are merely seeking further clarification of documentation. Please utilize your independent clinical judgment when addressing the question(s) below.    The Medical record reflects the following:    Supporting Clinical Findings Location in Medical Record   Subacute osteomyelitis of right foot  - s/p partial amputation  - proximal bone debrided, path pending  - OR culture growing ESBL-Kleb, Strep, and another, yet unidentified, GNB  - continue ertapenem for now (carbapenems superior to pip/tazo for ESBL-producing GNB)  - awaiting ID of second GNB (hopefully, path will be normal bone and abx can be discontinued)   ID PN 12/28   Acute osteomyelitis of toe, right  S/p I&D with partial amputation of right toe   HM PN 12/27                                                                            Doctor, Please clarify the acuity of the osteomyelitis.     Provider Use Only      [  ] Acute      [  ] Subacute      [  x] Acute and Subacute      [  ] Other:__________________      [  ] Unable to determine                                                                                                             [  ] Clinically Undetermined

## 2018-12-28 NOTE — CLINICAL REVIEW
Pathology results available and reviewed.    Proximal margins clear:     Segment of tubular bone (submitted as proximal margin right great toe):  -Viable bone exhibiting no inflammatory infiltrates  -Attached viable fibroadipose tissues without significant histopathologic abnormalities

## 2018-12-28 NOTE — ASSESSMENT & PLAN NOTE
- s/p partial amputation  - proximal bone debrided, path pending  - OR culture growing ESBL-Kleb, Strep, and another, yet unidentified, GNB  - continue ertapenem for now (carbapenems superior to pip/tazo for ESBL-producing GNB)  - awaiting ID of second GNB (hopefully, path will be normal bone and abx can be discontinued)

## 2018-12-28 NOTE — PROGRESS NOTES
TN spoke with Vilma at [facility].  Vilma inquired of patient's smoking status, discharge plan after SNF, and his abx and wound care. TN informed Vilma that patient does not smoke, plans to discharge home after SNF and that we are currently awaiting final cultures for abx determination. Vilma stated patient's Invanz abx is very expensive and patient will probably need to change abx if he remains on abx. Vilma will contact patient's insurance to verify benefits.

## 2018-12-28 NOTE — PROGRESS NOTES
[facility] Hospital Medicine  Progress Note    Patient Name: Khanh Galvan  MRN: [id number]  Patient Class: IP- Inpatient   Admission Date: 12/23/2018  Length of Stay: 5 days  Attending Physician: Viry Sher MD  Primary Care Provider: JONY Pena        Subjective:     Principal Problem:Subacute osteomyelitis of right foot    HPI:    Khanh Galvan is a 58 y.o. male that (in part)  has a past medical history of Diabetes mellitus, Hypertension, and Subacute osteomyelitis.  has a past surgical history that includes left foot. Presents to [facility] Emergency Department by EMS who was called for fatigue.  When EMS arrived the patient was lethargic and nearly attended.  He was found to be hyperglycemic and hypotensive.  IV fluids were initiated and mental status improved along with his blood pressure.  He was found to have a right-sided diabetic ft lesion.  He reports sustaining injury from stubbing his right great toe on the stairs.  He developed a painful, red, swollen, and warm lesion to his right great toe that has worsened over the last 3 days.  Unfortunately the patient is a poor historian and history is limited.      In the emergency department routine laboratory studies were obtained and x-ray the right foot was performed.  He had elevated ESR, CRP, and physical exam was consistent with diabetic foot lesion with osteomyelitis.  He was given IV fluids and started on empiric antibiotic therapy due to associated cellulitis and concern for possible bacteremia with systemic infection causing infectious encephalopathy.    Hospital medicine has been asked to admit for further evaluation and treatment.     Hospital Course:  No notes on file    Interval History: feels well. BG better but still not at goal.     Review of Systems   Constitutional: Negative.    Respiratory: Negative.    Cardiovascular: Negative.    Gastrointestinal: Negative.      Objective:      Vital Signs (Most Recent):  Temp: 98.5 °F (36.9 °C) (12/28/18 1202)  Pulse: 83 (12/28/18 1202)  Resp: 18 (12/28/18 1202)  BP: 106/61 (12/28/18 1202)  SpO2: 98 % (12/28/18 1202) Vital Signs (24h Range):  Temp:  [97.8 °F (36.6 °C)-98.8 °F (37.1 °C)] 98.5 °F (36.9 °C)  Pulse:  [74-86] 83  Resp:  [18-20] 18  SpO2:  [97 %-98 %] 98 %  BP: (106-200)/() 106/61     Weight: 120.8 kg (266 lb 6.4 oz)  Body mass index is 37.16 kg/m².    Intake/Output Summary (Last 24 hours) at 12/28/2018 1554  Last data filed at 12/28/2018 1219  Gross per 24 hour   Intake 720 ml   Output 625 ml   Net 95 ml      Physical Exam   Constitutional: He is oriented to person, place, and time. He appears well-developed. No distress.   Cardiovascular: Normal rate, regular rhythm, normal heart sounds and intact distal pulses.   Pulmonary/Chest: Effort normal and breath sounds normal.   Abdominal: Soft. Bowel sounds are normal.   Neurological: He is alert and oriented to person, place, and time.   Skin: He is not diaphoretic.   Right foot wrapped   Nursing note and vitals reviewed.      Significant Labs: All pertinent labs within the past 24 hours have been reviewed.    Significant Imaging: I have reviewed all pertinent imaging results/findings within the past 24 hours.  I have reviewed and interpreted all pertinent imaging results/findings within the past 24 hours.    Assessment/Plan:      * Subacute osteomyelitis of right foot    POD 4 partial amputation right toe  Is clinically stable  BG close to goal. Adjusting insulin as needed for goal BG <180  BCx NGTD. Bone Cx with moderate Klebsiella ESBL, many Strep group B and a GNR pending ID/sens  Abx changed to ertapenem by ID. PICC line placed for 6 weeks of IV abx           Acute osteomyelitis of toe, right    S/p I&D with partial amputation of right toe     Diabetic ulcer of right great toe           Sepsis           Morbid obesity due to excess calories    Spent > 5 minutes on weight loss  education       Elevated lactic acid level    2/2 sepsis  Peripheral pulses normal with adequate resuscitation  Good appetite and PO intake       Type 2 diabetes mellitus with right diabetic foot infection    Poorly controlled  Adjusting insulin regimen for goal BG < 180       Cellulitis of right foot    With evidence of osteo  Management as above         VTE Risk Mitigation (From admission, onward)        Ordered     enoxaparin injection 40 mg  Daily      12/25/18 1528     IP VTE HIGH RISK PATIENT  Once      12/24/18 1712     Place HUGH hose  Until discontinued      12/23/18 2159     Place sequential compression device  Until discontinued      12/23/18 2159          Dispo: MARIAELENA Smith MD  Department of Hospital Medicine   [facility]

## 2018-12-28 NOTE — PHYSICIAN QUERY
PT Name: Khanh Galvan  MR #: [id number]    Physician Query Form - Cause and Effect Relationship Clarification      CDS: Ailyn Monsivais RN    Contact information:[email or web address]    This form is a permanent document in the medical record.     Query Date: December 28, 2018    By submitting this query, we are merely seeking further clarification of documentation. Please utilize your independent clinical judgment when addressing the question(s) below.    The Medical record contains the following:  Supporting Clinical Findings   Location in record   KLEBSIELLA PNEUMONIAE ESBL  STREPTOCOCCUS AGALACTIAE (GROUP B)  GRAM NEGATIVE LILIANA, NON-LACTOSE                                                                                    Wound Culture 12/24   Sepsis secondary to cellulitis and osteomyelitis the right lower extremity  Complications of subacute diabetic ft ulceration.  As evidenced by history, physical exam, imaging, and elevated CRP/ESR  Previous infection with diabetic foot ulcer. May be polymicrobial given the location and nature of the infection.  Blood cultures will likely be of low yield.    Ideally a bone biopsy would be obtained prior to initiating antibiotics, however she was given empiric antibiotics in the ED so we will continue given his systemic symptoms.  Will tailor antibiotic regimen according to culture & sensitivity results, should a bone biopsy be performed  Maintain euvolemic status with IV fluids  Consider MRI for further evaluation of extent of bone infection  Consider consulting infectious disease for further inpatient as well as outpatient follow-up  Podiatry consulted                                                                                                                                                                                       H&P 12/24   Subacute osteomyelitis of right foot  POD 3 partial amputation right toe  Is clinically stable  BG close to goal. Adjusting  insulin as needed for goal BG <180  BCx NGTD. Bone Cx with moderate Klebsiella ESBL, many Strep group B and a GNR pending ID/sens  Abx changed to ertapenem by ID. PICC line placed for 6 weeks of IV abx   H&P 12/24         Provider, please clarify if there is any correlation between KLEBSIELLA PNEUMONIAE ESBL, STREPTOCOCCUS AGALACTIAE (GROUP B), GRAM NEGATIVE LILIANA, NON-LACTOSE  and Sepsis/osteomyelitis/cellulitis.    Please Select All That Apply:           Are the conditions:    [x  ] Due to or associated with each other    [  ] KLEBSIELLA PNEUMONIAE ESBL    [  ] STREPTOCOCCUS AGALACTIAE (GROUP B)    [  ] GRAM NEGATIVE LILIANA, NON-LACTOSE      [  ] Unrelated to each other    [  ] KLEBSIELLA PNEUMONIAE ESBL    [  ] STREPTOCOCCUS AGALACTIAE (GROUP B)    [  ] GRAM NEGATIVE LILIANA, NON-LACTOSE    [  ] Other (Please Specify): _________________________   [  ] Clinically Undetermined

## 2018-12-28 NOTE — PLAN OF CARE
Problem: Adult Inpatient Plan of Care  Goal: Plan of Care Review  Outcome: Ongoing (interventions implemented as appropriate)   12/28/18 4025   Plan of Care Review   Plan of Care Reviewed With patient   Progress no change     Patient oriented x4 and cooperative.  BCG monitored and SSI/sched insulin administered.  IV abx administered as ordered.  Contact Isolation maintained.  Pain assessed frequently.  Up with assist x2. R ft dressing in place CDI  w/ boot. Will continued to monitor.

## 2018-12-28 NOTE — ASSESSMENT & PLAN NOTE
POD 4 partial amputation right toe  Is clinically stable  BG close to goal. Adjusting insulin as needed for goal BG <180  BCx NGTD. Bone Cx with moderate Klebsiella ESBL, many Strep group B and a GNR pending ID/sens  Abx changed to ertapenem by ID. PICC line placed for 6 weeks of IV abx

## 2018-12-28 NOTE — PT/OT/SLP PROGRESS
Physical Therapy Treatment    Patient Name:  Khanh Galvan   MRN:  [id number]    Recommendations:     Discharge Recommendations:  (home with home health and family assistance)   Discharge Equipment Recommendations: none   Barriers to discharge: None    Assessment:     Khanh Galvan is a 58 y.o. male admitted with a medical diagnosis of Subacute osteomyelitis of right foot.  He presents with the following impairments/functional limitations:  weakness, impaired functional mobilty, gait instability, impaired balance, decreased lower extremity function, impaired skin, decreased safety awareness(heel touch weight bearing in CAM boot to R LE).    Rehab Prognosis: Good; patient would benefit from acute skilled PT services to address these deficits and reach maximum level of function.    Recent Surgery: Procedure(s) (LRB):  INCISION AND DRAINAGE (Right)  AMPUTATION, TOE PARTIAL RIGHT GREAT TOE (Right) 4 Days Post-Op    Plan:     During this hospitalization, patient to be seen 6 x/week to address the identified rehab impairments via gait training, therapeutic activities, therapeutic exercises and progress toward the following goals:    · Plan of Care Expires:  01/03/19    Subjective     Chief Complaint: Did not sleep last night because he could not get comfortable.   Patient/Family Comments/goals: To walk.   Pain/Comfort:  · Pain Rating 1: 0/10      Objective:     Communicated with nurse Bosch prior to session.  Patient found supine in bed with telemetry, PICC line  upon PT entry to room.     General Precautions: Standard, fall, contact   Orthopedic Precautions:(heel touch weight bearing to R LE)   Braces: (CAM boot to R LE)     Functional Mobility:  · Bed Mobility:     · Supine to Sit: minimum assistance  · Transfers:     · Sit to Stand:  minimum assistance with rolling walker  · Gait:  Patient ambulated 46ft with Rolling Walker, heel touch weight bearing to R LE in CAM boot, and SBA using 3-point gait. Patient  demonstrated decreased ajay, decreased velocity of limb motion and decreased step length during gait due to impaired balance and decreased strength.    AM-PAC 6 CLICK MOBILITY  Turning over in bed (including adjusting bedclothes, sheets and blankets)?: 4  Sitting down on and standing up from a chair with arms (e.g., wheelchair, bedside commode, etc.): 3  Moving from lying on back to sitting on the side of the bed?: 3  Moving to and from a bed to a chair (including a wheelchair)?: 3  Need to walk in hospital room?: 3  Climbing 3-5 steps with a railing?: 2  Basic Mobility Total Score: 18     Therapeutic Activities and Exercises:  Patient educated in and performed B LE seated therex 2x15 for A/P (left only), LAQ, hip flex, and pillow squeezes.    Patient left reclined in bedside chair with all lines intact, call button in reach and nurse Hiro notified.    GOALS:   Multidisciplinary Problems     Physical Therapy Goals        Problem: Physical Therapy Goal    Goal Priority Disciplines Outcome Goal Variances Interventions   Physical Therapy Goal     PT, PT/OT      Description:  Goals to be met by: 1/3/19    Patient will increase functional independence with mobility by performin. Supine to sit with supervision  2. Sit to stand transfer with supervision  3. Gait x100 feet with supervision using Rolling Walker  4. Lower extremity exercise program x30 reps per handout, with supervision                      Time Tracking:     PT Received On: 18  PT Start Time: 1115     PT Stop Time: 1140  PT Total Time (min): 25 min     Billable Minutes: Gait Training  13 and Therapeutic Exercise 12    Treatment Type: Treatment  PT/PTA: PT     PTA Visit Number: 0     Shayna Jeffers, PT  2018

## 2018-12-28 NOTE — SUBJECTIVE & OBJECTIVE
Interval History: feels well. BG better but still not at goal.     Review of Systems   Constitutional: Negative.    Respiratory: Negative.    Cardiovascular: Negative.    Gastrointestinal: Negative.      Objective:     Vital Signs (Most Recent):  Temp: 98.5 °F (36.9 °C) (12/28/18 1202)  Pulse: 83 (12/28/18 1202)  Resp: 18 (12/28/18 1202)  BP: 106/61 (12/28/18 1202)  SpO2: 98 % (12/28/18 1202) Vital Signs (24h Range):  Temp:  [97.8 °F (36.6 °C)-98.8 °F (37.1 °C)] 98.5 °F (36.9 °C)  Pulse:  [74-86] 83  Resp:  [18-20] 18  SpO2:  [97 %-98 %] 98 %  BP: (106-200)/() 106/61     Weight: 120.8 kg (266 lb 6.4 oz)  Body mass index is 37.16 kg/m².    Intake/Output Summary (Last 24 hours) at 12/28/2018 1554  Last data filed at 12/28/2018 1219  Gross per 24 hour   Intake 720 ml   Output 625 ml   Net 95 ml      Physical Exam   Constitutional: He is oriented to person, place, and time. He appears well-developed. No distress.   Cardiovascular: Normal rate, regular rhythm, normal heart sounds and intact distal pulses.   Pulmonary/Chest: Effort normal and breath sounds normal.   Abdominal: Soft. Bowel sounds are normal.   Neurological: He is alert and oriented to person, place, and time.   Skin: He is not diaphoretic.   Right foot wrapped   Nursing note and vitals reviewed.      Significant Labs: All pertinent labs within the past 24 hours have been reviewed.    Significant Imaging: I have reviewed all pertinent imaging results/findings within the past 24 hours.  I have reviewed and interpreted all pertinent imaging results/findings within the past 24 hours.

## 2018-12-28 NOTE — PT/OT/SLP PROGRESS
Occupational Therapy   Treatment    Name: Khanh Galvan  MRN: [id number]  Admitting Diagnosis:  Subacute osteomyelitis of right foot  4 Days Post-Op    Recommendations:     Discharge Recommendations: (home with HH services and family assist)  Discharge Equipment Recommendations:  none  Barriers to discharge:  None    Subjective     Pain/Comfort:  · Pain Rating 1: (did not rate back pain)    Objective:       Patient found in bed with  telemetry, PICC line upon OT entry to room.    General Precautions: Standard, contact   Orthopedic Precautions:(heel touch weight bearing to RLE)   Braces: (CAM boot to RLE)     Occupational Performance:    Bed Mobility:    · N/T     Functional Mobility/Transfers:  · Functional Mobility: The patient refused to get OOB 2* fatigue.    Activities of Daily Living:  · N/T      [facility] 6 Click ADL: 20    Treatment & Education:  Pt was educated in use of theraband for UE therex. Pt was able to perform UE therex using red theraband. Pt was able to perform B shldr horiz abd x15, B shldr flex and abd x15 and B elbow ext and flexion x15 reps. Pt was able to perform after demo and rest between reps. The patient was given red theraband and written HEP for home use.    Patient left HOB elevated with all lines intact and call button in reach  Education:    Assessment:     Khanh Galvan is a 58 y.o. male with a medical diagnosis of Subacute osteomyelitis of right foot.  He presents with the following performance deficits affecting function are weakness, impaired endurance, impaired self care skills, impaired balance, impaired functional mobilty, decreased safety awareness, pain, gait instability, decreased upper extremity function, decreased lower extremity function, orthopedic precautions, impaired skin, edema. The patient tolerated UE therex with rest between reps.    Rehab Prognosis:  Good; patient would benefit from acute skilled OT services to address these deficits and reach maximum level of  function.       Plan:     Patient to be seen 5 x/week to address the above listed problems via self-care/home management, therapeutic activities, therapeutic exercises  · Plan of Care Expires: 01/10/19  · Plan of Care Reviewed with: patient    This Plan of care has been discussed with the patient who was involved in its development and understands and is in agreement with the identified goals and treatment plan    GOALS:   Multidisciplinary Problems     Occupational Therapy Goals        Problem: Occupational Therapy Goal    Goal Priority Disciplines Outcome Interventions   Occupational Therapy Goal     OT, PT/OT Ongoing (interventions implemented as appropriate)    Description:  Goals to be met by: 1/10/18    Patient will increase functional independence with ADLs by performing:    UE Dressing with Stand-by Assistance.  LE Dressing with Stand-by Assistance.  Grooming while standing at sink with Stand-by Assistance.  Supine to sit with Stand-by Assistance.  Step transfer with Stand-by Assistance  Toilet transfer to toilet with Stand-by Assistance.  Upper extremity exercise program x15 reps per handout, with assistance as needed. Met 12/28/18                     Time Tracking:     OT Date of Treatment: 12/28/18  OT Start Time: 1327  OT Stop Time: 1343  OT Total Time (min): 16 min    Billable Minutes:Therapeutic Exercise 16    Claudia Augustine OT  12/28/2018

## 2018-12-28 NOTE — PROGRESS NOTES
TN met with patient and spoke with patient's daughter in law Viry via phone to inform that [facility] is the only in-network facility with his insurance. TN also informed patient and Viry that his clinicals were sent to [facility] which are still under review.

## 2018-12-29 LAB
BACTERIA SPEC AEROBE CULT: NORMAL
POCT GLUCOSE: 117 MG/DL (ref 70–110)
POCT GLUCOSE: 157 MG/DL (ref 70–110)
POCT GLUCOSE: 179 MG/DL (ref 70–110)
POCT GLUCOSE: 257 MG/DL (ref 70–110)

## 2018-12-29 PROCEDURE — 11000001 HC ACUTE MED/SURG PRIVATE ROOM

## 2018-12-29 PROCEDURE — 97530 THERAPEUTIC ACTIVITIES: CPT

## 2018-12-29 PROCEDURE — 63600175 PHARM REV CODE 636 W HCPCS: Performed by: INTERNAL MEDICINE

## 2018-12-29 PROCEDURE — A4216 STERILE WATER/SALINE, 10 ML: HCPCS | Performed by: INTERNAL MEDICINE

## 2018-12-29 PROCEDURE — 25000003 PHARM REV CODE 250: Performed by: INTERNAL MEDICINE

## 2018-12-29 PROCEDURE — 97116 GAIT TRAINING THERAPY: CPT

## 2018-12-29 RX ORDER — INSULIN ASPART 100 [IU]/ML
1-10 INJECTION, SOLUTION INTRAVENOUS; SUBCUTANEOUS
Status: DISCONTINUED | OUTPATIENT
Start: 2018-12-29 | End: 2019-01-09 | Stop reason: HOSPADM

## 2018-12-29 RX ADMIN — INSULIN ASPART 10 UNITS: 100 INJECTION, SOLUTION INTRAVENOUS; SUBCUTANEOUS at 09:12

## 2018-12-29 RX ADMIN — INSULIN ASPART 2 UNITS: 100 INJECTION, SOLUTION INTRAVENOUS; SUBCUTANEOUS at 09:12

## 2018-12-29 RX ADMIN — INSULIN ASPART 10 UNITS: 100 INJECTION, SOLUTION INTRAVENOUS; SUBCUTANEOUS at 12:12

## 2018-12-29 RX ADMIN — ENOXAPARIN SODIUM 40 MG: 100 INJECTION SUBCUTANEOUS at 05:12

## 2018-12-29 RX ADMIN — INSULIN ASPART 6 UNITS: 100 INJECTION, SOLUTION INTRAVENOUS; SUBCUTANEOUS at 12:12

## 2018-12-29 RX ADMIN — Medication 10 ML: at 12:12

## 2018-12-29 RX ADMIN — INSULIN ASPART 1 UNITS: 100 INJECTION, SOLUTION INTRAVENOUS; SUBCUTANEOUS at 09:12

## 2018-12-29 RX ADMIN — ERTAPENEM SODIUM 1 G: 1 INJECTION, POWDER, LYOPHILIZED, FOR SOLUTION INTRAMUSCULAR; INTRAVENOUS at 12:12

## 2018-12-29 RX ADMIN — INSULIN ASPART 10 UNITS: 100 INJECTION, SOLUTION INTRAVENOUS; SUBCUTANEOUS at 05:12

## 2018-12-29 RX ADMIN — LISINOPRIL 40 MG: 20 TABLET ORAL at 09:12

## 2018-12-29 RX ADMIN — Medication 10 ML: at 05:12

## 2018-12-29 NOTE — ASSESSMENT & PLAN NOTE
"POD 5 partial amputation right toe  Is clinically stable  BG close to goal. Adjusting insulin as needed for goal BG <180  BCx NGTD. Bone Cx with moderate Klebsiella ESBL, many Strep group B and a GNR pending ID/sens  Pathology of proximal bone showed "Viable bone exhibiting no inflammatory infiltrates"  Will discuss these results with ID to see if patient still needs extended abx treatment with wound care vs just wound care.       "

## 2018-12-29 NOTE — PLAN OF CARE
Problem: Adult Inpatient Plan of Care  Goal: Plan of Care Review  Outcome: Ongoing (interventions implemented as appropriate)   12/29/18 1510   Plan of Care Review   Plan of Care Reviewed With patient   Progress no change     Patient oriented x4 and cooperative.  BCG monitored and SSI/sched insulin administered.  IV abx administered as ordered.  Contact Isolation maintained.  Pain assessed frequently. Voiding with intermittent incontinence.  Up with assist x2. R ft dressing in place CDI  w/ boot. No notable changes. Will continued to monitor.

## 2018-12-29 NOTE — PROGRESS NOTES
[facility] Hospital Medicine  Progress Note    Patient Name: Khanh Galvan  MRN: [id number]  Patient Class: IP- Inpatient   Admission Date: 12/23/2018  Length of Stay: 6 days  Attending Physician: Viry Sher MD  Primary Care Provider: JONY Pena        Subjective:     Principal Problem:Subacute osteomyelitis of right foot    HPI:    Khanh Galvan is a 58 y.o. male that (in part)  has a past medical history of Diabetes mellitus, Hypertension, and Subacute osteomyelitis.  has a past surgical history that includes left foot. Presents to [facility] Emergency Department by EMS who was called for fatigue.  When EMS arrived the patient was lethargic and nearly attended.  He was found to be hyperglycemic and hypotensive.  IV fluids were initiated and mental status improved along with his blood pressure.  He was found to have a right-sided diabetic ft lesion.  He reports sustaining injury from stubbing his right great toe on the stairs.  He developed a painful, red, swollen, and warm lesion to his right great toe that has worsened over the last 3 days.  Unfortunately the patient is a poor historian and history is limited.      In the emergency department routine laboratory studies were obtained and x-ray the right foot was performed.  He had elevated ESR, CRP, and physical exam was consistent with diabetic foot lesion with osteomyelitis.  He was given IV fluids and started on empiric antibiotic therapy due to associated cellulitis and concern for possible bacteremia with systemic infection causing infectious encephalopathy.    Hospital medicine has been asked to admit for further evaluation and treatment.     Hospital Course:  No notes on file    Interval History: feels well. BG better but still not at goal. Pathology report of proximal bone is clear from inflammatory changes.     Review of Systems   Constitutional: Negative.    Respiratory: Negative.   "  Cardiovascular: Negative.    Gastrointestinal: Negative.      Objective:     Vital Signs (Most Recent):  Temp: 98.2 °F (36.8 °C) (12/29/18 1148)  Pulse: 79 (12/29/18 1148)  Resp: 17 (12/29/18 1148)  BP: (!) 107/58 (12/29/18 1148)  SpO2: 98 % (12/29/18 1148) Vital Signs (24h Range):  Temp:  [97.9 °F (36.6 °C)-99.5 °F (37.5 °C)] 98.2 °F (36.8 °C)  Pulse:  [71-86] 79  Resp:  [17-20] 17  SpO2:  [96 %-98 %] 98 %  BP: (106-147)/(58-84) 107/58     Weight: 120.8 kg (266 lb 6.4 oz)  Body mass index is 37.16 kg/m².    Intake/Output Summary (Last 24 hours) at 12/29/2018 1257  Last data filed at 12/29/2018 0900  Gross per 24 hour   Intake 240 ml   Output 475 ml   Net -235 ml      Physical Exam   Constitutional: He is oriented to person, place, and time. He appears well-developed. No distress.   Cardiovascular: Normal rate, regular rhythm, normal heart sounds and intact distal pulses.   Pulmonary/Chest: Effort normal and breath sounds normal.   Abdominal: Soft. Bowel sounds are normal.   Neurological: He is alert and oriented to person, place, and time.   Skin: He is not diaphoretic.   Right foot wrapped   Nursing note and vitals reviewed.      Significant Labs: All pertinent labs within the past 24 hours have been reviewed.    Significant Imaging: I have reviewed all pertinent imaging results/findings within the past 24 hours.  I have reviewed and interpreted all pertinent imaging results/findings within the past 24 hours.    Assessment/Plan:      * Subacute osteomyelitis of right foot    POD 5 partial amputation right toe  Is clinically stable  BG close to goal. Adjusting insulin as needed for goal BG <180  BCx NGTD. Bone Cx with moderate Klebsiella ESBL, many Strep group B and a GNR pending ID/sens  Pathology of proximal bone showed "Viable bone exhibiting no inflammatory infiltrates"  Will discuss these results with ID to see if patient still needs extended abx treatment with wound care vs just wound care.          Acute " osteomyelitis of toe, right    S/p I&D with partial amputation of right toe     Diabetic ulcer of right great toe           Sepsis           Morbid obesity due to excess calories    Spent > 5 minutes on weight loss education       Elevated lactic acid level    2/2 sepsis  Peripheral pulses normal with adequate resuscitation  Good appetite and PO intake       Type 2 diabetes mellitus with right diabetic foot infection    Poorly controlled  Adjusting insulin regimen for goal BG < 180       Cellulitis of right foot    With evidence of osteo  Management as above         VTE Risk Mitigation (From admission, onward)        Ordered     enoxaparin injection 40 mg  Daily      12/25/18 1528     IP VTE HIGH RISK PATIENT  Once      12/24/18 1712     Place HUGH hose  Until discontinued      12/23/18 2159     Place sequential compression device  Until discontinued      12/23/18 2159              Viry Smith MD  Department of Hospital Medicine   [facility]

## 2018-12-29 NOTE — PLAN OF CARE
Problem: Physical Therapy Goal  Goal: Physical Therapy Goal  Goals to be met by: 1/3/19    Patient will increase functional independence with mobility by performin. Supine to sit with supervision  2. Sit to stand transfer with supervision  3. Gait x100 feet with supervision using Rolling Walker  4. Lower extremity exercise program x30 reps per handout, with supervision     Outcome: Ongoing (interventions implemented as appropriate)  Pt ambulated ~ 40 ft with RW, CGA ( heel touch WB in CAM boot RLE ). V/T cues for safety, proper technique and walker management. Pt will benefit from further therapy in order to get back to PLOF.

## 2018-12-29 NOTE — SUBJECTIVE & OBJECTIVE
Interval History: feels well. BG better but still not at goal. Pathology report of proximal bone is clear from inflammatory changes.     Review of Systems   Constitutional: Negative.    Respiratory: Negative.    Cardiovascular: Negative.    Gastrointestinal: Negative.      Objective:     Vital Signs (Most Recent):  Temp: 98.2 °F (36.8 °C) (12/29/18 1148)  Pulse: 79 (12/29/18 1148)  Resp: 17 (12/29/18 1148)  BP: (!) 107/58 (12/29/18 1148)  SpO2: 98 % (12/29/18 1148) Vital Signs (24h Range):  Temp:  [97.9 °F (36.6 °C)-99.5 °F (37.5 °C)] 98.2 °F (36.8 °C)  Pulse:  [71-86] 79  Resp:  [17-20] 17  SpO2:  [96 %-98 %] 98 %  BP: (106-147)/(58-84) 107/58     Weight: 120.8 kg (266 lb 6.4 oz)  Body mass index is 37.16 kg/m².    Intake/Output Summary (Last 24 hours) at 12/29/2018 1257  Last data filed at 12/29/2018 0900  Gross per 24 hour   Intake 240 ml   Output 475 ml   Net -235 ml      Physical Exam   Constitutional: He is oriented to person, place, and time. He appears well-developed. No distress.   Cardiovascular: Normal rate, regular rhythm, normal heart sounds and intact distal pulses.   Pulmonary/Chest: Effort normal and breath sounds normal.   Abdominal: Soft. Bowel sounds are normal.   Neurological: He is alert and oriented to person, place, and time.   Skin: He is not diaphoretic.   Right foot wrapped   Nursing note and vitals reviewed.      Significant Labs: All pertinent labs within the past 24 hours have been reviewed.    Significant Imaging: I have reviewed all pertinent imaging results/findings within the past 24 hours.  I have reviewed and interpreted all pertinent imaging results/findings within the past 24 hours.

## 2018-12-29 NOTE — PT/OT/SLP PROGRESS
Physical Therapy Treatment    Patient Name:  Khanh Galvan   MRN:  [id number]    Recommendations:     Discharge Recommendations:  (home with home health and family assistance)   Discharge Equipment Recommendations: none   Barriers to discharge: None    Assessment:     Khanh Galvan is a 58 y.o. male admitted with a medical diagnosis of Subacute osteomyelitis of right foot.  He presents with the following impairments/functional limitations:  weakness, impaired endurance, impaired self care skills, impaired functional mobilty, gait instability, impaired balance, decreased upper extremity function, decreased lower extremity function, decreased ROM, edema, orthopedic precautions, pain, decreased safety awareness, impaired skin . Pt ambulated ~ 40 ft with RW, CGA ( heel touch WB in CAM boot RLE ). V/T cues for safety, proper technique and walker management. Pt will benefit from further therapy in order to get back to PLOF.     Rehab Prognosis: Good; patient would benefit from acute skilled PT services to address these deficits and reach maximum level of function.    Recent Surgery: Procedure(s) (LRB):  INCISION AND DRAINAGE (Right)  AMPUTATION, TOE PARTIAL RIGHT GREAT TOE (Right) 5 Days Post-Op    Plan:     During this hospitalization, patient to be seen 6 x/week to address the identified rehab impairments via gait training, therapeutic activities, therapeutic exercises and progress toward the following goals:    · Plan of Care Expires:  01/03/19    Subjective     Chief Complaint: weakness and fatigue   Patient/Family Comments/goals: to get back to PLOF   Pain/Comfort:  · Pain Rating 1: 4/10  · Location - Side 1: Right  · Pain Addressed 1: Pre-medicate for activity, Nurse notified  · Pain Rating Post-Intervention 1: 4/10      Objective:     Communicated with nurse Bosch prior to session.  Patient found all lines intact, call button in reach and bed alarm on bed alarm, PICC line, telemetry  upon PT entry to room.      General Precautions: Standard, fall, diabetic, special contact   Orthopedic Precautions:(heel touch weight bearing to R LE)   Braces:  CAM boot RLE     Functional Mobility:  · Bed Mobility:     · Rolling Left:  stand by assistance  · Rolling Right: stand by assistance  · Scooting: contact guard assistance  · Supine to Sit: minimum assistance, HOB elevated, bedside rail.   · Transfers:     · Sit to Stand: x 3 trials from bed minimum assistance with rolling walker. V/T cues for safety technique and walker management.   · Gait: Pt ambulated ~ 40 ft with RW, CGA ( heel touch WB in CAM boot RLE ). Noted with decreased ajay, decreased step length. V/T cues for head/trunk extension . V/T cues for safety, proper technique and walker management.       AM-PAC 6 CLICK MOBILITY  Turning over in bed (including adjusting bedclothes, sheets and blankets)?: 4  Sitting down on and standing up from a chair with arms (e.g., wheelchair, bedside commode, etc.): 3  Moving from lying on back to sitting on the side of the bed?: 3  Moving to and from a bed to a chair (including a wheelchair)?: 3  Need to walk in hospital room?: 3  Climbing 3-5 steps with a railing?: 2  Basic Mobility Total Score: 18       Therapeutic Activities and Exercises:   pt performed bed mobility, transfer and gait training as above.   Pt tolerated standing balance using RW, pt able maintain static standing balance with CGA/SBA while  getting neena-care and to don/doff diaper. Pt required total A for self care .     Patient left up in reclined chair on cushion with all lines intact, call button in reach and nurse Hiro and PCT  notified..    GOALS:   Multidisciplinary Problems     Physical Therapy Goals        Problem: Physical Therapy Goal    Goal Priority Disciplines Outcome Goal Variances Interventions   Physical Therapy Goal     PT, PT/OT Ongoing (interventions implemented as appropriate)     Description:  Goals to be met by: 1/3/19    Patient will  increase functional independence with mobility by performin. Supine to sit with supervision  2. Sit to stand transfer with supervision  3. Gait x100 feet with supervision using Rolling Walker  4. Lower extremity exercise program x30 reps per handout, with supervision                      Time Tracking:     PT Received On: 18  PT Start Time: 1453     PT Stop Time: 1517  PT Total Time (min): 24 min     Billable Minutes: Gait Training 12 and Therapeutic Activity 12    Treatment Type: Treatment  PT/PTA: PTA     PTA Visit Number: 1     Veronica Curry, PTA  2018

## 2018-12-30 LAB
POCT GLUCOSE: 140 MG/DL (ref 70–110)
POCT GLUCOSE: 154 MG/DL (ref 70–110)
POCT GLUCOSE: 207 MG/DL (ref 70–110)
POCT GLUCOSE: 250 MG/DL (ref 70–110)

## 2018-12-30 PROCEDURE — 63600175 PHARM REV CODE 636 W HCPCS: Performed by: INTERNAL MEDICINE

## 2018-12-30 PROCEDURE — A4216 STERILE WATER/SALINE, 10 ML: HCPCS | Performed by: INTERNAL MEDICINE

## 2018-12-30 PROCEDURE — 11000001 HC ACUTE MED/SURG PRIVATE ROOM

## 2018-12-30 PROCEDURE — 25000003 PHARM REV CODE 250: Performed by: INTERNAL MEDICINE

## 2018-12-30 RX ADMIN — INSULIN ASPART 4 UNITS: 100 INJECTION, SOLUTION INTRAVENOUS; SUBCUTANEOUS at 08:12

## 2018-12-30 RX ADMIN — INSULIN ASPART 2 UNITS: 100 INJECTION, SOLUTION INTRAVENOUS; SUBCUTANEOUS at 12:12

## 2018-12-30 RX ADMIN — LISINOPRIL 40 MG: 20 TABLET ORAL at 08:12

## 2018-12-30 RX ADMIN — INSULIN ASPART 10 UNITS: 100 INJECTION, SOLUTION INTRAVENOUS; SUBCUTANEOUS at 12:12

## 2018-12-30 RX ADMIN — Medication 10 ML: at 08:12

## 2018-12-30 RX ADMIN — ENOXAPARIN SODIUM 40 MG: 100 INJECTION SUBCUTANEOUS at 05:12

## 2018-12-30 RX ADMIN — Medication 10 ML: at 02:12

## 2018-12-30 RX ADMIN — INSULIN ASPART 10 UNITS: 100 INJECTION, SOLUTION INTRAVENOUS; SUBCUTANEOUS at 05:12

## 2018-12-30 RX ADMIN — INSULIN ASPART 10 UNITS: 100 INJECTION, SOLUTION INTRAVENOUS; SUBCUTANEOUS at 08:12

## 2018-12-30 NOTE — PROGRESS NOTES
Patient with discharge orders. [facility] contacted [facility]  [facility] only agency that covers his area. Awaiting return call from agency. Attempted to make follow up podiatry appt. All appt's are full electronically. Left electronic wait list message. Have spoke with patient. He is working on his transportation, thinks may be about 2:30. Will update as progresses.

## 2018-12-30 NOTE — ASSESSMENT & PLAN NOTE
"POD 6 partial amputation right toe  Is clinically stable  BG close to goal. Adjusting insulin as needed for goal BG <180  BCx NGTD. Bone Cx with moderate Klebsiella ESBL, many Strep group B and Hafnia alvei   Pathology of proximal bone showed "Viable bone exhibiting no inflammatory infiltrates"  Discussed with ID who recommends no further abx treatment, just wound care  Wound care per Podiatry      "

## 2018-12-30 NOTE — PROGRESS NOTES
[facility] Hospital Medicine  Progress Note    Patient Name: Khanh Galvan  MRN: [id number]  Patient Class: IP- Inpatient   Admission Date: 12/23/2018  Length of Stay: 7 days  Attending Physician: Viry Sher MD  Primary Care Provider: JONY Pena        Subjective:     Principal Problem:Subacute osteomyelitis of right foot    HPI:    Khanh Galvan is a 58 y.o. male that (in part)  has a past medical history of Diabetes mellitus, Hypertension, and Subacute osteomyelitis.  has a past surgical history that includes left foot. Presents to [facility] Emergency Department by EMS who was called for fatigue.  When EMS arrived the patient was lethargic and nearly attended.  He was found to be hyperglycemic and hypotensive.  IV fluids were initiated and mental status improved along with his blood pressure.  He was found to have a right-sided diabetic ft lesion.  He reports sustaining injury from stubbing his right great toe on the stairs.  He developed a painful, red, swollen, and warm lesion to his right great toe that has worsened over the last 3 days.  Unfortunately the patient is a poor historian and history is limited.      In the emergency department routine laboratory studies were obtained and x-ray the right foot was performed.  He had elevated ESR, CRP, and physical exam was consistent with diabetic foot lesion with osteomyelitis.  He was given IV fluids and started on empiric antibiotic therapy due to associated cellulitis and concern for possible bacteremia with systemic infection causing infectious encephalopathy.    Hospital medicine has been asked to admit for further evaluation and treatment.     Hospital Course:  Mr Galvan presented with subacute osteomyelitis of right big toe. Podiatry consulted. Underwent partial amputation of right toe. Biopsy of amputated bone showed moderate Klebsiella (ESBL), many Strep Group B and few Hafnia alvei. ID  "consulted. Abx tailored to ertapenem. Per son, patient lives with him, his wife and a baby who is a heart patient and were concerned about baby being exposed to bacteria. SNF was planned. Biopsy of proximal bone resulted as "Viable bone exhibiting no inflammatory infiltrates". Discussed with ID who recommended no further abx treatment, just wound care. Patient can go home with HH. Has a walker at home. Family requested another day as they live very far and need to plan ahead to pick him up. Patient is in agreement with [facility] if needed. Will discuss with SW.     Interval History: feels well. BG better     Review of Systems   Constitutional: Negative.    Respiratory: Negative.    Cardiovascular: Negative.    Gastrointestinal: Negative.      Objective:     Vital Signs (Most Recent):  Temp: 99.2 °F (37.3 °C) (12/30/18 0838)  Pulse: 81 (12/30/18 0838)  Resp: 18 (12/30/18 0838)  BP: (!) 122/59 (12/30/18 0838)  SpO2: 95 % (12/30/18 0838) Vital Signs (24h Range):  Temp:  [98 °F (36.7 °C)-99.2 °F (37.3 °C)] 99.2 °F (37.3 °C)  Pulse:  [78-89] 81  Resp:  [18] 18  SpO2:  [94 %-99 %] 95 %  BP: (122-152)/(58-76) 122/59     Weight: 120.8 kg (266 lb 6.4 oz)  Body mass index is 37.16 kg/m².    Intake/Output Summary (Last 24 hours) at 12/30/2018 1342  Last data filed at 12/30/2018 0300  Gross per 24 hour   Intake --   Output 975 ml   Net -975 ml      Physical Exam   Constitutional: He is oriented to person, place, and time. He appears well-developed. No distress.   Cardiovascular: Normal rate, regular rhythm, normal heart sounds and intact distal pulses.   Pulmonary/Chest: Effort normal and breath sounds normal.   Abdominal: Soft. Bowel sounds are normal.   Neurological: He is alert and oriented to person, place, and time.   Skin: He is not diaphoretic.   Right foot wrapped   Nursing note and vitals reviewed.      Significant Labs: All pertinent labs within the past 24 hours have been reviewed.    Significant Imaging: I have " "reviewed all pertinent imaging results/findings within the past 24 hours.  I have reviewed and interpreted all pertinent imaging results/findings within the past 24 hours.    Assessment/Plan:      * Subacute osteomyelitis of right foot    POD 6 partial amputation right toe  Is clinically stable  BG close to goal. Adjusting insulin as needed for goal BG <180  BCx NGTD. Bone Cx with moderate Klebsiella ESBL, many Strep group B and Hafnia alvei   Pathology of proximal bone showed "Viable bone exhibiting no inflammatory infiltrates"  Discussed with ID who recommends no further abx treatment, just wound care  Wound care per Podiatry         Acute osteomyelitis of toe, right    S/p I&D with partial amputation of right toe     Diabetic ulcer of right great toe           Sepsis           Morbid obesity due to excess calories    Spent > 5 minutes on weight loss education       Elevated lactic acid level    2/2 sepsis  Peripheral pulses normal with adequate resuscitation  Good appetite and PO intake       Type 2 diabetes mellitus with right diabetic foot infection    Poorly controlled  Adjusting insulin regimen for goal BG < 180       Cellulitis of right foot    With evidence of osteo  Management as above         VTE Risk Mitigation (From admission, onward)        Ordered     enoxaparin injection 40 mg  Daily      12/25/18 1528     IP VTE HIGH RISK PATIENT  Once      12/24/18 1712     Place HUGH hose  Until discontinued      12/23/18 2159     Place sequential compression device  Until discontinued      12/23/18 2159          Dispo:  vs [facility]    Viry Smith MD  Department of Hospital Medicine   [facility]  "

## 2018-12-30 NOTE — NURSING
Patient received lying in bed resting. Patient was AAO X 3. Black boot noted to RLE.. Capillary refill good. Pulses present l ft and r pop

## 2018-12-30 NOTE — PLAN OF CARE
Problem: Adult Inpatient Plan of Care  Goal: Plan of Care Review  Outcome: Ongoing (interventions implemented as appropriate)   12/30/18 8676   Plan of Care Review   Plan of Care Reviewed With patient     Patient oriented x4 and cooperative.  BCG monitored and SSI/sched insulin administered.  IV abx administered as ordered.  Contact Isolation discontinued.  Pain assessed frequently. Voiding with intermittent incontinence.  Up with assist x2. R ft dressing in place CDI  w/ boot. No notable changes. Will continued to monitor.

## 2018-12-30 NOTE — CONSULTS
Discharge planning--per Dr Sher patient labs indicate that he no longer needs IV ATB's. She informs that he can discharge home with home health RN and Out Patient wound care.  believes patient no longer meets SNF criteria as originally planned as patient also is ambulating with walker in his boot. Patient has been staying with son, daughter in law and their infant who has a heart condition. Per assessment, daughter in law can help at home.   SW now had return call from [facility]  (noon), the only provider in his area. They have NO NEW  staff available until Wednesday. If accepted RN can assess on Wednesday. SW to fax orders, clinicals to [phone] then follow up with [facility] (due to the weekend and holiday). Possibly not able to process until tomorrow.  SHILPI attempted to make wound care follow up. UNABLE TO schedule appointment for wound care or for Dr Villanueva in Epic electronically. It must be scheduled thru the office. SHILPI left wait list request and Dr Carl placed out patient order so that have reminder this way.  Patient was unable to contact his daughter in law to arrange transportation home. SHILPI left message for daughter, then reached daughter in law Viry.   Daughter in law reports that she is unable to discuss patient discharge home with her  until after 5 pm. He is a  an is out on water now. She cannot  patient due to care of her infant with heart condition. Additionally, the infant cannot be around anyone with infection. She is not sure her father in law can stay with them at this time.   Dr Sher then talked to daughter in law.   While Dr on phone with daughter in law, jozef Esparza, called back. She is out of state at this time, not available to assist as this time.    SHILPI spoke with Dr Sher. Due to all complications above,  reports that she is delaying discharge until tomorrow.  SHILPI updated information in fax to [facility] that plan is for tomorrow.   SHILPI will meet  with patient to inquire if he would consider alternate admission type (ICF) to nursing home until his foot is healed in event family cannot provide care. Plan to inquire if patient can stay with someone else as alternative.

## 2018-12-30 NOTE — PLAN OF CARE
[facility]    HOME HEALTH ORDERS  FACE TO FACE ENCOUNTER    Patient Name: Khanh Galvan  YOB: 1960    PCP: JONY Pena   PCP Address: [address] [facility] [address]  PCP Phone Number: [phone]  PCP Fax: [phone]    Encounter Date: 12/30/2018    Admit to Home Health    Diagnoses:  Active Hospital Problems    Diagnosis  POA    *Subacute osteomyelitis of right foot [M86.271]  Yes    Acute osteomyelitis of toe, right [M86.171]  Yes     Priority: 2     Morbid obesity due to excess calories [E66.01]  Yes     Body mass index is 37.16 kg/m².        Sepsis [A41.9]  Yes    Diabetic ulcer of right great toe [E11.621, L97.519]  Yes    Elevated lactic acid level [R79.89]  Yes    Cellulitis of right foot [L03.115]  Yes    Type 2 diabetes mellitus with right diabetic foot infection [E11.628, L08.9]  Yes      Resolved Hospital Problems   No resolved problems to display.       No future appointments.  Follow-up Information     Cary Villanueva DPM. Schedule an appointment as soon as possible for a visit on 12/30/2018.    Specialty:  Podiatry  Why:  call Monday to schedule appt for after New Year Day--you may be coming to the wound care clinic. I am unable to schedule for you today--Ladan Garcia [facility] [phone]  Contact information:  [address] [address] [phone]             Schedule an appointment as soon as possible for a visit with JONY Pena.    Specialty:  Family Medicine  Why:  hospital follow up  Contact information:  [address]  [address]  [facility] [facility] [place] [id number] [phone]                     I have seen and examined this patient face to face today. My clinical findings that support the need for the home health skilled services and home bound status are the following:  Requiring assistive device to leave home due to unsteady gait caused by  Infection and  Weakness/Debility.  Patient with medication mismanagement issues requiring home bound status as evidenced by  Poor understanding of medication regimen/dosage and Uncontrolled hyperglycemic/hypoglycemic events.  Mental confusion making it unsafe for patient to leave home alone due to  Dementia.    Allergies:Review of patient's allergies indicates:  No Known Allergies    Diet: cardiac diet and diabetic diet: 1800 calorie    Activities: ambulate in house with assistance    Nursing:   SN to complete comprehensive assessment including routine vital signs. Instruct on disease process and s/s of complications to report to MD. Review/verify medication list sent home with the patient at time of discharge  and instruct patient/caregiver as needed. Frequency may be adjusted depending on start of care date.    Notify MD if SBP > 160 or < 90; DBP > 90 or < 50; HR > 120 or < 50; Temp > 101    Diabetes Mellitus type 2   Check blood sugar:      Fingerstick blood sugar AC and HS      Report CBG < 60 or > 400 to physician.                                          Insulin Sliding Scale          Glucose  Novolog Insulin Subcutaneous        0 - 60   Orange juice or glucose tablet, hold insulin      No insulin   201-250  2 units   251-300  4 units   301-350  6 units   351-400  8 units   >400   10 units then call physician      Medications: Review discharge medications with patient and family and provide education.      Current Discharge Medication List      CONTINUE these medications which have NOT CHANGED    Details   gabapentin (NEURONTIN) 300 MG capsule Take 300 mg by mouth 3 (three) times daily.      insulin aspart U-100 (NOVOLOG) 100 unit/mL InPn pen Inject 10 Units into the skin 3 (three) times daily.  Qty: 1 Box, Refills: 0      insulin glargine,hum.rec.anlog (BASAGLAR KWIKPEN U-100 INSULIN SUBQ) Inject 40 Units into the skin 2 (two) times daily.       lisinopril (PRINIVIL,ZESTRIL) 20 MG tablet Take 20 mg by mouth once  daily.      tamsulosin (FLOMAX) 0.4 mg Cap Take 0.4 mg by mouth once daily.      glucose 4 GM chewable tablet Take 4 tablets (16 g total) by mouth as needed.  Refills: 12      polyethylene glycol (GLYCOLAX) 17 gram PwPk Take 17 g by mouth once daily.  Qty: 1 packet, Refills: 10

## 2018-12-30 NOTE — PROGRESS NOTES
Discharge plan--HOME HEALTH VS [facility] Nursing Home-  SW discussed updating plan with patient. He has no other relatives or friends to reside during his recovery. He does not think is son and daughter in law can provide for him while he has open wounds.   SW inquired if patient would be willing to go to a nursing home for a month to 6 weeks while he recovers from the surgery (NOT SNF, but assisted). Patient informs that he is willing and asks if can go to [facility] [address] as has been there before. SHILPI stated she can send referral and can call the  in am to attempt the placement.   SHILPI will send referral today via [facility] and will contact directly in am. SHILPI updated Dr Sher regarding the discussion.

## 2018-12-30 NOTE — SUBJECTIVE & OBJECTIVE
Interval History: feels well. BG better     Review of Systems   Constitutional: Negative.    Respiratory: Negative.    Cardiovascular: Negative.    Gastrointestinal: Negative.      Objective:     Vital Signs (Most Recent):  Temp: 99.2 °F (37.3 °C) (12/30/18 0838)  Pulse: 81 (12/30/18 0838)  Resp: 18 (12/30/18 0838)  BP: (!) 122/59 (12/30/18 0838)  SpO2: 95 % (12/30/18 0838) Vital Signs (24h Range):  Temp:  [98 °F (36.7 °C)-99.2 °F (37.3 °C)] 99.2 °F (37.3 °C)  Pulse:  [78-89] 81  Resp:  [18] 18  SpO2:  [94 %-99 %] 95 %  BP: (122-152)/(58-76) 122/59     Weight: 120.8 kg (266 lb 6.4 oz)  Body mass index is 37.16 kg/m².    Intake/Output Summary (Last 24 hours) at 12/30/2018 1342  Last data filed at 12/30/2018 0300  Gross per 24 hour   Intake --   Output 975 ml   Net -975 ml      Physical Exam   Constitutional: He is oriented to person, place, and time. He appears well-developed. No distress.   Cardiovascular: Normal rate, regular rhythm, normal heart sounds and intact distal pulses.   Pulmonary/Chest: Effort normal and breath sounds normal.   Abdominal: Soft. Bowel sounds are normal.   Neurological: He is alert and oriented to person, place, and time.   Skin: He is not diaphoretic.   Right foot wrapped   Nursing note and vitals reviewed.      Significant Labs: All pertinent labs within the past 24 hours have been reviewed.    Significant Imaging: I have reviewed all pertinent imaging results/findings within the past 24 hours.  I have reviewed and interpreted all pertinent imaging results/findings within the past 24 hours.

## 2018-12-30 NOTE — HOSPITAL COURSE
"Mr Galvan presented with subacute osteomyelitis of right big toe. Podiatry consulted. Underwent partial amputation of right toe. Biopsy of amputated bone showed moderate Klebsiella (ESBL), many Strep Group B and few Hafnia alvei. ID consulted. Abx tailored to ertapenem. Per son, patient lives with him, his wife and a baby who is a heart patient and were concerned about baby being exposed to bacteria. SNF was planned. Biopsy of proximal bone resulted as "Viable bone exhibiting no inflammatory infiltrates". Discussed with ID who recommended no further abx treatment, just wound care. Patient can go home with HH. Has a walker at home. Family requested another day as they live very far and need to plan ahead to pick him up.  Family then decided that they cannot take care of him at home. Patient is in agreement with intermediate NH. Patient discharged to [facility] with intermediate nursing care. Note glucoses have at times been elevated-- he may need further adjustment of his insulin regimen after discharge.   "

## 2018-12-31 LAB
POCT GLUCOSE: 150 MG/DL (ref 70–110)
POCT GLUCOSE: 219 MG/DL (ref 70–110)
POCT GLUCOSE: 229 MG/DL (ref 70–110)

## 2018-12-31 PROCEDURE — 63600175 PHARM REV CODE 636 W HCPCS: Performed by: HOSPITALIST

## 2018-12-31 PROCEDURE — 97110 THERAPEUTIC EXERCISES: CPT

## 2018-12-31 PROCEDURE — 99024 PR POST-OP FOLLOW-UP VISIT: ICD-10-PCS | Mod: ,,, | Performed by: PODIATRIST

## 2018-12-31 PROCEDURE — 63600175 PHARM REV CODE 636 W HCPCS: Performed by: INTERNAL MEDICINE

## 2018-12-31 PROCEDURE — 97116 GAIT TRAINING THERAPY: CPT

## 2018-12-31 PROCEDURE — 11000001 HC ACUTE MED/SURG PRIVATE ROOM

## 2018-12-31 PROCEDURE — 86580 TB INTRADERMAL TEST: CPT | Performed by: HOSPITALIST

## 2018-12-31 PROCEDURE — 25000003 PHARM REV CODE 250: Performed by: INTERNAL MEDICINE

## 2018-12-31 PROCEDURE — 99024 POSTOP FOLLOW-UP VISIT: CPT | Mod: ,,, | Performed by: PODIATRIST

## 2018-12-31 RX ORDER — INSULIN ASPART 100 [IU]/ML
12 INJECTION, SOLUTION INTRAVENOUS; SUBCUTANEOUS
Status: DISCONTINUED | OUTPATIENT
Start: 2018-12-31 | End: 2019-01-08

## 2018-12-31 RX ADMIN — INSULIN ASPART 1 UNITS: 100 INJECTION, SOLUTION INTRAVENOUS; SUBCUTANEOUS at 08:12

## 2018-12-31 RX ADMIN — Medication 5 UNITS: at 05:12

## 2018-12-31 RX ADMIN — INSULIN ASPART 12 UNITS: 100 INJECTION, SOLUTION INTRAVENOUS; SUBCUTANEOUS at 05:12

## 2018-12-31 RX ADMIN — INSULIN ASPART 4 UNITS: 100 INJECTION, SOLUTION INTRAVENOUS; SUBCUTANEOUS at 12:12

## 2018-12-31 RX ADMIN — INSULIN ASPART 10 UNITS: 100 INJECTION, SOLUTION INTRAVENOUS; SUBCUTANEOUS at 12:12

## 2018-12-31 RX ADMIN — ENOXAPARIN SODIUM 40 MG: 100 INJECTION SUBCUTANEOUS at 05:12

## 2018-12-31 RX ADMIN — INSULIN ASPART 10 UNITS: 100 INJECTION, SOLUTION INTRAVENOUS; SUBCUTANEOUS at 08:12

## 2018-12-31 RX ADMIN — LISINOPRIL 40 MG: 20 TABLET ORAL at 08:12

## 2018-12-31 RX ADMIN — INSULIN ASPART 4 UNITS: 100 INJECTION, SOLUTION INTRAVENOUS; SUBCUTANEOUS at 08:12

## 2018-12-31 NOTE — PLAN OF CARE
Problem: Adult Inpatient Plan of Care  Goal: Plan of Care Review  Outcome: Ongoing (interventions implemented as appropriate)  Pt. AAOx4; calm and cooperative. No falls no injuries. V/S stable, afebrile. No complaints of pain discomfort. Pt. turned q2 hrs. Right foot dressing clean, dry, and intact. No redness, swelling and warmth to site noted. Schduled meds given without any difficulty. Call light in reach. Bed in lowest position; side rails x 2. Will continue monitor.     12/31/18 1056   Plan of Care Review   Plan of Care Reviewed With patient

## 2018-12-31 NOTE — PROGRESS NOTES
1058- TN contacted Murphy at [facility] to inform that patient will not require IVABX any longer however he is still inquiring of SNF placement for woundcare needs. Murphy will review and contact patient's insurance to verify. Awaiting callback.    1200- Murphy at [facility] submitted a request for authorization to patient's insurance. Awaiting feedback.

## 2018-12-31 NOTE — PROGRESS NOTES
Call from Angela at [facility]. They do not have contact with patient insurance. Additionally, the last time patient was at their facility, he declined to complete the long term care medicaid application to pay for care. He has a bill as result. Patient would need to pay the outstanding balance and complete the long term care application to be considered. At this time [facility] is not an option.

## 2018-12-31 NOTE — PROGRESS NOTES
[facility]  Podiatry  Progress Note    Patient Name: Khanh Galvan  MRN: [id number]  Admission Date: 12/23/2018  Hospital Length of Stay: 8 days  Attending Physician: Sonia Sharma MD  Primary Care Provider: JONY Pena     Subjective:     Interval History: Seen at bedside. No new pedal complaints. Pending placement.      Follow-up For: Procedure(s) (LRB):  INCISION AND DRAINAGE (Right)  AMPUTATION, TOE PARTIAL RIGHT GREAT TOE (Right)    Post-Operative Day:7  Days Post-Op    Scheduled Meds:   enoxaparin  40 mg Subcutaneous Daily    insulin aspart U-100  10 Units Subcutaneous TIDWM    insulin detemir U-100  30 Units Subcutaneous Daily    lisinopril  40 mg Oral Daily    sodium chloride 0.9%  10 mL Intravenous Q6H     Continuous Infusions:  PRN Meds:acetaminophen, dextrose 50%, glucagon (human recombinant), hydrALAZINE, influenza, insulin aspart U-100, ondansetron, pneumoc 13-dutch conj-dip cr(PF), Fl[place] PICC Protocol **AND** sodium chloride 0.9% **AND** sodium chloride 0.9%, traMADol    Review of Systems   Constitutional: Positive for fatigue. Negative for activity change, appetite change, chills and fever.   Respiratory: Negative for cough and shortness of breath.    Cardiovascular: Negative for chest pain and leg swelling.   Gastrointestinal: Negative for diarrhea, nausea and vomiting.   Musculoskeletal: Positive for gait problem and myalgias.   Skin: Positive for wound.   Neurological: Positive for weakness and numbness.        + paresthesia      Objective:     Vital Signs (Most Recent):  Temp: 98.5 °F (36.9 °C) (12/31/18 0732)  Pulse: 74 (12/31/18 0732)  Resp: 17 (12/31/18 0732)  BP: 128/71 (12/31/18 0732)  SpO2: 96 % (12/31/18 0732) Vital Signs (24h Range):  Temp:  [97.9 °F (36.6 °C)-99.6 °F (37.6 °C)] 98.5 °F (36.9 °C)  Pulse:  [68-81] 74  Resp:  [17-19] 17  SpO2:  [94 %-97 %] 96 %  BP: (122-150)/(59-95) 128/71     Weight: 120.8 kg (266 lb 6.4 oz)  Body mass index is 37.16  kg/m².    General: Pt. is well-developed, well-nourished, appears stated age, in no acute distress, alert and oriented x 3. No evidence of depression, anxiety, or agitation. Calm, cooperative, and communicative. Appropriate interactions and affect.    Surgical Site  Incision: Incision surgical foot well approximated, good skin apposition, sutures intact without gaps, drainage, pus, tracking, fluctuance, malodor, or signs of infection.   Signs of infection: local  erythema  Drainage: bloody  Periwound: Reddened with lateral blistering to toe    12/31/18:                   Left 4th toe with callus, stable no purulent drainage noted.               12/27 12/26/18                    Laboratory:  CBC:   Recent Labs   Lab 12/25/18  0400   WBC 8.29   RBC 4.13*   HGB 11.8*   HCT 35.8*      MCV 87   MCH 28.6   MCHC 33.0     CMP:   Recent Labs   Lab 12/25/18  0359  12/27/18  0545   *   < > 246*   CALCIUM 8.7   < > 9.2   ALBUMIN 2.6*  --   --    PROT 6.4  --   --       < > 137   K 3.7   < > 3.9   CO2 23   < > 25      < > 105   BUN 16   < > 14   CREATININE 0.9   < > 0.9   ALKPHOS 66  --   --    ALT 14  --   --    AST 18  --   --    BILITOT 0.4  --   --     < > = values in this interval not displayed.     CRP:   No results for input(s): CRP in the last 168 hours.  ESR:   No results for input(s): SEDRATE in the last 168 hours.  Microbiology Results (last 7 days)     Procedure Component Value Units Date/Time    Aerobic culture [[id number]]  (Susceptibility) Collected:  12/24/18 2349    Order Status:  Completed Specimen:  Bone from Toe, Right Foot Updated:  12/29/18 1218     Aerobic Bacterial Culture Results called to and read back by:Pili Laird-Jesus estrada     Aerobic Bacterial Culture --     KLEBSIELLA PNEUMONIAE ESBL  Moderate       Aerobic Bacterial Culture --     STREPTOCOCCUS AGALACTIAE (GROUP B)  Many  Beta-hemolytic streptococci are routinely susceptible to   penicillins,cephalosporins  and carbapenems.       Aerobic Bacterial Culture --     HAFNIA ALVEI  Few      Blood culture x two cultures. Draw prior to antibiotics. [[id number]] Collected:  12/23/18 1912    Order Status:  Completed Specimen:  Blood from Peripheral, Hand, Right Updated:  12/28/18 2103     Blood Culture, Routine No growth after 5 days.    Narrative:       Aerobic and anaerobic    Blood culture x two cultures. Draw prior to antibiotics. [[id number]] Collected:  12/23/18 1850    Order Status:  Completed Specimen:  Blood from Peripheral, Hand, Left Updated:  12/28/18 2103     Blood Culture, Routine No growth after 5 days.    Narrative:       Aerobic and anaerobic    Culture, Anaerobe [[id number]] Collected:  12/24/18 1458    Order Status:  Completed Specimen:  Bone from Toe, Right Foot Updated:  12/27/18 0824     Anaerobic Culture No anaerobes isolated    AFB Culture & Smear [[id number]] Collected:  12/24/18 1458    Order Status:  Completed Specimen:  Bone from Toe, Right Foot Updated:  12/26/18 2127     AFB Culture & Smear Culture in progress     AFB CULTURE STAIN No acid fast bacilli seen.    Gram stain [[id number]] Collected:  12/24/18 1458    Order Status:  Completed Specimen:  Bone from Toe, Right Foot Updated:  12/25/18 0823     Gram Stain Result Moderate Gram positive cocci in pairs      Few WBC's        Specimen (12h ago, onward)    None          Diagnostic Results:  Imaging Results          X-Ray Foot Complete Right (Final result)  Result time 12/23/18 19:53:33    Final result by Rain Delgado MD (12/23/18 19:53:33)                 Impression:      See above.      Electronically signed by: Rain Delgado MD  Date:    12/23/2018  Time:    19:53             Narrative:    EXAMINATION:  XR FOOT COMPLETE 3 VIEW RIGHT    CLINICAL HISTORY:  . Osteomyelitis, unspecified    TECHNIQUE:  AP, lateral, and oblique views of the right foot were performed.    COMPARISON:  None.    FINDINGS:  No evidence of acute fracture or dislocation.   Small ulceration with suspected small amount of soft tissue air seen at the distal aspect of the great toe. No definite osseous erosive or destructive changes to suggest osteomyelitis.  Future MRI follow-up would be more sensitive for detection if clinical concern exists.                               X-Ray Chest AP Portable (Final result)  Result time 12/23/18 19:50:32    Final result by Rain Delgado MD (12/23/18 19:50:32)                 Impression:      Cardiomegaly with suspected mild pulmonary edema.      Electronically signed by: Rain Delgado MD  Date:    12/23/2018  Time:    19:50             Narrative:    EXAMINATION:  XR CHEST AP PORTABLE    CLINICAL HISTORY:  Sepsis;    TECHNIQUE:  Single frontal view of the chest was performed.    COMPARISON:  03/02/2018.    FINDINGS:  Heart is enlarged but stable in size.  There is prominence of central pulmonary vasculature with mild perihilar opacity and diffuse increased interstitial attenuation most suggestive for mild pulmonary edema.  No evidence of focal consolidation, pneumothorax, or large effusion.  No acute osseous abnormality identified.                                  Assessment/Plan:     Active Diagnoses:    Diagnosis Date Noted POA    PRINCIPAL PROBLEM:  Subacute osteomyelitis of right foot [M86.271] 02/26/2018 Yes    Morbid obesity due to excess calories [E66.01] 12/24/2018 Yes    Sepsis [A41.9] 12/24/2018 Yes    Diabetic ulcer of right great toe [E11.621, L97.519]  Yes    Acute osteomyelitis of toe, right [M86.171]  Yes    Elevated lactic acid level [R79.89] 12/23/2018 Yes    Cellulitis of right foot [L03.115] 02/24/2018 Yes    Type 2 diabetes mellitus with right diabetic foot infection [E11.628, L08.9] 02/24/2018 Yes      Problems Resolved During this Admission:       S/p 7 days right partial hallux amputation.Painted with betadine covered with aquacel Ag, kris foam wrapped with cast padding, coban. If still inpatient plan for bandage  change on  1/3/19.    Left 4th toe painted with betadine left open to air dry.     Final abx per ID.      Bandage can stay intact for 5-7 days until clinic visit.     Partial heel WB right foot with CAM boot.     Ok for discharge from podiatry standpoint.     Podiatry will follow.     Jeanne La DPM  Podiatry  [facility]

## 2018-12-31 NOTE — PROGRESS NOTES
Discharge planning--SHILPI provided update to JORGE A Velez this am. SHILPI contacted Randy ENRIQUE to alert that patient did not discharge yesterday and hold the referral until team has opportunity to review and update plan.

## 2018-12-31 NOTE — SUBJECTIVE & OBJECTIVE
Interval History: pt has no new complaints     Review of Systems   Constitutional: Negative.    Respiratory: Negative.    Cardiovascular: Negative.    Gastrointestinal: Negative.      Objective:     Vital Signs (Most Recent):  Temp: 98.3 °F (36.8 °C) (12/31/18 1145)  Pulse: 81 (12/31/18 1145)  Resp: 17 (12/31/18 1145)  BP: 108/65 (12/31/18 1145)  SpO2: 95 % (12/31/18 1145) Vital Signs (24h Range):  Temp:  [97.9 °F (36.6 °C)-99.6 °F (37.6 °C)] 98.3 °F (36.8 °C)  Pulse:  [68-81] 81  Resp:  [17-19] 17  SpO2:  [94 %-97 %] 95 %  BP: (108-150)/(65-95) 108/65     Weight: 120.8 kg (266 lb 6.4 oz)  Body mass index is 37.16 kg/m².    Intake/Output Summary (Last 24 hours) at 12/31/2018 1409  Last data filed at 12/31/2018 1215  Gross per 24 hour   Intake 360 ml   Output 800 ml   Net -440 ml      Physical Exam   Constitutional: He is oriented to person, place, and time. He appears well-developed. No distress.   Cardiovascular: Normal rate, regular rhythm, normal heart sounds and intact distal pulses.   Pulmonary/Chest: Effort normal and breath sounds normal.   Abdominal: Soft. Bowel sounds are normal.   Neurological: He is alert and oriented to person, place, and time.   Skin: He is not diaphoretic.   Right foot wrapped   Nursing note and vitals reviewed.      Significant Labs:   Pathology Results  (Last 10 years)    None          Significant Imaging: I have reviewed and interpreted all pertinent imaging results/findings within the past 24 hours.

## 2018-12-31 NOTE — PLAN OF CARE
Problem: Adult Inpatient Plan of Care  Goal: Plan of Care Review  Outcome: Ongoing (interventions implemented as appropriate)  Pt free of accidental injury during shift. No s/s of distress noted. Denies pain at present. Able to make needs known. Dressing to right foot CDI. Currently awaiting placement. Safety measures maintained. Will continue to monitor

## 2018-12-31 NOTE — ASSESSMENT & PLAN NOTE
Poorly controlled  Adjusting insulin regimen for goal BG < 180    Increase basal and prandial doses

## 2018-12-31 NOTE — PROGRESS NOTES
[facility] Hospital Medicine  Progress Note    Patient Name: Khanh Galvan  MRN: [id number]  Patient Class: IP- Inpatient   Admission Date: 12/23/2018  Length of Stay: 8 days  Attending Physician: Sonia Sharma MD  Primary Care Provider: JONY Pena        Subjective:     Principal Problem:Subacute osteomyelitis of right foot    HPI:    Khanh Galvan is a 58 y.o. male that (in part)  has a past medical history of Diabetes mellitus, Hypertension, and Subacute osteomyelitis.  has a past surgical history that includes left foot. Presents to [facility] Emergency [facility] by EMS who was called for fatigue.  When EMS arrived the patient was lethargic and nearly attended.  He was found to be hyperglycemic and hypotensive.  IV fluids were initiated and mental status improved along with his blood pressure.  He was found to have a right-sided diabetic ft lesion.  He reports sustaining injury from stubbing his right great toe on the stairs.  He developed a painful, red, swollen, and warm lesion to his right great toe that has worsened over the last 3 days.  Unfortunately the patient is a poor historian and history is limited.      In the emergency department routine laboratory studies were obtained and x-ray the right foot was performed.  He had elevated ESR, CRP, and physical exam was consistent with diabetic foot lesion with osteomyelitis.  He was given IV fluids and started on empiric antibiotic therapy due to associated cellulitis and concern for possible bacteremia with systemic infection causing infectious encephalopathy.    Hospital medicine has been asked to admit for further evaluation and treatment.     Hospital Course:  Mr Galvan presented with subacute osteomyelitis of right big toe. Podiatry consulted. Underwent partial amputation of right toe. Biopsy of amputated bone showed moderate Klebsiella (ESBL), many Strep Group B and few Hafnia alvei. ID  "consulted. Abx tailored to ertapenem. Per son, patient lives with him, his wife and a baby who is a heart patient and were concerned about baby being exposed to bacteria. SNF was planned. Biopsy of proximal bone resulted as "Viable bone exhibiting no inflammatory infiltrates". Discussed with ID who recommended no further abx treatment, just wound care. Patient can go home with HH. Has a walker at home. Family requested another day as they live very far and need to plan ahead to pick him up. Patient is in agreement with [facility] if needed. Will discuss with SW.     Await [facility] placement     Interval History: pt has no new complaints     Review of Systems   Constitutional: Negative.    Respiratory: Negative.    Cardiovascular: Negative.    Gastrointestinal: Negative.      Objective:     Vital Signs (Most Recent):  Temp: 98.3 °F (36.8 °C) (12/31/18 1145)  Pulse: 81 (12/31/18 1145)  Resp: 17 (12/31/18 1145)  BP: 108/65 (12/31/18 1145)  SpO2: 95 % (12/31/18 1145) Vital Signs (24h Range):  Temp:  [97.9 °F (36.6 °C)-99.6 °F (37.6 °C)] 98.3 °F (36.8 °C)  Pulse:  [68-81] 81  Resp:  [17-19] 17  SpO2:  [94 %-97 %] 95 %  BP: (108-150)/(65-95) 108/65     Weight: 120.8 kg (266 lb 6.4 oz)  Body mass index is 37.16 kg/m².    Intake/Output Summary (Last 24 hours) at 12/31/2018 1409  Last data filed at 12/31/2018 1215  Gross per 24 hour   Intake 360 ml   Output 800 ml   Net -440 ml      Physical Exam   Constitutional: He is oriented to person, place, and time. He appears well-developed. No distress.   Cardiovascular: Normal rate, regular rhythm, normal heart sounds and intact distal pulses.   Pulmonary/Chest: Effort normal and breath sounds normal.   Abdominal: Soft. Bowel sounds are normal.   Neurological: He is alert and oriented to person, place, and time.   Skin: He is not diaphoretic.   Right foot wrapped   Nursing note and vitals reviewed.      Significant Labs:   Pathology Results  (Last 10 years)    None    " "      Significant Imaging: I have reviewed and interpreted all pertinent imaging results/findings within the past 24 hours.    Assessment/Plan:      * Subacute osteomyelitis of right foot    POD 6 partial amputation right toe  Is clinically stable  BG close to goal. Adjusting insulin as needed for goal BG <180  BCx NGTD. Bone Cx with moderate Klebsiella ESBL, many Strep group B and Hafnia alvei   Pathology of proximal bone showed "Viable bone exhibiting no inflammatory infiltrates"  Discussed with ID who recommends no further abx treatment, just wound care  Wound care per Podiatry         Acute osteomyelitis of toe, right    S/p I&D with partial amputation of right toe  Path report:  Part 1  Distal great toe (submitted as right distal great toe):  -Cutaneous ulceration and necrosis with intense acute inflammation extending into superficial  subcutaneous tissues  -Dermal fibrosis and chronic perivascular inflammation  -Ulceration is focally present at the cutaneous resection margin. Soft tissues and bone at margins appear  viable  Part 2  Segment of tubular bone (submitted as proximal margin right great toe):  -Viable bone exhibiting no inflammatory infiltrates  -Attached viable fibroadipose tissues without significant histopathologic abnormalities    No IV indicated   Continue wound care      Diabetic ulcer of right great toe           Sepsis           Morbid obesity due to excess calories    Spent > 5 minutes on weight loss education       Elevated lactic acid level    2/2 sepsis  Peripheral pulses normal with adequate resuscitation  Good appetite and PO intake       Type 2 diabetes mellitus with right diabetic foot infection    Poorly controlled  Adjusting insulin regimen for goal BG < 180    Increase basal and prandial doses       Cellulitis of right foot    With evidence of osteo  Management as above         VTE Risk Mitigation (From admission, onward)        Ordered     enoxaparin injection 40 mg  Daily      " 12/25/18 1528     IP VTE HIGH RISK PATIENT  Once      12/24/18 1712     Place HUGH hose  Until discontinued      12/23/18 2159     Place sequential compression device  Until discontinued      12/23/18 2159              Sonia Sharma MD  Department of Hospital Medicine   [facility]

## 2018-12-31 NOTE — PLAN OF CARE
Problem: Physical Therapy Goal  Goal: Physical Therapy Goal  Goals to be met by: 1/3/19    Patient will increase functional independence with mobility by performin. Supine to sit with supervision  2. Sit to stand transfer with supervision  3. Gait x100 feet with supervision using Rolling Walker  4. Lower extremity exercise program x30 reps per handout, with supervision     Outcome: Ongoing (interventions implemented as appropriate)  Pt ambulated ~ 50 ft with RW, CGA ( heel touch WB in CAM boot RLE ). V/T cues for safety, proper technique and walker management. Pt will benefit from further therapy in order to get back to PLOF.

## 2018-12-31 NOTE — PLAN OF CARE
TN met with patient to inform that [facility] is not in network with his insurance and that he would also have to pay his outstanding balance before consideration. TN also informed patient that SNF is still being persued and that authorization was requested by Radha Keyes. Patient voiced understanding.       12/31/18 3266   Discharge Reassessment   Assessment Type Discharge Planning Reassessment   Provided patient/caregiver education on the expected discharge date and the discharge plan Yes   Do you have any problems affording any of your prescribed medications? No   Discharge Plan A Skilled Nursing Facility   Discharge Plan B Skilled Nursing Facility   Anticipated Discharge Disposition SNF   Can the patient answer the patient profile reliably? Yes, cognitively intact   How does the patient rate their overall health at the present time? Good   Describe the patient's ability to walk at the present time. Minor restrictions or changes   How often would a person be available to care for the patient? Often   Number of comorbid conditions (as recorded on the chart) Two   Post-Acute Status   Post-Acute Authorization Placement   Post-Acute Placement Status Pending Payor Review

## 2018-12-31 NOTE — ASSESSMENT & PLAN NOTE
S/p I&D with partial amputation of right toe  Path report:  Part 1  Distal great toe (submitted as right distal great toe):  -Cutaneous ulceration and necrosis with intense acute inflammation extending into superficial  subcutaneous tissues  -Dermal fibrosis and chronic perivascular inflammation  -Ulceration is focally present at the cutaneous resection margin. Soft tissues and bone at margins appear  viable  Part 2  Segment of tubular bone (submitted as proximal margin right great toe):  -Viable bone exhibiting no inflammatory infiltrates  -Attached viable fibroadipose tissues without significant histopathologic abnormalities    No IV indicated   Continue wound care

## 2019-01-01 LAB
POCT GLUCOSE: 152 MG/DL (ref 70–110)
POCT GLUCOSE: 171 MG/DL (ref 70–110)
POCT GLUCOSE: 184 MG/DL (ref 70–110)
POCT GLUCOSE: 198 MG/DL (ref 70–110)

## 2019-01-01 PROCEDURE — 63600175 PHARM REV CODE 636 W HCPCS: Performed by: INTERNAL MEDICINE

## 2019-01-01 PROCEDURE — 25000003 PHARM REV CODE 250: Performed by: INTERNAL MEDICINE

## 2019-01-01 PROCEDURE — 11000001 HC ACUTE MED/SURG PRIVATE ROOM

## 2019-01-01 RX ADMIN — INSULIN ASPART 12 UNITS: 100 INJECTION, SOLUTION INTRAVENOUS; SUBCUTANEOUS at 08:01

## 2019-01-01 RX ADMIN — INSULIN ASPART 2 UNITS: 100 INJECTION, SOLUTION INTRAVENOUS; SUBCUTANEOUS at 08:01

## 2019-01-01 RX ADMIN — INSULIN DETEMIR 35 UNITS: 100 INJECTION, SOLUTION SUBCUTANEOUS at 08:01

## 2019-01-01 RX ADMIN — ENOXAPARIN SODIUM 40 MG: 100 INJECTION SUBCUTANEOUS at 05:01

## 2019-01-01 RX ADMIN — INSULIN ASPART 12 UNITS: 100 INJECTION, SOLUTION INTRAVENOUS; SUBCUTANEOUS at 12:01

## 2019-01-01 RX ADMIN — LISINOPRIL 40 MG: 20 TABLET ORAL at 08:01

## 2019-01-01 RX ADMIN — INSULIN ASPART 12 UNITS: 100 INJECTION, SOLUTION INTRAVENOUS; SUBCUTANEOUS at 05:01

## 2019-01-01 RX ADMIN — INSULIN ASPART 2 UNITS: 100 INJECTION, SOLUTION INTRAVENOUS; SUBCUTANEOUS at 05:01

## 2019-01-01 RX ADMIN — INSULIN ASPART 1 UNITS: 100 INJECTION, SOLUTION INTRAVENOUS; SUBCUTANEOUS at 09:01

## 2019-01-01 RX ADMIN — INSULIN ASPART 2 UNITS: 100 INJECTION, SOLUTION INTRAVENOUS; SUBCUTANEOUS at 12:01

## 2019-01-01 NOTE — PLAN OF CARE
Problem: Adult Inpatient Plan of Care  Goal: Plan of Care Review  Outcome: Ongoing (interventions implemented as appropriate)  Pt free of accidental injury during shift. No s/s of distress noted. Denies pain at present. Dressing to right foot CDI. Able to make needs known. Insulin administered as scheduled. Call bell within reach. Safety measures maintained. Call bell within reach. Will continue to monitor

## 2019-01-01 NOTE — PLAN OF CARE
Problem: Adult Inpatient Plan of Care  Goal: Plan of Care Review  Outcome: Ongoing (interventions implemented as appropriate)  Patient AAOx4, remains free from fall or injury. BCG monitored and sceduled insulin administered. No c/o pain throughout shift. Dressing to R foot clean, dry and intact with boot in place. Bed in low locked position, bed alarm armed and audible. Will continue to monitor.

## 2019-01-02 LAB
POCT GLUCOSE: 128 MG/DL (ref 70–110)
POCT GLUCOSE: 164 MG/DL (ref 70–110)
POCT GLUCOSE: 165 MG/DL (ref 70–110)
POCT GLUCOSE: 199 MG/DL (ref 70–110)
POCT GLUCOSE: 226 MG/DL (ref 70–110)

## 2019-01-02 PROCEDURE — 11000001 HC ACUTE MED/SURG PRIVATE ROOM

## 2019-01-02 PROCEDURE — 25000003 PHARM REV CODE 250: Performed by: INTERNAL MEDICINE

## 2019-01-02 PROCEDURE — 97530 THERAPEUTIC ACTIVITIES: CPT

## 2019-01-02 PROCEDURE — 63600175 PHARM REV CODE 636 W HCPCS: Performed by: INTERNAL MEDICINE

## 2019-01-02 PROCEDURE — 97116 GAIT TRAINING THERAPY: CPT

## 2019-01-02 RX ADMIN — INSULIN ASPART 2 UNITS: 100 INJECTION, SOLUTION INTRAVENOUS; SUBCUTANEOUS at 05:01

## 2019-01-02 RX ADMIN — INSULIN ASPART 12 UNITS: 100 INJECTION, SOLUTION INTRAVENOUS; SUBCUTANEOUS at 12:01

## 2019-01-02 RX ADMIN — LISINOPRIL 40 MG: 20 TABLET ORAL at 08:01

## 2019-01-02 RX ADMIN — INSULIN DETEMIR 35 UNITS: 100 INJECTION, SOLUTION SUBCUTANEOUS at 08:01

## 2019-01-02 RX ADMIN — ENOXAPARIN SODIUM 40 MG: 100 INJECTION SUBCUTANEOUS at 05:01

## 2019-01-02 RX ADMIN — INSULIN ASPART 12 UNITS: 100 INJECTION, SOLUTION INTRAVENOUS; SUBCUTANEOUS at 05:01

## 2019-01-02 RX ADMIN — INSULIN ASPART 4 UNITS: 100 INJECTION, SOLUTION INTRAVENOUS; SUBCUTANEOUS at 08:01

## 2019-01-02 RX ADMIN — INSULIN ASPART 12 UNITS: 100 INJECTION, SOLUTION INTRAVENOUS; SUBCUTANEOUS at 08:01

## 2019-01-02 NOTE — PLAN OF CARE
Problem: Adult Inpatient Plan of Care  Goal: Plan of Care Review  Outcome: Ongoing (interventions implemented as appropriate)   01/02/19 2909   Plan of Care Review   Plan of Care Reviewed With patient   Patient remains free from fall or injury. VSS. PRN insulin administered as ordered. Dressing to right foot CDI. Safety maintained, will continue to monitor.

## 2019-01-02 NOTE — PROGRESS NOTES
"[facility]  Adult Nutrition  Progress Note    SUMMARY       Recommendations    1. Encourage continued adequate meal intake daily     2. Encourage diet compliance   Goals: Maintain meal intake >/=75% daily  Nutrition Goal Status: new  Communication of RD Recs: (plan of care review)    Reason for Assessment    Reason For Assessment: length of stay  Diagnosis: infection/sepsis(R)foot osteomyelitis)  Relevant Medical History: DM, HTN  General Information Comments: Pt seen this a.m. & w/ no c/o diet intolerance. Reports a good appetite & stable wt pta. Pt states he is familiar w/ DM diet guidelines. POD# 7 s/p partial amputation of R)big toe. NH placement pending. NFPE performed today; no s/s of malnutrition present.   Nutrition Discharge Planning: Adequate intake of meals to meet nutr needs    Nutrition Risk Screen    Nutrition Risk Screen: no indicators present    Nutrition/Diet History    Spiritual, Cultural Beliefs, Synagogue Practices, Values that Affect Care: no  Food Allergies: NKFA  Factors Affecting Nutritional Intake: None identified at this time    Anthropometrics    Temp: 98.4 °F (36.9 °C)  Height Method: Stated  Height: 5' 11" (180.3 cm)  Height (inches): 71 in  Weight Method: Bed Scale  Weight: 120.8 kg (266 lb 6.4 oz)  Weight (lb): 266.4 lb  Ideal Body Weight (IBW), Male: 172 lb  % Ideal Body Weight, Male (lb): 154.88 lb  BMI (Calculated): 37.2  BMI Grade: 35 - 39.9 - obesity - grade II       Lab/Procedures/Meds    Pertinent Labs Reviewed: reviewed  Pertinent Labs Comments: A1C 8.6, POCT glu 152-226  Pertinent Medications Reviewed: reviewed  Pertinent Medications Comments: insulin    Estimated/Assessed Needs    Weight Used For Calorie Calculations: 120.8 kg (266 lb 5.1 oz)  Energy Calorie Requirements (kcal): 2045  Energy Need Method: [place]     Protein Requirements: 97 g (.8 gms/kg)  Weight Used For Protein Calculations: 120.8 kg (266 lb 5.1 oz)     Estimated Fluid Requirement " Method: RDA Method  RDA Method (mL): 2045  CHO Requirement: 255 g/day      Nutrition Prescription Ordered    Current Diet Order: 2000 calorie diabetic    Evaluation of Received Nutrient/Fluid Intake    Energy Calories Required: meeting needs  Protein Required: meeting needs  Fluid Required: meeting needs  Comments: LBM 1/2  Tolerance: tolerating  % Intake of Estimated Energy Needs: 75 - 100 %  % Meal Intake: 75 - 100 %    Nutrition Risk    Level of Risk/Frequency of Follow-up: (F/u 1 x weekly)     Assessment and Plan    No nutrition dx @ this time.     Monitor and Evaluation    Food and Nutrient Intake: food and beverage intake, energy intake  Food and Nutrient Adminstration: diet order  Knowledge/Beliefs/Attitudes: food and nutrition knowledge/skill, beliefs and attitudes  Physical Activity and Function: nutrition-related ADLs and IADLs  Anthropometric Measurements: weight change, weight  Biochemical Data, Medical Tests and Procedures: electrolyte and renal panel, glucose/endocrine profile  Nutrition-Focused Physical Findings: overall appearance     Nutrition Follow-Up    RD Follow-up?: Yes

## 2019-01-02 NOTE — PROGRESS NOTES
[facility] Hospital Medicine  Progress Note    Patient Name: Khanh Galvan  MRN: [id number]  Patient Class: IP- Inpatient   Admission Date: 12/23/2018  Length of Stay: 9 days  Attending Physician: Sonia Sharma MD  Primary Care Provider: JONY Pena        Subjective:     Principal Problem:Subacute osteomyelitis of right foot    HPI:    Khanh Galvan is a 58 y.o. male that (in part)  has a past medical history of Diabetes mellitus, Hypertension, and Subacute osteomyelitis.  has a past surgical history that includes left foot. Presents to [facility] Emergency Department by EMS who was called for fatigue.  When EMS arrived the patient was lethargic and nearly attended.  He was found to be hyperglycemic and hypotensive.  IV fluids were initiated and mental status improved along with his blood pressure.  He was found to have a right-sided diabetic ft lesion.  He reports sustaining injury from stubbing his right great toe on the stairs.  He developed a painful, red, swollen, and warm lesion to his right great toe that has worsened over the last 3 days.  Unfortunately the patient is a poor historian and history is limited.      In the emergency department routine laboratory studies were obtained and x-ray the right foot was performed.  He had elevated ESR, CRP, and physical exam was consistent with diabetic foot lesion with osteomyelitis.  He was given IV fluids and started on empiric antibiotic therapy due to associated cellulitis and concern for possible bacteremia with systemic infection causing infectious encephalopathy.    Hospital medicine has been asked to admit for further evaluation and treatment.     Hospital Course:  Mr Galvan presented with subacute osteomyelitis of right big toe. Podiatry consulted. Underwent partial amputation of right toe. Biopsy of amputated bone showed moderate Klebsiella (ESBL), many Strep Group B and few Hafnia alvei. ID  "consulted. Abx tailored to ertapenem. Per son, patient lives with him, his wife and a baby who is a heart patient and were concerned about baby being exposed to bacteria. SNF was planned. Biopsy of proximal bone resulted as "Viable bone exhibiting no inflammatory infiltrates". Discussed with ID who recommended no further abx treatment, just wound care. Patient can go home with HH. Has a walker at home. Family requested another day as they live very far and need to plan ahead to pick him up. Patient is in agreement with long-term NH if needed. Will discuss with SW.     Await long-term NH placement     Interval History: no new complaints     Review of Systems   Constitutional: Negative.    Respiratory: Negative.    Cardiovascular: Negative.    Gastrointestinal: Negative.      Objective:     Vital Signs (Most Recent):  Temp: 98.5 °F (36.9 °C) (01/01/19 1702)  Pulse: 73 (01/01/19 1702)  Resp: 20 (01/01/19 1702)  BP: (!) 159/88 (01/01/19 1702)  SpO2: 97 % (01/01/19 1702) Vital Signs (24h Range):  Temp:  [98 °F (36.7 °C)-99.1 °F (37.3 °C)] 98.5 °F (36.9 °C)  Pulse:  [66-79] 73  Resp:  [18-20] 20  SpO2:  [95 %-98 %] 97 %  BP: (121-159)/(70-88) 159/88     Weight: 120.8 kg (266 lb 6.4 oz)  Body mass index is 37.16 kg/m².    Intake/Output Summary (Last 24 hours) at 1/1/2019 1807  Last data filed at 1/1/2019 0100  Gross per 24 hour   Intake --   Output 200 ml   Net -200 ml      Physical Exam   Constitutional: He is oriented to person, place, and time. He appears well-developed. No distress.   Cardiovascular: Normal rate, regular rhythm, normal heart sounds and intact distal pulses.   Pulmonary/Chest: Effort normal and breath sounds normal.   Abdominal: Soft. Bowel sounds are normal.   Neurological: He is alert and oriented to person, place, and time.   Skin: He is not diaphoretic.   Right foot wrapped   Nursing note and vitals reviewed.      Significant Labs: All pertinent labs within the past 24 hours have been " "reviewed.    Significant Imaging: I have reviewed and interpreted all pertinent imaging results/findings within the past 24 hours.    Assessment/Plan:      * Subacute osteomyelitis of right foot    POD 6 partial amputation right toe  Is clinically stable  BG close to goal. Adjusting insulin as needed for goal BG <180  BCx NGTD. Bone Cx with moderate Klebsiella ESBL, many Strep group B and Hafnia alvei   Pathology of proximal bone showed "Viable bone exhibiting no inflammatory infiltrates"  Discussed with ID who recommends no further abx treatment, just wound care  Wound care per Podiatry         Acute osteomyelitis of toe, right    S/p I&D with partial amputation of right toe  Path report:  Part 1  Distal great toe (submitted as right distal great toe):  -Cutaneous ulceration and necrosis with intense acute inflammation extending into superficial  subcutaneous tissues  -Dermal fibrosis and chronic perivascular inflammation  -Ulceration is focally present at the cutaneous resection margin. Soft tissues and bone at margins appear  viable  Part 2  Segment of tubular bone (submitted as proximal margin right great toe):  -Viable bone exhibiting no inflammatory infiltrates  -Attached viable fibroadipose tissues without significant histopathologic abnormalities    No IV indicated   Continue wound care      Diabetic ulcer of right great toe           Sepsis           Morbid obesity due to excess calories    Spent > 5 minutes on weight loss education       Elevated lactic acid level    2/2 sepsis  Peripheral pulses normal with adequate resuscitation  Good appetite and PO intake       Type 2 diabetes mellitus with right diabetic foot infection    Poorly controlled  Adjusting insulin regimen for goal BG < 180    Increase basal and prandial doses       Cellulitis of right foot    With evidence of osteo  Management as above         VTE Risk Mitigation (From admission, onward)        Ordered     enoxaparin injection 40 mg  Daily  "     12/25/18 1528     IP VTE HIGH RISK PATIENT  Once      12/24/18 1712     Place HUGH hose  Until discontinued      12/23/18 2159     Place sequential compression device  Until discontinued      12/23/18 2159              Sonia Sharma MD  Department of Hospital Medicine   [facility]

## 2019-01-02 NOTE — PLAN OF CARE
Problem: Adult Inpatient Plan of Care  Goal: Plan of Care Review  Recommendations     1. Encourage continued adequate meal intake daily     2. Encourage diet compliance   Goals: Maintain meal intake >/=75% daily  Nutrition Goal Status: new

## 2019-01-02 NOTE — PROGRESS NOTES
SHILPI contacted [facility] to follow-up on SNF authorization, SHILPI spoke to Sue in the business office that confirmed Murphy (admissions coordinator) will return back to work on tomorrow, therefore she will work on getting SNF authorization from insurance company.

## 2019-01-02 NOTE — SUBJECTIVE & OBJECTIVE
Interval History: no new complaints     Review of Systems   Constitutional: Negative.    Respiratory: Negative.    Cardiovascular: Negative.    Gastrointestinal: Negative.      Objective:     Vital Signs (Most Recent):  Temp: 98.5 °F (36.9 °C) (01/01/19 1702)  Pulse: 73 (01/01/19 1702)  Resp: 20 (01/01/19 1702)  BP: (!) 159/88 (01/01/19 1702)  SpO2: 97 % (01/01/19 1702) Vital Signs (24h Range):  Temp:  [98 °F (36.7 °C)-99.1 °F (37.3 °C)] 98.5 °F (36.9 °C)  Pulse:  [66-79] 73  Resp:  [18-20] 20  SpO2:  [95 %-98 %] 97 %  BP: (121-159)/(70-88) 159/88     Weight: 120.8 kg (266 lb 6.4 oz)  Body mass index is 37.16 kg/m².    Intake/Output Summary (Last 24 hours) at 1/1/2019 1807  Last data filed at 1/1/2019 0100  Gross per 24 hour   Intake --   Output 200 ml   Net -200 ml      Physical Exam   Constitutional: He is oriented to person, place, and time. He appears well-developed. No distress.   Cardiovascular: Normal rate, regular rhythm, normal heart sounds and intact distal pulses.   Pulmonary/Chest: Effort normal and breath sounds normal.   Abdominal: Soft. Bowel sounds are normal.   Neurological: He is alert and oriented to person, place, and time.   Skin: He is not diaphoretic.   Right foot wrapped   Nursing note and vitals reviewed.      Significant Labs: All pertinent labs within the past 24 hours have been reviewed.    Significant Imaging: I have reviewed and interpreted all pertinent imaging results/findings within the past 24 hours.

## 2019-01-02 NOTE — UM SECONDARY REVIEW
[facility]    IP Extended Stay > 10    LOS: approved an agreement with D/C plan   10 day los snf/icf nhp in progress meets  committee extnede stay guideline

## 2019-01-03 LAB
POCT GLUCOSE: 170 MG/DL (ref 70–110)
POCT GLUCOSE: 205 MG/DL (ref 70–110)
POCT GLUCOSE: 211 MG/DL (ref 70–110)
POCT GLUCOSE: 246 MG/DL (ref 70–110)

## 2019-01-03 PROCEDURE — 63600175 PHARM REV CODE 636 W HCPCS: Performed by: HOSPITALIST

## 2019-01-03 PROCEDURE — 11000001 HC ACUTE MED/SURG PRIVATE ROOM

## 2019-01-03 PROCEDURE — 97116 GAIT TRAINING THERAPY: CPT

## 2019-01-03 PROCEDURE — 99024 POSTOP FOLLOW-UP VISIT: CPT | Mod: ,,, | Performed by: PODIATRIST

## 2019-01-03 PROCEDURE — 63600175 PHARM REV CODE 636 W HCPCS: Performed by: INTERNAL MEDICINE

## 2019-01-03 PROCEDURE — 99024 PR POST-OP FOLLOW-UP VISIT: ICD-10-PCS | Mod: ,,, | Performed by: PODIATRIST

## 2019-01-03 PROCEDURE — 25000003 PHARM REV CODE 250: Performed by: INTERNAL MEDICINE

## 2019-01-03 PROCEDURE — S5571 INSULIN DISPOS PEN 3 ML: HCPCS | Performed by: HOSPITALIST

## 2019-01-03 RX ADMIN — LISINOPRIL 40 MG: 20 TABLET ORAL at 08:01

## 2019-01-03 RX ADMIN — INSULIN ASPART 2 UNITS: 100 INJECTION, SOLUTION INTRAVENOUS; SUBCUTANEOUS at 09:01

## 2019-01-03 RX ADMIN — INSULIN ASPART 12 UNITS: 100 INJECTION, SOLUTION INTRAVENOUS; SUBCUTANEOUS at 08:01

## 2019-01-03 RX ADMIN — INSULIN ASPART 2 UNITS: 100 INJECTION, SOLUTION INTRAVENOUS; SUBCUTANEOUS at 08:01

## 2019-01-03 RX ADMIN — INSULIN ASPART 4 UNITS: 100 INJECTION, SOLUTION INTRAVENOUS; SUBCUTANEOUS at 12:01

## 2019-01-03 RX ADMIN — ENOXAPARIN SODIUM 40 MG: 100 INJECTION SUBCUTANEOUS at 05:01

## 2019-01-03 RX ADMIN — INSULIN ASPART 4 UNITS: 100 INJECTION, SOLUTION INTRAVENOUS; SUBCUTANEOUS at 05:01

## 2019-01-03 RX ADMIN — INSULIN ASPART 12 UNITS: 100 INJECTION, SOLUTION INTRAVENOUS; SUBCUTANEOUS at 12:01

## 2019-01-03 RX ADMIN — INSULIN ASPART 12 UNITS: 100 INJECTION, SOLUTION INTRAVENOUS; SUBCUTANEOUS at 05:01

## 2019-01-03 RX ADMIN — INSULIN DETEMIR 35 UNITS: 100 INJECTION, SOLUTION SUBCUTANEOUS at 08:01

## 2019-01-03 NOTE — PT/OT/SLP PROGRESS
"Physical Therapy Treatment    Patient Name:  Khanh Galvan   MRN:  [id number]    Recommendations:     Discharge Recommendations:  (home with home health and family assistance)   Discharge Equipment Recommendations: none   Barriers to discharge: Decreased caregiver support    Assessment:     Khanh Galvan is a 58 y.o. male admitted with a medical diagnosis of Subacute osteomyelitis of right foot.  He presents with the following impairments/functional limitations:  weakness, impaired endurance, impaired self care skills, gait instability, impaired balance, decreased lower extremity function, decreased safety awareness, decreased ROM, edema, orthopedic precautions, impaired skin, impaired functional mobilty .    Rehab Prognosis: Good; patient would benefit from acute skilled PT services to address these deficits and reach maximum level of function.    Recent Surgery: Procedure(s) (LRB):  INCISION AND DRAINAGE (Right)  AMPUTATION, TOE PARTIAL RIGHT GREAT TOE (Right) 9 Days Post-Op    Plan:     During this hospitalization, patient to be seen 6 x/week to address the identified rehab impairments via gait training, therapeutic activities, therapeutic exercises and progress toward the following goals:    · Plan of Care Expires:  01/03/19    Subjective     Chief Complaint: none  Patient/Family Comments/goals: " when am I leaving the hospital ?"  Pain/Comfort:  · Pain Rating 1: 0/10  · Pain Rating Post-Intervention 1: 0/10      Objective:     Communicated with nurse Becki prior to session.  Patient found all lines intact, call button in reach and bed alarm on bed alarm, telemetry  upon PT entry to room.     General Precautions: Standard, fall   Orthopedic Precautions:(heel touch weight bearing to R LE)   Braces:  CAM boot to RLE      Functional Mobility:  · Bed Mobility: pt found in bed with soiled diaper. Performed rolling /turning in bed to get clean up and brief change.   · Rolling Left:  stand by assistance  · Rolling " Right: stand by assistance  · Scooting: stand by assistance  · Supine to Sit: stand by assistance and contact guard assistance  · Transfers:     · Sit to Stand: x 2 trials from bed  minimum assistance with rolling walker. V/T cues for safety technique and walker management.   · Gait: pt ambulated ~ 60 ft with RW, CGA/SBA (chair followed, CAM boot to RLE). Pt able to maintain RLE heel touch WB with VC's. Noted with decreased ajay, decreased step length, decreased weight shifting ability. V/T cues for safety and walker management.       AM-PAC 6 CLICK MOBILITY  Turning over in bed (including adjusting bedclothes, sheets and blankets)?: 4  Sitting down on and standing up from a chair with arms (e.g., wheelchair, bedside commode, etc.): 3  Moving from lying on back to sitting on the side of the bed?: 4  Moving to and from a bed to a chair (including a wheelchair)?: 3  Need to walk in hospital room?: 3  Climbing 3-5 steps with a railing?: 2  Basic Mobility Total Score: 19       Therapeutic Activities and Exercises:   pt performed bed mobility, transfer and gait training as above.   Don/doff gown with MIN A.   Encouraged pt to perform BLE EX'S while in bed or chair per hand out. Pt verbalize understanding.     Patient left up in reclined chair on cushion with all lines intact, call button in reach and nurse Becki  notified..    GOALS:   Multidisciplinary Problems     Physical Therapy Goals        Problem: Physical Therapy Goal    Goal Priority Disciplines Outcome Goal Variances Interventions   Physical Therapy Goal     PT, PT/OT Ongoing (interventions implemented as appropriate)     Description:  Goals to be met by: 1/3/19    Patient will increase functional independence with mobility by performin. Supine to sit with supervision  2. Sit to stand transfer with supervision  3. Gait x100 feet with supervision using Rolling Walker  4. Lower extremity exercise program x30 reps per handout, with supervision                       Time Tracking:     PT Received On: 01/02/19  PT Start Time: 1636     PT Stop Time: 1700  PT Total Time (min): 24 min     Billable Minutes: Gait Training 14 and Therapeutic Activity 10    Treatment Type: Treatment  PT/PTA: PTA     PTA Visit Number: 3     Veronica Curry, PTA  01/02/2019

## 2019-01-03 NOTE — PROGRESS NOTES
[facility] Hospital Medicine  Progress Note    Patient Name: Khanh Galvan  MRN: [id number]  Patient Class: IP- Inpatient   Admission Date: 12/23/2018  Length of Stay: 11 days  Attending Physician: Sonia Sharma MD  Primary Care Provider: JONY Pena        Subjective:     Principal Problem:Subacute osteomyelitis of right foot    HPI:    Khanh Galvan is a 58 y.o. male that (in part)  has a past medical history of Diabetes mellitus, Hypertension, and Subacute osteomyelitis.  has a past surgical history that includes left foot. Presents to [facility] Emergency Department by EMS who was called for fatigue.  When EMS arrived the patient was lethargic and nearly attended.  He was found to be hyperglycemic and hypotensive.  IV fluids were initiated and mental status improved along with his blood pressure.  He was found to have a right-sided diabetic ft lesion.  He reports sustaining injury from stubbing his right great toe on the stairs.  He developed a painful, red, swollen, and warm lesion to his right great toe that has worsened over the last 3 days.  Unfortunately the patient is a poor historian and history is limited.      In the emergency department routine laboratory studies were obtained and x-ray the right foot was performed.  He had elevated ESR, CRP, and physical exam was consistent with diabetic foot lesion with osteomyelitis.  He was given IV fluids and started on empiric antibiotic therapy due to associated cellulitis and concern for possible bacteremia with systemic infection causing infectious encephalopathy.    Hospital medicine has been asked to admit for further evaluation and treatment.     Hospital Course:  Mr Galvan presented with subacute osteomyelitis of right big toe. Podiatry consulted. Underwent partial amputation of right toe. Biopsy of amputated bone showed moderate Klebsiella (ESBL), many Strep Group B and few Hafnia alvei. ID  "consulted. Abx tailored to ertapenem. Per son, patient lives with him, his wife and a baby who is a heart patient and were concerned about baby being exposed to bacteria. SNF was planned. Biopsy of proximal bone resulted as "Viable bone exhibiting no inflammatory infiltrates". Discussed with ID who recommended no further abx treatment, just wound care. Patient can go home with HH. Has a walker at home. Family requested another day as they live very far and need to plan ahead to pick him up. Patient is in agreement with [facility] NH if needed. Will discuss with SW.     Await [facility] NH placement     No new subjective & objective note has been filed under this hospital service since the last note was generated.    Assessment/Plan:      * Subacute osteomyelitis of right foot    POD 6 partial amputation right toe  Is clinically stable  BG close to goal. Adjusting insulin as needed for goal BG <180  BCx NGTD. Bone Cx with moderate Klebsiella ESBL, many Strep group B and Hafnia alvei   Pathology of proximal bone showed "Viable bone exhibiting no inflammatory infiltrates"  Discussed with ID who recommends no further abx treatment, just wound care  Wound care per Podiatry         Acute osteomyelitis of toe, right    S/p I&D with partial amputation of right toe  Path report:  Part 1  Distal great toe (submitted as right distal great toe):  -Cutaneous ulceration and necrosis with intense acute inflammation extending into superficial  subcutaneous tissues  -Dermal fibrosis and chronic perivascular inflammation  -Ulceration is focally present at the cutaneous resection margin. Soft tissues and bone at margins appear  viable  Part 2  Segment of tubular bone (submitted as proximal margin right great toe):  -Viable bone exhibiting no inflammatory infiltrates  -Attached viable fibroadipose tissues without significant histopathologic abnormalities    No IV indicated   Continue wound care      Diabetic ulcer of right great toe    "        Sepsis           Morbid obesity due to excess calories    Spent > 5 minutes on weight loss education       Elevated lactic acid level    2/2 sepsis  Peripheral pulses normal with adequate resuscitation  Good appetite and PO intake       Type 2 diabetes mellitus with right diabetic foot infection    Poorly controlled  Adjusting insulin regimen for goal BG < 180    Increase basal and prandial doses       Cellulitis of right foot    With evidence of osteo  Management as above         VTE Risk Mitigation (From admission, onward)        Ordered     enoxaparin injection 40 mg  Daily      12/25/18 1528     IP VTE HIGH RISK PATIENT  Once      12/24/18 1712     Place HUGH hose  Until discontinued      12/23/18 2159     Place sequential compression device  Until discontinued      12/23/18 2159              Sonia Sharma MD  Department of Hospital Medicine   [facility]

## 2019-01-03 NOTE — PLAN OF CARE
Awaiting insurance authorization. Patient accepted to [facility].       01/03/19 1320   Discharge Reassessment   Assessment Type Discharge Planning Reassessment   Provided patient/caregiver education on the expected discharge date and the discharge plan Yes   Do you have any problems affording any of your prescribed medications? No   Discharge Plan A Skilled Nursing Facility   Discharge Plan B Skilled Nursing Facility   Anticipated Discharge Disposition SNF   Can the patient answer the patient profile reliably? Yes, cognitively intact   How does the patient rate their overall health at the present time? Good   Describe the patient's ability to walk at the present time. Minor restrictions or changes   How often would a person be available to care for the patient? Often   Number of comorbid conditions (as recorded on the chart) Two   During the past month, has the patient often been bothered by feeling down, depressed or hopeless? No   During the past month, has the patient often been bothered by little interest or pleasure in doing things? No   Post-Acute Status   Post-Acute Authorization Placement   Post-Acute Placement Status Pending Payor Review

## 2019-01-03 NOTE — PLAN OF CARE
Problem: Physical Therapy Goal  Goal: Physical Therapy Goal  Goals to be met by: 1/3/19    Patient will increase functional independence with mobility by performin. Supine to sit with supervision  2. Sit to stand transfer with supervision  3. Gait x100 feet with supervision using Rolling Walker  4. Lower extremity exercise program x30 reps per handout, with supervision     Outcome: Ongoing (interventions implemented as appropriate)  Pt will benefit from further therapy in order to get back to PLOF.

## 2019-01-03 NOTE — PROGRESS NOTES
1146- [place] contacted Murphy at [facility] to inquire of patient's authorization. Murphy informed TN that [facility] is requesting updated PT/OT notes and progress notes.    TN sent updated clinicals to Murphy via [employer].    2392- [place] contacted patient's daughter in law Viry to inform that patient has been clinically accepted to [facility] pending insurance authorization. [place] provided Viry with Murphy's contact information to discuss signing of paperwork.

## 2019-01-03 NOTE — PLAN OF CARE
Problem: Physical Therapy Goal  Goal: Physical Therapy Goal  Goals to be met by: 1/3/19    Patient will increase functional independence with mobility by performin. Supine to sit with supervision  2. Sit to stand transfer with supervision  3. Gait x100 feet with supervision using Rolling Walker  4. Lower extremity exercise program x30 reps per handout, with supervision     Outcome: Ongoing (interventions implemented as appropriate)  Pt ambulated ~ 40 ft with RW, SBA ( CAM boot to RLE and chair followed ) . Pt tolerated treatment well.

## 2019-01-03 NOTE — NURSING
Patient's scrotum is reddened. Removed diaper from patient and instructed him to use the urinal provided.  Allison patient tech cleaned the residual barrier cream from scrotum as it had turned crusted and dry. Area was cleansed thoroughly and left open to air to prevent further moisture. Rounding on patient frequently to ensure he remains clean and dry.

## 2019-01-03 NOTE — PT/OT/SLP PROGRESS
Physical Therapy Treatment    Patient Name:  Khanh Galvan   MRN:  [id number]    Recommendations:     Discharge Recommendations:  (home with home health and family assistance)   Discharge Equipment Recommendations: none   Barriers to discharge: Decreased caregiver support    Assessment:     Khanh Galvan is a 58 y.o. male admitted with a medical diagnosis of Subacute osteomyelitis of right foot.  He presents with the following impairments/functional limitations:  weakness, impaired endurance, impaired self care skills, impaired functional mobilty, gait instability, impaired balance, decreased lower extremity function, decreased safety awareness, impaired skin, decreased ROM, edema, orthopedic precautions .    Rehab Prognosis: Good; patient would benefit from acute skilled PT services to address these deficits and reach maximum level of function.    Recent Surgery: Procedure(s) (LRB):  INCISION AND DRAINAGE (Right)  AMPUTATION, TOE PARTIAL RIGHT GREAT TOE (Right) 10 Days Post-Op    Plan:     During this hospitalization, patient to be seen 6 x/week to address the identified rehab impairments via gait training, therapeutic activities, therapeutic exercises and progress toward the following goals:    · Plan of Care Expires:  01/03/19    Subjective     Chief Complaint: none  Patient/Family Comments/goals: to get back to PLOF  Pain/Comfort:  · Pain Rating 1: 0/10  · Pain Rating Post-Intervention 1: 0/10      Objective:     Communicated with nurse Cisneros prior to session.  Patient found all lines intact and call button in reach bed alarm, telemetry  upon PT entry to room.     General Precautions: Standard, fall   Orthopedic Precautions:(heel touch weight bearing to R LE)   Braces:   CAM boot to RLE     Functional Mobility:  · Transfers:     · Sit to Stand:  contact guard assistance with rolling walker. VC's for safety technique and walker management .  · Gait: Pt ambulated ~ 40 ft with RW, SBA ( CAM boot to RLE and chair  followed . Pt able to maintain RLE heel touch WB with VC's. Noted with decreased ajay, decreased step length, decreased weight shifting ability. V/T cues for safety and walker management.       AM-PAC 6 CLICK MOBILITY  Turning over in bed (including adjusting bedclothes, sheets and blankets)?: 4  Sitting down on and standing up from a chair with arms (e.g., wheelchair, bedside commode, etc.): 3  Moving from lying on back to sitting on the side of the bed?: 4  Moving to and from a bed to a chair (including a wheelchair)?: 3  Need to walk in hospital room?: 3  Climbing 3-5 steps with a railing?: 3  Basic Mobility Total Score: 20       Therapeutic Activities and Exercises:   pt reported that he has been doing his BLE ex's throughout the day. Encouraged pt continue performing BLE ex's while in bed or chair . Pt verbalize understanding.    Patient left up in reclined chair on cushion  with all lines intact, call button in reach and nurse notified..    GOALS:   Multidisciplinary Problems     Physical Therapy Goals        Problem: Physical Therapy Goal    Goal Priority Disciplines Outcome Goal Variances Interventions   Physical Therapy Goal     PT, PT/OT Ongoing (interventions implemented as appropriate)     Description:  Goals to be met by: 1/3/19    Patient will increase functional independence with mobility by performin. Supine to sit with supervision  2. Sit to stand transfer with supervision  3. Gait x100 feet with supervision using Rolling Walker  4. Lower extremity exercise program x30 reps per handout, with supervision                      Time Tracking:     PT Received On: 19  PT Start Time: 1609     PT Stop Time: 1618  PT Total Time (min): 9 min     Billable Minutes: Gait Training 9    Treatment Type: Treatment  PT/PTA: PTA     PTA Visit Number: 3     Veronica Curry, PTA  2019

## 2019-01-03 NOTE — PROGRESS NOTES
Call received from Murphy at [facility]. Murphy stated she spoke with patient's daughter Vilma whom is POA. Murphy stated that Vilma informed her that patient was checked out of [facility] by her sister in law and brother without her being notified. Vilma is also unaware of where patient's checks are being sent. Vilma is unwilling to take patient home.     Murphy is not willing to accept patient without completion of long term medicaid sabrina and copy of POA paperwork.

## 2019-01-03 NOTE — PROGRESS NOTES
[facility]  Podiatry  Progress Note    Patient Name: Khanh Galvan  MRN: [id number]  Admission Date: 12/23/2018  Hospital Length of Stay: 11 days  Attending Physician: Sonia Sharma MD  Primary Care Provider: JONY Pena     Subjective:     Interval History:  Patient resting comfortably in recliner with daughter-in-law at bedside.  Non new pedal complaints, no acute events overnight.  Awaiting placement    Follow-up For: Procedure(s) (LRB):  INCISION AND DRAINAGE (Right)  AMPUTATION, TOE PARTIAL RIGHT GREAT TOE (Right)    Post-Operative Day: 10 Days Post-Op    Scheduled Meds:   enoxaparin  40 mg Subcutaneous Daily    insulin aspart U-100  12 Units Subcutaneous TIDWM    insulin detemir U-100  35 Units Subcutaneous Daily    lisinopril  40 mg Oral Daily     Continuous Infusions:  PRN Meds:acetaminophen, dextrose 50%, glucagon (human recombinant), hydrALAZINE, influenza, insulin aspart U-100, ondansetron, pneumoc 13-dutch conj-dip cr(PF), traMADol    Review of Systems   Constitutional: Positive for fatigue. Negative for activity change, appetite change, chills and fever.   Respiratory: Negative for cough and shortness of breath.    Cardiovascular: Negative for chest pain and leg swelling.   Gastrointestinal: Negative for diarrhea, nausea and vomiting.   Musculoskeletal: Positive for gait problem and myalgias.   Skin: Positive for wound.   Neurological: Positive for weakness and numbness.        + paresthesia      Objective:     Vital Signs (Most Recent):  Temp: 99.3 °F (37.4 °C) (01/03/19 2005)  Pulse: 82 (01/03/19 2005)  Resp: 18 (01/03/19 2005)  BP: 131/69 (01/03/19 2005)  SpO2: 98 % (01/03/19 2005) Vital Signs (24h Range):  Temp:  [98.4 °F (36.9 °C)-99.3 °F (37.4 °C)] 99.3 °F (37.4 °C)  Pulse:  [70-82] 82  Resp:  [18] 18  SpO2:  [94 %-98 %] 98 %  BP: (116-150)/(65-91) 131/69     Weight: 120.8 kg (266 lb 6.4 oz)  Body mass index is 37.16 kg/m².    Foot Exam    General: Pt. is  well-developed, well-nourished, appears stated age, in no acute distress, alert and oriented x 3. No evidence of depression, anxiety, or agitation. Calm, cooperative, and communicative. Appropriate interactions and affect.    Surgical Site  Incision: appears well coapted beneath dry blood.  Dry peeling skin to the digit from resolve dblister  Signs of infection: none  Drainage: none    01/03            Laboratory:  CBC: No results for input(s): WBC, RBC, HGB, HCT, PLT, MCV, MCH, MCHC in the last 168 hours.  CMP: No results for input(s): GLU, CALCIUM, ALBUMIN, PROT, NA, K, CO2, CL, BUN, CREATININE, ALKPHOS, ALT, AST, BILITOT in the last 168 hours.  Microbiology Results (last 7 days)     Procedure Component Value Units Date/Time    Aerobic culture [[id number]]  (Susceptibility) Collected:  12/24/18 1458    Order Status:  Completed Specimen:  Bone from Toe, Right Foot Updated:  12/29/18 1218     Aerobic Bacterial Culture Results called to and read back by:Pili Laird-[address] [place] [address]     Aerobic Bacterial Culture --     KLEBSIELLA PNEUMONIAE ESBL  Moderate       Aerobic Bacterial Culture --     STREPTOCOCCUS AGALACTIAE (GROUP B)  Many  Beta-hemolytic streptococci are routinely susceptible to   penicillins,cephalosporins and carbapenems.       Aerobic Bacterial Culture --     HAFNIA ALVEI  Few      Blood culture x two cultures. Draw prior to antibiotics. [[id number]] Collected:  12/23/18 1912    Order Status:  Completed Specimen:  Blood from Peripheral, Hand, Right Updated:  12/28/18 2103     Blood Culture, Routine No growth after 5 days.    Narrative:       Aerobic and anaerobic    Blood culture x two cultures. Draw prior to antibiotics. [[id number]] Collected:  12/23/18 1850    Order Status:  Completed Specimen:  Blood from Peripheral, Hand, Left Updated:  12/28/18 2103     Blood Culture, Routine No growth after 5 days.    Narrative:       Aerobic and anaerobic        Specimen (12h ago, onward)    None          Diagnostic  Results:  Imaging Results          X-Ray Foot Complete Right (Final result)  Result time 12/23/18 19:53:33    Final result by Rain Delgado MD (12/23/18 19:53:33)                 Impression:      See above.      Electronically signed by: Rain Delgado MD  Date:    12/23/2018  Time:    19:53             Narrative:    EXAMINATION:  XR FOOT COMPLETE 3 VIEW RIGHT    CLINICAL HISTORY:  . Osteomyelitis, unspecified    TECHNIQUE:  AP, lateral, and oblique views of the right foot were performed.    COMPARISON:  None.    FINDINGS:  No evidence of acute fracture or dislocation.  Small ulceration with suspected small amount of soft tissue air seen at the distal aspect of the great toe. No definite osseous erosive or destructive changes to suggest osteomyelitis.  Future MRI follow-up would be more sensitive for detection if clinical concern exists.                               X-Ray Chest AP Portable (Final result)  Result time 12/23/18 19:50:32    Final result by Rain Delgado MD (12/23/18 19:50:32)                 Impression:      Cardiomegaly with suspected mild pulmonary edema.      Electronically signed by: Rain Delgado MD  Date:    12/23/2018  Time:    19:50             Narrative:    EXAMINATION:  XR CHEST AP PORTABLE    CLINICAL HISTORY:  Sepsis;    TECHNIQUE:  Single frontal view of the chest was performed.    COMPARISON:  03/02/2018.    FINDINGS:  Heart is enlarged but stable in size.  There is prominence of central pulmonary vasculature with mild perihilar opacity and diffuse increased interstitial attenuation most suggestive for mild pulmonary edema.  No evidence of focal consolidation, pneumothorax, or large effusion.  No acute osseous abnormality identified.                                  Assessment/Plan:     Active Diagnoses:    Diagnosis Date Noted POA    PRINCIPAL PROBLEM:  Subacute osteomyelitis of right foot [M86.271] 02/26/2018 Yes    Morbid obesity due to excess calories [E66.01] 12/24/2018  Yes    Sepsis [A41.9] 12/24/2018 Yes    Diabetic ulcer of right great toe [E11.621, L97.519]  Yes    Acute osteomyelitis of toe, right [M86.171]  Yes    Elevated lactic acid level [R79.89] 12/23/2018 Yes    Cellulitis of right foot [L03.115] 02/24/2018 Yes    Type 2 diabetes mellitus with right diabetic foot infection [E11.628, L08.9] 02/24/2018 Yes      Problems Resolved During this Admission:       POD #10 s/p  days right partial hallux amputation. Sutures intact no purulent drainage noted.     Painted with betadine covered with adaptic, 4x4 gauze wrapped with kerlix, cast padding, and coban in football stye dressing.      Bandage can stay intact for 5-7 days until clinic visit.     Proximal margin pathology clear      If still inpatient will change bandage and remove sutures on on 01/08/18.      Partial heel WB right foot with CAM boot.      Ok for discharge from podiatry standpoint, placement pending secondary to domestic concerns with sick minor in the home.    Follow in clinic within 5 days of discharge, no home or facility dressing changes required    Cary Villanueva DPM  Podiatry  [facility]

## 2019-01-03 NOTE — PROGRESS NOTES
TN contacted patient's daughter Vilma.  No answer. Voice message left.     1622- TN contacted [facility]. Office closed for the day. Will follow up tomorrow.

## 2019-01-04 LAB
POCT GLUCOSE: 182 MG/DL (ref 70–110)
POCT GLUCOSE: 237 MG/DL (ref 70–110)
POCT GLUCOSE: 270 MG/DL (ref 70–110)
POCT GLUCOSE: 281 MG/DL (ref 70–110)

## 2019-01-04 PROCEDURE — 97535 SELF CARE MNGMENT TRAINING: CPT

## 2019-01-04 PROCEDURE — 97116 GAIT TRAINING THERAPY: CPT

## 2019-01-04 PROCEDURE — 25000003 PHARM REV CODE 250: Performed by: INTERNAL MEDICINE

## 2019-01-04 PROCEDURE — 11000001 HC ACUTE MED/SURG PRIVATE ROOM

## 2019-01-04 PROCEDURE — 97110 THERAPEUTIC EXERCISES: CPT

## 2019-01-04 PROCEDURE — 63600175 PHARM REV CODE 636 W HCPCS: Performed by: INTERNAL MEDICINE

## 2019-01-04 RX ADMIN — ENOXAPARIN SODIUM 40 MG: 100 INJECTION SUBCUTANEOUS at 05:01

## 2019-01-04 RX ADMIN — INSULIN ASPART 12 UNITS: 100 INJECTION, SOLUTION INTRAVENOUS; SUBCUTANEOUS at 05:01

## 2019-01-04 RX ADMIN — INSULIN ASPART 12 UNITS: 100 INJECTION, SOLUTION INTRAVENOUS; SUBCUTANEOUS at 08:01

## 2019-01-04 RX ADMIN — INSULIN DETEMIR 35 UNITS: 100 INJECTION, SOLUTION SUBCUTANEOUS at 08:01

## 2019-01-04 RX ADMIN — LISINOPRIL 40 MG: 20 TABLET ORAL at 08:01

## 2019-01-04 RX ADMIN — INSULIN ASPART 3 UNITS: 100 INJECTION, SOLUTION INTRAVENOUS; SUBCUTANEOUS at 09:01

## 2019-01-04 NOTE — PT/OT/SLP PROGRESS
Occupational Therapy   Treatment    Name: Khanh Galvan  MRN: [id number]  Admitting Diagnosis:  Subacute osteomyelitis of right foot  11 Days Post-Op    Recommendations:     Discharge Recommendations: (home with  services and family assist)  Discharge Equipment Recommendations:  none  Barriers to discharge:       Subjective   Patient agreeable to therapy.   Pain/Comfort:  · Pain Rating 1: 0/10    Objective:     Communicated with: Nurse Tyler prior to session.  Patient found with all lines intact and call button in reach and bed alarm upon OT entry to room.    General Precautions: Standard, fall   Orthopedic Precautions:(heel touch weightbearing to RLE)   Braces: (CAM boot to RLE)     Occupational Performance:    Bed Mobility:    · Patient completed Scooting/Bridging with stand by assistance  · Patient completed Supine to Sit with stand by assistance     Functional Mobility/Transfers:  · Patient completed Sit <> Stand Transfer with contact guard assistance  with  rolling walker   · Functional Mobility: Patient able to ambulate to sink and chair with SBA-CGA/RW and verbal cues for heel touch weightbearing to RLE, safety , and walker management.     Activities of Daily Living:  · Grooming: supervision and stand by assistance standing at sink for oral care and to wash face  · Upper Body Dressing: contact guard assistance      [facility] 6 Click ADL: 20    Treatment & Education:  Patient performed bed mobility, functional transfers, and ADL's as above.  Patient educated on and performed BUE therex HEP with green theraband. Patient performed B shoulder flex, B shoulder abd, B shoulder horizontal abd, B elbow flex, and B elbow ext x 15 reps between rest. Patient instructed on pursed lip breathing technique with therex. Patient provided with written HEP, patient verbalizes understanding.     Patient left reclined up in chair with LLE elevated all lines intact, call button in reach and nurse notified. Patient instructed to  call for nursing assistance before OOC for safety. Patient verbalizes understanding.   Education:    Assessment:     Khanh Galvan is a 58 y.o. male with a medical diagnosis of Subacute osteomyelitis of right foot.  He presents with the following performance deficits affecting function are weakness, impaired endurance, impaired self care skills, impaired functional mobilty, gait instability, impaired balance, decreased upper extremity function, decreased lower extremity function, decreased coordination, decreased safety awareness, orthopedic precautions. Patient tolerated OT session well with good participation. Patient progressing.     Rehab Prognosis:  Good; patient would benefit from acute skilled OT services to address these deficits and reach maximum level of function.       Plan:     Patient to be seen 5 x/week to address the above listed problems via self-care/home management, therapeutic activities, therapeutic exercises  · Plan of Care Expires: 01/10/19  · Plan of Care Reviewed with: patient    This Plan of care has been discussed with the patient who was involved in its development and understands and is in agreement with the identified goals and treatment plan    GOALS:   Multidisciplinary Problems     Occupational Therapy Goals        Problem: Occupational Therapy Goal    Goal Priority Disciplines Outcome Interventions   Occupational Therapy Goal     OT, PT/OT Ongoing (interventions implemented as appropriate)    Description:  Goals to be met by: 1/10/18    Patient will increase functional independence with ADLs by performing:    UE Dressing with Stand-by Assistance.  LE Dressing with Stand-by Assistance.  Grooming while standing at sink with Stand-by Assistance. Met  Supine to sit with Stand-by Assistance. Met  Step transfer with Stand-by Assistance  Toilet transfer to toilet with Stand-by Assistance.  Upper extremity exercise program x15 reps per handout, with assistance as needed. Met 12/28/18                      Time Tracking:     OT Date of Treatment: 01/04/19  OT Start Time: 1029  OT Stop Time: 1053  OT Total Time (min): 24 min    Billable Minutes:Self Care/Home Management 12  Therapeutic Exercise 12    CHANTE Collier  1/4/2019

## 2019-01-04 NOTE — PLAN OF CARE
Problem: Occupational Therapy Goal  Goal: Occupational Therapy Goal  Goals to be met by: 1/10/18    Patient will increase functional independence with ADLs by performing:    UE Dressing with Stand-by Assistance.  LE Dressing with Stand-by Assistance.  Grooming while standing at sink with Stand-by Assistance.  Supine to sit with Stand-by Assistance.  Step transfer with Stand-by Assistance  Toilet transfer to toilet with Stand-by Assistance.  Upper extremity exercise program x15 reps per handout, with assistance as needed. Met 12/28/18    Outcome: Ongoing (interventions implemented as appropriate)  Patient tolerated OT session well with good participation. Patient progressing.

## 2019-01-04 NOTE — PLAN OF CARE
Problem: Adult Inpatient Plan of Care  Goal: Plan of Care Review  Outcome: Ongoing (interventions implemented as appropriate)  Pt able to address needs, bg monitoring and SSI prn, safety maintained, OOBTC this shift, PT following, pt awaiting placement to SNF placement, 0 c/o pain this is shift, bed low locked and in position, will cont plan of care

## 2019-01-04 NOTE — PLAN OF CARE
Problem: Physical Therapy Goal  Goal: Physical Therapy Goal  Goals to be met by: 19    Patient will increase functional independence with mobility by performin. Supine to sit with supervision - met  2. Sit to stand transfer with supervision - met  3. Gait x100 feet with supervision using Rolling Walker  4. Lower extremity exercise program x30 reps per handout, with supervision       Plan of care updated. PT will continue to see patient 3x's/week. Patient okay to ambulate in hallway with nursing staff supervision, CAM boot, and rolling walker.

## 2019-01-04 NOTE — PROGRESS NOTES
TN met with patient to give an update on his SNF placement. Patient informed that [facility] not willing [facility] him therefore his clinicals were sent to other facilities for group home placement. Patient verbalized understanding. TN inquired if his daughter Vilma has POA. Patient replied yes. TN informed patient that Vilma will be contacted for signing of documents at time of discharge. TN also inquired if his finances. Patient stated he doesn't receive any checks. Patient stated he received disability checks before but not any more.

## 2019-01-04 NOTE — PROGRESS NOTES
[facility] Hospital Medicine  Progress Note    Patient Name: Khanh Galvan  MRN: [id number]  Patient Class: IP- Inpatient   Admission Date: 12/23/2018  Length of Stay: 11 days  Attending Physician: Sonia Sharma MD  Primary Care Provider: JONY Pena        Subjective:     Principal Problem:Subacute osteomyelitis of right foot    HPI:    Khanh Galvan is a 58 y.o. male that (in part)  has a past medical history of Diabetes mellitus, Hypertension, and Subacute osteomyelitis.  has a past surgical history that includes left foot. Presents to [facility] Emergency Department by EMS who was called for fatigue.  When EMS arrived the patient was lethargic and nearly attended.  He was found to be hyperglycemic and hypotensive.  IV fluids were initiated and mental status improved along with his blood pressure.  He was found to have a right-sided diabetic ft lesion.  He reports sustaining injury from stubbing his right great toe on the stairs.  He developed a painful, red, swollen, and warm lesion to his right great toe that has worsened over the last 3 days.  Unfortunately the patient is a poor historian and history is limited.      In the emergency department routine laboratory studies were obtained and x-ray the right foot was performed.  He had elevated ESR, CRP, and physical exam was consistent with diabetic foot lesion with osteomyelitis.  He was given IV fluids and started on empiric antibiotic therapy due to associated cellulitis and concern for possible bacteremia with systemic infection causing infectious encephalopathy.    Hospital medicine has been asked to admit for further evaluation and treatment.     Hospital Course:  Mr Galvan presented with subacute osteomyelitis of right big toe. Podiatry consulted. Underwent partial amputation of right toe. Biopsy of amputated bone showed moderate Klebsiella (ESBL), many Strep Group B and few Hafnia alvei. ID  "consulted. Abx tailored to ertapenem. Per son, patient lives with him, his wife and a baby who is a heart patient and were concerned about baby being exposed to bacteria. SNF was planned. Biopsy of proximal bone resulted as "Viable bone exhibiting no inflammatory infiltrates". Discussed with ID who recommended no further abx treatment, just wound care. Patient can go home with HH. Has a walker at home. Family requested another day as they live very far and need to plan ahead to pick him up. Patient is in agreement with nursing home NH if needed. Will discuss with SW.     Await nursing home NH placement     No new subjective & objective note has been filed under this hospital service since the last note was generated.    Assessment/Plan:      * Subacute osteomyelitis of right foot    POD 6 partial amputation right toe  Is clinically stable  BG close to goal. Adjusting insulin as needed for goal BG <180  BCx NGTD. Bone Cx with moderate Klebsiella ESBL, many Strep group B and Hafnia alvei   Pathology of proximal bone showed "Viable bone exhibiting no inflammatory infiltrates"  Discussed with ID who recommends no further abx treatment, just wound care  Wound care per Podiatry         Acute osteomyelitis of toe, right    S/p I&D with partial amputation of right toe  Path report:  Part 1  Distal great toe (submitted as right distal great toe):  -Cutaneous ulceration and necrosis with intense acute inflammation extending into superficial  subcutaneous tissues  -Dermal fibrosis and chronic perivascular inflammation  -Ulceration is focally present at the cutaneous resection margin. Soft tissues and bone at margins appear  viable  Part 2  Segment of tubular bone (submitted as proximal margin right great toe):  -Viable bone exhibiting no inflammatory infiltrates  -Attached viable fibroadipose tissues without significant histopathologic abnormalities    No IV indicated   Continue wound care      Diabetic ulcer of right great toe    "        Sepsis           Morbid obesity due to excess calories    Spent > 5 minutes on weight loss education       Elevated lactic acid level    2/2 sepsis  Peripheral pulses normal with adequate resuscitation  Good appetite and PO intake       Type 2 diabetes mellitus with right diabetic foot infection    Poorly controlled  Adjusting insulin regimen for goal BG < 180    Increase basal and prandial doses       Cellulitis of right foot    With evidence of osteo  Management as above         VTE Risk Mitigation (From admission, onward)        Ordered     enoxaparin injection 40 mg  Daily      12/25/18 1528     IP VTE HIGH RISK PATIENT  Once      12/24/18 1712     Place HUGH hose  Until discontinued      12/23/18 2159     Place sequential compression device  Until discontinued      12/23/18 2159              Sonia Sharma MD  Department of Hospital Medicine   [facility]

## 2019-01-04 NOTE — PT/OT/SLP PROGRESS
Physical Therapy Treatment    Patient Name:  Khanh Galvan   MRN:  [id number]    Recommendations:     Discharge Recommendations:  (home with HH PT and family assistance as needed)   Discharge Equipment Recommendations: none   Barriers to discharge: Decreased caregiver support    Assessment:     Khanh Galvan is a 58 y.o. male admitted with a medical diagnosis of Subacute osteomyelitis of right foot.  He presents with the following impairments/functional limitations:  weakness, impaired endurance, impaired balance, gait instability, decreased lower extremity function, decreased safety awareness, impaired skin, orthopedic precautions.    Rehab Prognosis: Fair; patient would benefit from acute skilled PT services to address these deficits and reach maximum level of function.    Recent Surgery: Procedure(s) (LRB):  INCISION AND DRAINAGE (Right)  AMPUTATION, TOE PARTIAL RIGHT GREAT TOE (Right) 11 Days Post-Op    Plan:     During this hospitalization, patient to be seen 3 x/week to address the identified rehab impairments via gait training, therapeutic activities, therapeutic exercises and progress toward the following goals:    · Plan of Care Expires:  01/18/19    Subjective     Chief Complaint: Wants to walk further.   Patient/Family Comments/goals: For right foot to heal.   Pain/Comfort:  · Pain Rating 1: 0/10      Objective:     Communicated with nurse Dawson prior to session.  Patient found reclined in bedside chair with PICC line  upon PT entry to room.     General Precautions: Standard, fall   Orthopedic Precautions:(heel touch weight bearing on R LE)   Braces: (CAM boot)     Functional Mobility:  · Transfers:     · Sit to Stand:  stand by assistance with rolling walker  · Gait:  Patient ambulated 80ft with Rolling Walker and SBA using 3-point gait. Patient demonstrated decreased ajay, decreased velocity of limb motion and decreased step length during gait due to impaired balance.    AM-PAC 6 CLICK  MOBILITY  Turning over in bed (including adjusting bedclothes, sheets and blankets)?: 4  Sitting down on and standing up from a chair with arms (e.g., wheelchair, bedside commode, etc.): 4  Moving from lying on back to sitting on the side of the bed?: 4  Moving to and from a bed to a chair (including a wheelchair)?: 4  Need to walk in hospital room?: 3  Climbing 3-5 steps with a railing?: 3  Basic Mobility Total Score: 22     Patient left up in chair with all lines intact, call button in reach and nurse Samir notified. Patient setup for lunch prior to PT leaving room.     GOALS:   Multidisciplinary Problems     Physical Therapy Goals        Problem: Physical Therapy Goal    Goal Priority Disciplines Outcome Goal Variances Interventions   Physical Therapy Goal     PT, PT/OT Ongoing (interventions implemented as appropriate)     Description:  Goals to be met by: 19    Patient will increase functional independence with mobility by performin. Supine to sit with supervision - met  2. Sit to stand transfer with supervision - met  3. Gait x100 feet with supervision using Rolling Walker  4. Lower extremity exercise program x30 reps per handout, with supervision                       Time Tracking:     PT Received On: 19  PT Start Time: 1141     PT Stop Time: 1151  PT Total Time (min): 10 min     Billable Minutes: Gait Training  10     Treatment Type: Treatment  PT/PTA: PT     PTA Visit Number: 0     Shayna Jeffers, PT  2019

## 2019-01-05 LAB
POCT GLUCOSE: 130 MG/DL (ref 70–110)
POCT GLUCOSE: 137 MG/DL (ref 70–110)
POCT GLUCOSE: 262 MG/DL (ref 70–110)
POCT GLUCOSE: 284 MG/DL (ref 70–110)

## 2019-01-05 PROCEDURE — 63600175 PHARM REV CODE 636 W HCPCS: Performed by: INTERNAL MEDICINE

## 2019-01-05 PROCEDURE — 11000001 HC ACUTE MED/SURG PRIVATE ROOM

## 2019-01-05 PROCEDURE — 25000003 PHARM REV CODE 250: Performed by: INTERNAL MEDICINE

## 2019-01-05 RX ADMIN — ENOXAPARIN SODIUM 40 MG: 100 INJECTION SUBCUTANEOUS at 05:01

## 2019-01-05 RX ADMIN — INSULIN DETEMIR 35 UNITS: 100 INJECTION, SOLUTION SUBCUTANEOUS at 09:01

## 2019-01-05 RX ADMIN — LISINOPRIL 40 MG: 20 TABLET ORAL at 08:01

## 2019-01-05 RX ADMIN — INSULIN ASPART 3 UNITS: 100 INJECTION, SOLUTION INTRAVENOUS; SUBCUTANEOUS at 08:01

## 2019-01-05 RX ADMIN — INSULIN ASPART 6 UNITS: 100 INJECTION, SOLUTION INTRAVENOUS; SUBCUTANEOUS at 12:01

## 2019-01-05 RX ADMIN — INSULIN ASPART 12 UNITS: 100 INJECTION, SOLUTION INTRAVENOUS; SUBCUTANEOUS at 12:01

## 2019-01-05 NOTE — PROGRESS NOTES
[facility] Hospital Medicine  Progress Note    Patient Name: Khanh Galvan  MRN: [id number]  Patient Class: IP- Inpatient   Admission Date: 12/23/2018  Length of Stay: 12 days  Attending Physician: Sonia Sharma MD  Primary Care Provider: JONY Pena        Subjective:     Principal Problem:Subacute osteomyelitis of right foot    HPI:    Khanh Galvan is a 58 y.o. male that (in part)  has a past medical history of Diabetes mellitus, Hypertension, and Subacute osteomyelitis.  has a past surgical history that includes left foot. Presents to [facility] Emergency Department by EMS who was called for fatigue.  When EMS arrived the patient was lethargic and nearly attended.  He was found to be hyperglycemic and hypotensive.  IV fluids were initiated and mental status improved along with his blood pressure.  He was found to have a right-sided diabetic ft lesion.  He reports sustaining injury from stubbing his right great toe on the stairs.  He developed a painful, red, swollen, and warm lesion to his right great toe that has worsened over the last 3 days.  Unfortunately the patient is a poor historian and history is limited.      In the emergency department routine laboratory studies were obtained and x-ray the right foot was performed.  He had elevated ESR, CRP, and physical exam was consistent with diabetic foot lesion with osteomyelitis.  He was given IV fluids and started on empiric antibiotic therapy due to associated cellulitis and concern for possible bacteremia with systemic infection causing infectious encephalopathy.    Hospital medicine has been asked to admit for further evaluation and treatment.     Hospital Course:  Mr Galvan presented with subacute osteomyelitis of right big toe. Podiatry consulted. Underwent partial amputation of right toe. Biopsy of amputated bone showed moderate Klebsiella (ESBL), many Strep Group B and few Hafnia alvei. ID  "consulted. Abx tailored to ertapenem. Per son, patient lives with him, his wife and a baby who is a heart patient and were concerned about baby being exposed to bacteria. SNF was planned. Biopsy of proximal bone resulted as "Viable bone exhibiting no inflammatory infiltrates". Discussed with ID who recommended no further abx treatment, just wound care. Patient can go home with HH. Has a walker at home. Family requested another day as they live very far and need to plan ahead to pick him up. Patient is in agreement with alf NH if needed. Will discuss with SW.     Await alf NH placement     No new subjective & objective note has been filed under this hospital service since the last note was generated.    Assessment/Plan:      * Subacute osteomyelitis of right foot    POD 6 partial amputation right toe  Is clinically stable  BG close to goal. Adjusting insulin as needed for goal BG <180  BCx NGTD. Bone Cx with moderate Klebsiella ESBL, many Strep group B and Hafnia alvei   Pathology of proximal bone showed "Viable bone exhibiting no inflammatory infiltrates"  Discussed with ID who recommends no further abx treatment, just wound care  Wound care per Podiatry         Acute osteomyelitis of toe, right    S/p I&D with partial amputation of right toe  Path report:  Part 1  Distal great toe (submitted as right distal great toe):  -Cutaneous ulceration and necrosis with intense acute inflammation extending into superficial  subcutaneous tissues  -Dermal fibrosis and chronic perivascular inflammation  -Ulceration is focally present at the cutaneous resection margin. Soft tissues and bone at margins appear  viable  Part 2  Segment of tubular bone (submitted as proximal margin right great toe):  -Viable bone exhibiting no inflammatory infiltrates  -Attached viable fibroadipose tissues without significant histopathologic abnormalities    No IV indicated   Continue wound care      Diabetic ulcer of right great toe    "        Sepsis           Morbid obesity due to excess calories    Spent > 5 minutes on weight loss education       Elevated lactic acid level    2/2 sepsis  Peripheral pulses normal with adequate resuscitation  Good appetite and PO intake       Type 2 diabetes mellitus with right diabetic foot infection    Poorly controlled  Adjusting insulin regimen for goal BG < 180    Increase basal and prandial doses       Cellulitis of right foot    With evidence of osteo  Management as above         VTE Risk Mitigation (From admission, onward)        Ordered     enoxaparin injection 40 mg  Daily      12/25/18 1528     IP VTE HIGH RISK PATIENT  Once      12/24/18 1712     Place HUGH hose  Until discontinued      12/23/18 2159     Place sequential compression device  Until discontinued      12/23/18 2159              Sonia Sharma MD  Department of Hospital Medicine   [facility]

## 2019-01-05 NOTE — PLAN OF CARE
Problem: Adult Inpatient Plan of Care  Goal: Plan of Care Review  Outcome: Ongoing (interventions implemented as appropriate)  Pt did very well overnight. RLE dressing CDI, boot in place. Neurovascular checks WDL. No c/o pain. No IV, okay'd per MD.  , 3U SSI given. UO WDL. VSSAF. Pending d/c to SNF. No acute events overnight.

## 2019-01-06 LAB
POCT GLUCOSE: 138 MG/DL (ref 70–110)
POCT GLUCOSE: 197 MG/DL (ref 70–110)
POCT GLUCOSE: 237 MG/DL (ref 70–110)

## 2019-01-06 PROCEDURE — 63600175 PHARM REV CODE 636 W HCPCS: Performed by: INTERNAL MEDICINE

## 2019-01-06 PROCEDURE — 63600175 PHARM REV CODE 636 W HCPCS: Performed by: HOSPITALIST

## 2019-01-06 PROCEDURE — 11000001 HC ACUTE MED/SURG PRIVATE ROOM

## 2019-01-06 PROCEDURE — 25000003 PHARM REV CODE 250: Performed by: INTERNAL MEDICINE

## 2019-01-06 RX ADMIN — INSULIN ASPART 4 UNITS: 100 INJECTION, SOLUTION INTRAVENOUS; SUBCUTANEOUS at 05:01

## 2019-01-06 RX ADMIN — LISINOPRIL 40 MG: 20 TABLET ORAL at 08:01

## 2019-01-06 RX ADMIN — INSULIN ASPART 12 UNITS: 100 INJECTION, SOLUTION INTRAVENOUS; SUBCUTANEOUS at 08:01

## 2019-01-06 RX ADMIN — INSULIN ASPART 12 UNITS: 100 INJECTION, SOLUTION INTRAVENOUS; SUBCUTANEOUS at 05:01

## 2019-01-06 RX ADMIN — ENOXAPARIN SODIUM 40 MG: 100 INJECTION SUBCUTANEOUS at 05:01

## 2019-01-06 RX ADMIN — INSULIN ASPART 12 UNITS: 100 INJECTION, SOLUTION INTRAVENOUS; SUBCUTANEOUS at 12:01

## 2019-01-06 RX ADMIN — INSULIN DETEMIR 35 UNITS: 100 INJECTION, SOLUTION SUBCUTANEOUS at 08:01

## 2019-01-06 RX ADMIN — INSULIN ASPART 2 UNITS: 100 INJECTION, SOLUTION INTRAVENOUS; SUBCUTANEOUS at 08:01

## 2019-01-06 NOTE — PROGRESS NOTES
[facility] Hospital Medicine  Progress Note    Patient Name: Khanh Galvan  MRN: [id number]  Patient Class: IP- Inpatient   Admission Date: 12/23/2018  Length of Stay: 13 days  Attending Physician: Sonia Sharma MD  Primary Care Provider: JONY Pena        Subjective:     Principal Problem:Subacute osteomyelitis of right foot    HPI:    Khanh Galvan is a 58 y.o. male that (in part)  has a past medical history of Diabetes mellitus, Hypertension, and Subacute osteomyelitis.  has a past surgical history that includes left foot. Presents to [facility] by EMS who was called for fatigue.  When EMS arrived the patient was lethargic and nearly attended.  He was found to be hyperglycemic and hypotensive.  IV fluids were initiated and mental status improved along with his blood pressure.  He was found to have a right-sided diabetic ft lesion.  He reports sustaining injury from stubbing his right great toe on the stairs.  He developed a painful, red, swollen, and warm lesion to his right great toe that has worsened over the last 3 days.  Unfortunately the patient is a poor historian and history is limited.      In the emergency department routine laboratory studies were obtained and x-ray the right foot was performed.  He had elevated ESR, CRP, and physical exam was consistent with diabetic foot lesion with osteomyelitis.  He was given IV fluids and started on empiric antibiotic therapy due to associated cellulitis and concern for possible bacteremia with systemic infection causing infectious encephalopathy.    Hospital medicine has been asked to admit for further evaluation and treatment.     Hospital Course:  Mr Galvan presented with subacute osteomyelitis of right big toe. Podiatry consulted. Underwent partial amputation of right toe. Biopsy of amputated bone showed moderate Klebsiella (ESBL), many Strep Group B and few Hafnia alvei. ID  "consulted. Abx tailored to ertapenem. Per son, patient lives with him, his wife and a baby who is a heart patient and were concerned about baby being exposed to bacteria. SNF was planned. Biopsy of proximal bone resulted as "Viable bone exhibiting no inflammatory infiltrates". Discussed with ID who recommended no further abx treatment, just wound care. Patient can go home with HH. Has a walker at home. Family requested another day as they live very far and need to plan ahead to pick him up. Patient is in agreement with [facility] NH if needed. Will discuss with SW.     Await [facility] NH placement     No new subjective & objective note has been filed under this hospital service since the last note was generated.    Assessment/Plan:      * Subacute osteomyelitis of right foot    POD 6 partial amputation right toe  Is clinically stable  BG close to goal. Adjusting insulin as needed for goal BG <180  BCx NGTD. Bone Cx with moderate Klebsiella ESBL, many Strep group B and Hafnia alvei   Pathology of proximal bone showed "Viable bone exhibiting no inflammatory infiltrates"  Discussed with ID who recommends no further abx treatment, just wound care  Wound care per Podiatry         Acute osteomyelitis of toe, right    S/p I&D with partial amputation of right toe  Path report:  Part 1  Distal great toe (submitted as right distal great toe):  -Cutaneous ulceration and necrosis with intense acute inflammation extending into superficial  subcutaneous tissues  -Dermal fibrosis and chronic perivascular inflammation  -Ulceration is focally present at the cutaneous resection margin. Soft tissues and bone at margins appear  viable  Part 2  Segment of tubular bone (submitted as proximal margin right great toe):  -Viable bone exhibiting no inflammatory infiltrates  -Attached viable fibroadipose tissues without significant histopathologic abnormalities    No IV indicated   Continue wound care      Diabetic ulcer of right great toe    "        Sepsis           Morbid obesity due to excess calories    Spent > 5 minutes on weight loss education       Elevated lactic acid level    2/2 sepsis  Peripheral pulses normal with adequate resuscitation  Good appetite and PO intake       Type 2 diabetes mellitus with right diabetic foot infection    Poorly controlled  Adjusting insulin regimen for goal BG < 180    Increase basal and prandial doses       Cellulitis of right foot    With evidence of osteo  Management as above         VTE Risk Mitigation (From admission, onward)        Ordered     enoxaparin injection 40 mg  Daily      12/25/18 1528     IP VTE HIGH RISK PATIENT  Once      12/24/18 1712     Place HUGH hose  Until discontinued      12/23/18 2159     Place sequential compression device  Until discontinued      12/23/18 2159              Sonia Sharma MD  Department of Hospital Medicine   [facility]

## 2019-01-06 NOTE — PROGRESS NOTES
[facility] Hospital Medicine  Progress Note    Patient Name: Khanh Galvan  MRN: [id number]  Patient Class: IP- Inpatient   Admission Date: 12/23/2018  Length of Stay: 14 days  Attending Physician: Sonia Sharma MD  Primary Care Provider: JONY Pena        Subjective:     Principal Problem:Subacute osteomyelitis of right foot    HPI:    Khanh Galvan is a 58 y.o. male that (in part)  has a past medical history of Diabetes mellitus, Hypertension, and Subacute osteomyelitis.  has a past surgical history that includes left foot. Presents to [facility] Emergency Department by EMS who was called for fatigue.  When EMS arrived the patient was lethargic and nearly attended.  He was found to be hyperglycemic and hypotensive.  IV fluids were initiated and mental status improved along with his blood pressure.  He was found to have a right-sided diabetic ft lesion.  He reports sustaining injury from stubbing his right great toe on the stairs.  He developed a painful, red, swollen, and warm lesion to his right great toe that has worsened over the last 3 days.  Unfortunately the patient is a poor historian and history is limited.      In the emergency department routine laboratory studies were obtained and x-ray the right foot was performed.  He had elevated ESR, CRP, and physical exam was consistent with diabetic foot lesion with osteomyelitis.  He was given IV fluids and started on empiric antibiotic therapy due to associated cellulitis and concern for possible bacteremia with systemic infection causing infectious encephalopathy.    Hospital medicine has been asked to admit for further evaluation and treatment.     Hospital Course:  Mr Galvan presented with subacute osteomyelitis of right big toe. Podiatry consulted. Underwent partial amputation of right toe. Biopsy of amputated bone showed moderate Klebsiella (ESBL), many Strep Group B and few Hafnia alvei. ID  "consulted. Abx tailored to ertapenem. Per son, patient lives with him, his wife and a baby who is a heart patient and were concerned about baby being exposed to bacteria. SNF was planned. Biopsy of proximal bone resulted as "Viable bone exhibiting no inflammatory infiltrates". Discussed with ID who recommended no further abx treatment, just wound care. Patient can go home with HH. Has a walker at home. Family requested another day as they live very far and need to plan ahead to pick him up. Patient is in agreement with assisted NH if needed. Will discuss with SW.     Await assisted NH placement     No new subjective & objective note has been filed under this hospital service since the last note was generated.    Assessment/Plan:      * Subacute osteomyelitis of right foot    POD 6 partial amputation right toe  Is clinically stable  BG close to goal. Adjusting insulin as needed for goal BG <180  BCx NGTD. Bone Cx with moderate Klebsiella ESBL, many Strep group B and Hafnia alvei   Pathology of proximal bone showed "Viable bone exhibiting no inflammatory infiltrates"  Discussed with ID who recommends no further abx treatment, just wound care  Wound care per Podiatry         Acute osteomyelitis of toe, right    S/p I&D with partial amputation of right toe  Path report:  Part 1  Distal great toe (submitted as right distal great toe):  -Cutaneous ulceration and necrosis with intense acute inflammation extending into superficial  subcutaneous tissues  -Dermal fibrosis and chronic perivascular inflammation  -Ulceration is focally present at the cutaneous resection margin. Soft tissues and bone at margins appear  viable  Part 2  Segment of tubular bone (submitted as proximal margin right great toe):  -Viable bone exhibiting no inflammatory infiltrates  -Attached viable fibroadipose tissues without significant histopathologic abnormalities    No IV indicated   Continue wound care      Diabetic ulcer of right great toe    "        Sepsis           Morbid obesity due to excess calories    Spent > 5 minutes on weight loss education       Elevated lactic acid level    2/2 sepsis  Peripheral pulses normal with adequate resuscitation  Good appetite and PO intake       Type 2 diabetes mellitus with right diabetic foot infection    Poorly controlled  Adjusting insulin regimen for goal BG < 180    Increase basal and prandial doses       Cellulitis of right foot    With evidence of osteo  Management as above         VTE Risk Mitigation (From admission, onward)        Ordered     enoxaparin injection 40 mg  Daily      12/25/18 1528     IP VTE HIGH RISK PATIENT  Once      12/24/18 1712     Place HUGH hose  Until discontinued      12/23/18 2159     Place sequential compression device  Until discontinued      12/23/18 2159              Sonia Sharma MD  Department of Hospital Medicine   [facility]

## 2019-01-07 LAB
POCT GLUCOSE: 126 MG/DL (ref 70–110)
POCT GLUCOSE: 167 MG/DL (ref 70–110)
POCT GLUCOSE: 220 MG/DL (ref 70–110)
POCT GLUCOSE: 230 MG/DL (ref 70–110)

## 2019-01-07 PROCEDURE — S5571 INSULIN DISPOS PEN 3 ML: HCPCS | Performed by: HOSPITALIST

## 2019-01-07 PROCEDURE — 25000003 PHARM REV CODE 250: Performed by: INTERNAL MEDICINE

## 2019-01-07 PROCEDURE — 97110 THERAPEUTIC EXERCISES: CPT

## 2019-01-07 PROCEDURE — 11000001 HC ACUTE MED/SURG PRIVATE ROOM

## 2019-01-07 PROCEDURE — 97116 GAIT TRAINING THERAPY: CPT

## 2019-01-07 PROCEDURE — 63600175 PHARM REV CODE 636 W HCPCS: Performed by: HOSPITALIST

## 2019-01-07 PROCEDURE — 63600175 PHARM REV CODE 636 W HCPCS: Performed by: INTERNAL MEDICINE

## 2019-01-07 RX ADMIN — INSULIN DETEMIR 35 UNITS: 100 INJECTION, SOLUTION SUBCUTANEOUS at 08:01

## 2019-01-07 RX ADMIN — INSULIN ASPART 4 UNITS: 100 INJECTION, SOLUTION INTRAVENOUS; SUBCUTANEOUS at 12:01

## 2019-01-07 RX ADMIN — INSULIN ASPART 12 UNITS: 100 INJECTION, SOLUTION INTRAVENOUS; SUBCUTANEOUS at 12:01

## 2019-01-07 RX ADMIN — ENOXAPARIN SODIUM 40 MG: 100 INJECTION SUBCUTANEOUS at 04:01

## 2019-01-07 RX ADMIN — INSULIN ASPART 4 UNITS: 100 INJECTION, SOLUTION INTRAVENOUS; SUBCUTANEOUS at 04:01

## 2019-01-07 RX ADMIN — LISINOPRIL 40 MG: 20 TABLET ORAL at 08:01

## 2019-01-07 RX ADMIN — INSULIN ASPART 12 UNITS: 100 INJECTION, SOLUTION INTRAVENOUS; SUBCUTANEOUS at 08:01

## 2019-01-07 RX ADMIN — INSULIN ASPART 12 UNITS: 100 INJECTION, SOLUTION INTRAVENOUS; SUBCUTANEOUS at 04:01

## 2019-01-07 NOTE — PT/OT/SLP PROGRESS
Physical Therapy Treatment    Patient Name:  Khanh Galvan   MRN:  [id number]    Recommendations:     Discharge Recommendations:  (PT at next level of care)   Discharge Equipment Recommendations: none   Barriers to discharge: None    Assessment:     Khanh Galvan is a 58 y.o. male admitted with a medical diagnosis of Subacute osteomyelitis of right foot.  He presents with the following impairments/functional limitations:  weakness, impaired functional mobilty, gait instability, impaired balance, decreased lower extremity function, decreased safety awareness, pain, edema, orthopedic precautions, impaired skin.    Rehab Prognosis: Good; patient would benefit from acute skilled PT services to address these deficits and reach maximum level of function.    Recent Surgery: Procedure(s) (LRB):  INCISION AND DRAINAGE (Right)  AMPUTATION, TOE PARTIAL RIGHT GREAT TOE (Right) 14 Days Post-Op    Plan:     During this hospitalization, patient to be seen 3 x/week to address the identified rehab impairments via gait training, therapeutic activities, therapeutic exercises and progress toward the following goals:    · Plan of Care Expires:  01/18/19    Subjective     Chief Complaint: Tired of just laying around.   Patient/Family Comments/goals: To start walking more.   Pain/Comfort:  · Pain Rating 1: 0/10      Objective:     Communicated with nurse prior to session.  Patient found supine in bed telemetry  upon PT entry to room.     General Precautions: Standard, fall   Orthopedic Precautions:(heel touch weight bearing to R LE)   Braces: (CAM boot to right foot)     Functional Mobility:  · Bed Mobility:     · Supine to Sit: supervision  · Transfers:     · Sit to Stand:  stand by assistance with rolling walker  · Gait:  Patient ambulated 100ft with Rolling Walker, heel touch weight bearing, and SBA using 3-point gait. Patient demonstrated decreased ajay, decreased velocity of limb motion and decreased step length during gait due to  impaired balance.    AM-PAC 6 CLICK MOBILITY  Turning over in bed (including adjusting bedclothes, sheets and blankets)?: 4  Sitting down on and standing up from a chair with arms (e.g., wheelchair, bedside commode, etc.): 4  Moving from lying on back to sitting on the side of the bed?: 4  Moving to and from a bed to a chair (including a wheelchair)?: 4  Need to walk in hospital room?: 3  Climbing 3-5 steps with a railing?: 3  Basic Mobility Total Score: 22     Therapeutic Activities and Exercises:  Patient performed B LE seated therex 2x15 for A/P,LAQ, hip flex, and hip abd/add.     Patient left reclined in bedside chair with all lines intact, call button in reach and nurse and PCT notified that patient is okay to ambulate in hallway with RW and nursing assistance. Both verbalized understanding.     GOALS:   Multidisciplinary Problems     Physical Therapy Goals        Problem: Physical Therapy Goal    Goal Priority Disciplines Outcome Goal Variances Interventions   Physical Therapy Goal     PT, PT/OT Ongoing (interventions implemented as appropriate)     Description:  Goals to be met by: 19    Patient will increase functional independence with mobility by performin. Supine to sit with supervision - met  2. Sit to stand transfer with supervision - met  3. Gait x100 feet with supervision using Rolling Walker  4. Lower extremity exercise program x30 reps per handout, with supervision                       Time Tracking:     PT Received On: 19  PT Start Time: 1113     PT Stop Time: 1140  PT Total Time (min): 27 min     Billable Minutes: Gait Training 15 and Therapeutic Exercise  12    Treatment Type: Treatment  PT/PTA: PT     PTA Visit Number: 0     Shayna Jeffers, PT  2019

## 2019-01-07 NOTE — PLAN OF CARE
Problem: Occupational Therapy Goal  Goal: Occupational Therapy Goal  Goals to be met by: 1/10/18    Patient will increase functional independence with ADLs by performing:    UE Dressing with Stand-by Assistance.  LE Dressing with Stand-by Assistance.  Grooming while standing at sink with Stand-by Assistance.  Supine to sit with Stand-by Assistance.  Step transfer with Stand-by Assistance  Toilet transfer to toilet with Stand-by Assistance.  Upper extremity exercise program x15 reps per handout, with assistance as needed. Met 12/28/18    Outcome: Ongoing (interventions implemented as appropriate)  The patient tolerated UE therex using green theraband.x15 reps per handout. The patient refused to amb to the sink or participate in self care tasks.    Comments: Patient will benefit from continued OT to address functional deficits.

## 2019-01-07 NOTE — PROGRESS NOTES
TN spoke with Bernardo at [facility]. Patient is clinically accepted however their clinical liaison is out of the office today and will not return until tomorrow to visit patient. Bernardo also stated they do not do single case agreements for SNF therefore patient will have to admit as a nursing home patient. Patient informed.

## 2019-01-07 NOTE — PT/OT/SLP PROGRESS
Occupational Therapy   Treatment    Name: Khanh Galvan  MRN: [id number]  Admitting Diagnosis:  Subacute osteomyelitis of right foot  14 Days Post-Op    Recommendations:     Discharge Recommendations: (OT at the next level of care and 24* (S))  Discharge Equipment Recommendations:  none  Barriers to discharge:  None    Subjective     Pain/Comfort:  · Pain Rating 1: 0/10    Objective:     Communicated with: patient prior to session.  Patient found seated in the chiar (reclined) with  bed alarm upon OT entry to room.    General Precautions: Standard, fall   Orthopedic Precautions:(heeltouch weight bearing to the Right heel)   Braces: (CAM boot to the RLE)     Occupational Performance:    Bed Mobility:    · N/T     Functional Mobility/Transfers:  · Functional Mobility: The patient was (D) to move from reclining to sitting upright in the chair. The patient required CGA to reposition in the chair.    Activities of Daily Living:  · N/T      [facility] 6 Click ADL:      Treatment & Education:  Pt performed green T-band UE therex/HEP to BUE per handout. The patient performed (B) shldr horiz abd, B shldr flex B shldr abd and (B) elbow ext  and B elbow flex X15 reps.equired demo prior to completion of each exercise.    Patient left up in chair with all lines intact and call button in reach  Education:    Assessment:     Khanh Galvan is a 58 y.o. male with a medical diagnosis of Subacute osteomyelitis of right foot.  He presents with the following performance deficits affecting function are weakness, impaired endurance, impaired self care skills, impaired functional mobilty, gait instability, impaired balance, decreased upper extremity function, decreased lower extremity function, orthopedic precautions, impaired skin, decreased safety awareness, edema. The patient demo good tolerance for UE HEP using green theraband.     Rehab Prognosis:  Good; patient would benefit from acute skilled OT services to address these deficits and  reach maximum level of function.       Plan:     Patient to be seen 5 x/week to address the above listed problems via self-care/home management, therapeutic activities, therapeutic exercises  · Plan of Care Expires: 01/10/19  · Plan of Care Reviewed with: patient    This Plan of care has been discussed with the patient who was involved in its development and understands and is in agreement with the identified goals and treatment plan    GOALS:   Multidisciplinary Problems     Occupational Therapy Goals        Problem: Occupational Therapy Goal    Goal Priority Disciplines Outcome Interventions   Occupational Therapy Goal     OT, PT/OT Ongoing (interventions implemented as appropriate)    Description:  Goals to be met by: 1/10/18    Patient will increase functional independence with ADLs by performing:    UE Dressing with Stand-by Assistance.  LE Dressing with Stand-by Assistance.  Grooming while standing at sink with Stand-by Assistance.  Supine to sit with Stand-by Assistance.  Step transfer with Stand-by Assistance  Toilet transfer to toilet with Stand-by Assistance.  Upper extremity exercise program x15 reps per handout, with assistance as needed. Met 12/28/18                     Time Tracking:     OT Date of Treatment: 01/07/19  OT Start Time: 1503  OT Stop Time: 1516  OT Total Time (min): 13 min    Billable Minutes:Therapeutic Exercise 13    Claudia Augustine OT  1/7/2019

## 2019-01-07 NOTE — PLAN OF CARE
Problem: Adult Inpatient Plan of Care  Goal: Plan of Care Review  Outcome: Ongoing (interventions implemented as appropriate)  Pt did very well overnight. Bed alarm active, SR x 3. RLE elevated, dressing CDI, boot in place. NSR on telemetry. UO WDL. No IV per MD order at this time. Slept well. No falls/injuries or new skin breakdown noted this shift. Pending placement at SNF. No acute events overnight.

## 2019-01-07 NOTE — PHYSICIAN QUERY
PT Name: Khanh Galvan  MR #: [id number]     Physician Query Form - Documentation Clarification      CDS: Ailyn Monsivais RN   Contact information:[email or web address]    This form is a permanent document in the medical record.     Query Date: January 7, 2019    By submitting this query, we are merely seeking further clarification of documentation. Please utilize your independent clinical judgment when addressing the question(s) below.    The Medical record reflects the following:    Supporting Clinical Findings Location in Medical Record   KLEBSIELLA PNEUMONIAE ESBL  STREPTOCOCCUS AGALACTIAE (GROUP B)  GRAM NEGATIVE LILIANA, NON-LACTOSE    Wound Culture 12/24   [x  ] Due to or associated with each other    [  ] KLEBSIELLA PNEUMONIAE ESBL    [  ] STREPTOCOCCUS AGALACTIAE (GROUP B)    [  ] GRAM NEGATIVE LILIANA, NON-LACTOSE    Query Answer 12/29   Sepsis secondary to cellulitis and osteomyelitis the right lower extremity  Complications of subacute diabetic ft ulceration.  As evidenced by history, physical exam, imaging, and elevated CRP/ESR  Previous infection with diabetic foot ulcer. May be polymicrobial given the location and nature of the infection.  Blood cultures will likely be of low yield.    Ideally a bone biopsy would be obtained prior to initiating antibiotics, however she was given empiric antibiotics in the ED so we will continue given his systemic symptoms.  Will tailor antibiotic regimen according to culture & sensitivity results, should a bone biopsy be performed H&P 12/24                                                                            Doctor, Please check off the specific organisms that caused sepsis.        Provider Use Only      Select All That Apply:      [ x ] KLEBSIELLA PNEUMONIAE ESBL      [ x ] STREPTOCOCCUS AGALACTIAE (GROUP B)      [ x ] GRAM NEGATIVE LILIANA, NON-LACTOSE       [  ] Other:____________________________                                                                                                                [  ] Clinically Undetermined

## 2019-01-07 NOTE — SUBJECTIVE & OBJECTIVE
Interval History: pt has no new complaints    Review of Systems   Constitutional: Negative.    Respiratory: Negative.    Cardiovascular: Negative.    Gastrointestinal: Negative.      Objective:     Vital Signs (Most Recent):  Temp: 98.3 °F (36.8 °C) (01/07/19 1140)  Pulse: 73 (01/07/19 1140)  Resp: 18 (01/07/19 1140)  BP: 130/87 (01/07/19 1140)  SpO2: 98 % (01/07/19 1140) Vital Signs (24h Range):  Temp:  [98.1 °F (36.7 °C)-99 °F (37.2 °C)] 98.3 °F (36.8 °C)  Pulse:  [67-77] 73  Resp:  [18] 18  SpO2:  [93 %-98 %] 98 %  BP: (130-160)/(81-94) 130/87     Weight: 120.8 kg (266 lb 6.4 oz)  Body mass index is 37.16 kg/m².    Intake/Output Summary (Last 24 hours) at 1/7/2019 1447  Last data filed at 1/7/2019 1219  Gross per 24 hour   Intake 720 ml   Output 1475 ml   Net -755 ml      Physical Exam   Constitutional: He is oriented to person, place, and time. He appears well-developed. No distress.   Cardiovascular: Normal rate, regular rhythm, normal heart sounds and intact distal pulses.   Pulmonary/Chest: Effort normal and breath sounds normal.   Abdominal: Soft. Bowel sounds are normal.   Neurological: He is alert and oriented to person, place, and time.   Skin: He is not diaphoretic.   Right foot wrapped   Nursing note and vitals reviewed.      Significant Labs:   BMP: No results for input(s): GLU, NA, K, CL, CO2, BUN, CREATININE, CALCIUM, MG in the last 48 hours.  CBC: No results for input(s): WBC, HGB, HCT, PLT in the last 48 hours.  POCT Glucose:   Recent Labs   Lab 01/06/19  1159 01/06/19  1634 01/07/19  0753   POCTGLUCOSE 138* 237* 167*       Significant Imaging: I have reviewed and interpreted all pertinent imaging results/findings within the past 24 hours.

## 2019-01-07 NOTE — PLAN OF CARE
Problem: Adult Inpatient Plan of Care  Goal: Plan of Care Review  Outcome: Ongoing (interventions implemented as appropriate)  Pt able to address needs, bg monitoring and SSI prn, safety maintained, OOBTC this shift, PT following, pt awaiting placement to [facility] possibly, ambulated down the long w/RW x1 this shift, 0 c/o pain this is shift, bed low locked and in position, will cont plan of care

## 2019-01-07 NOTE — PLAN OF CARE
Problem: Physical Therapy Goal  Goal: Physical Therapy Goal  Goals to be met by: 19    Patient will increase functional independence with mobility by performin. Supine to sit with supervision - met  2. Sit to stand transfer with supervision - met  3. Gait x100 feet with supervision using Rolling Walker  4. Lower extremity exercise program x30 reps per handout, with supervision      Outcome: Ongoing (interventions implemented as appropriate)    Patient increased gait distance.

## 2019-01-07 NOTE — ASSESSMENT & PLAN NOTE
"s/p partial amputation right toe 12/24  Is clinically stable  BG close to goal. Adjusting insulin as needed for goal BG <180  BCx NGTD. Bone Cx with moderate Klebsiella ESBL, many Strep group B and Hafnia alvei   Pathology of proximal bone showed "Viable bone exhibiting no inflammatory infiltrates"  Discussed with ID who recommends no further abx treatment, just wound care  Wound care per Podiatry      "

## 2019-01-07 NOTE — PLAN OF CARE
TN contacted Murphy at [facility] to inquire if they are willing to accept patient. Murphy stated she faxed documentation to patient's insurance stating they are not accepting patient.        01/07/19 1155   Discharge Reassessment   Assessment Type Discharge Planning Reassessment   Provided patient/caregiver education on the expected discharge date and the discharge plan Yes   Do you have any problems affording any of your prescribed medications? Yes   Discharge Plan A Skilled Nursing Facility   Discharge Plan B New Nursing Home placement - senior living care facility   Anticipated Discharge Disposition [facility]   Can the patient answer the patient profile reliably? Yes, cognitively intact   How does the patient rate their overall health at the present time? Good   Describe the patient's ability to walk at the present time. Minor restrictions or changes   How often would a person be available to care for the patient? Often   Number of comorbid conditions (as recorded on the chart) Two   Post-Acute Status   Post-Acute Authorization Placement   Post-Acute Placement Status Pending Payor Review

## 2019-01-08 PROBLEM — A41.9 SEPSIS DUE TO CELLULITIS: Status: RESOLVED | Noted: 2018-12-23 | Resolved: 2019-01-08

## 2019-01-08 PROBLEM — L03.90 SEPSIS DUE TO CELLULITIS: Status: RESOLVED | Noted: 2018-12-23 | Resolved: 2019-01-08

## 2019-01-08 LAB
POCT GLUCOSE: 158 MG/DL (ref 70–110)
POCT GLUCOSE: 164 MG/DL (ref 70–110)
POCT GLUCOSE: 205 MG/DL (ref 70–110)
POCT GLUCOSE: 237 MG/DL (ref 70–110)

## 2019-01-08 PROCEDURE — 11000001 HC ACUTE MED/SURG PRIVATE ROOM

## 2019-01-08 PROCEDURE — 99024 POSTOP FOLLOW-UP VISIT: CPT | Mod: ,,, | Performed by: PODIATRIST

## 2019-01-08 PROCEDURE — 63600175 PHARM REV CODE 636 W HCPCS: Performed by: INTERNAL MEDICINE

## 2019-01-08 PROCEDURE — 25000003 PHARM REV CODE 250: Performed by: INTERNAL MEDICINE

## 2019-01-08 PROCEDURE — 99024 PR POST-OP FOLLOW-UP VISIT: ICD-10-PCS | Mod: ,,, | Performed by: PODIATRIST

## 2019-01-08 RX ORDER — INSULIN GLARGINE 100 [IU]/ML
40 INJECTION, SOLUTION SUBCUTANEOUS DAILY
Qty: 36 ML | Refills: 3 | Status: SHIPPED | OUTPATIENT
Start: 2019-01-08 | End: 2023-04-22 | Stop reason: CLARIF

## 2019-01-08 RX ORDER — INSULIN ASPART 100 [IU]/ML
13 INJECTION, SOLUTION INTRAVENOUS; SUBCUTANEOUS
Status: DISCONTINUED | OUTPATIENT
Start: 2019-01-08 | End: 2019-01-09 | Stop reason: HOSPADM

## 2019-01-08 RX ADMIN — INSULIN DETEMIR 35 UNITS: 100 INJECTION, SOLUTION SUBCUTANEOUS at 08:01

## 2019-01-08 RX ADMIN — ENOXAPARIN SODIUM 40 MG: 100 INJECTION SUBCUTANEOUS at 04:01

## 2019-01-08 RX ADMIN — INSULIN ASPART 13 UNITS: 100 INJECTION, SOLUTION INTRAVENOUS; SUBCUTANEOUS at 04:01

## 2019-01-08 RX ADMIN — LISINOPRIL 40 MG: 20 TABLET ORAL at 08:01

## 2019-01-08 RX ADMIN — INSULIN ASPART 12 UNITS: 100 INJECTION, SOLUTION INTRAVENOUS; SUBCUTANEOUS at 08:01

## 2019-01-08 RX ADMIN — INSULIN ASPART 2 UNITS: 100 INJECTION, SOLUTION INTRAVENOUS; SUBCUTANEOUS at 04:01

## 2019-01-08 RX ADMIN — INSULIN ASPART 4 UNITS: 100 INJECTION, SOLUTION INTRAVENOUS; SUBCUTANEOUS at 08:01

## 2019-01-08 RX ADMIN — INSULIN ASPART 4 UNITS: 100 INJECTION, SOLUTION INTRAVENOUS; SUBCUTANEOUS at 12:01

## 2019-01-08 RX ADMIN — INSULIN ASPART 12 UNITS: 100 INJECTION, SOLUTION INTRAVENOUS; SUBCUTANEOUS at 12:01

## 2019-01-08 NOTE — ASSESSMENT & PLAN NOTE
S/p I&D with partial amputation of right toe  Path report:  Part 1  Distal great toe (submitted as right distal great toe):  -Cutaneous ulceration and necrosis with intense acute inflammation extending into superficial  subcutaneous tissues  -Dermal fibrosis and chronic perivascular inflammation  -Ulceration is focally present at the cutaneous resection margin. Soft tissues and bone at margins appear  viable  Part 2  Segment of tubular bone (submitted as proximal margin right great toe):  -Viable bone exhibiting no inflammatory infiltrates  -Attached viable fibroadipose tissues without significant histopathologic abnormalities    No IV antibiotics indicated   Continue wound care

## 2019-01-08 NOTE — PROGRESS NOTES
[facility] Hospital Medicine  Progress Note    Patient Name: Khanh Galvan  MRN: [id number]  Patient Class: IP- Inpatient   Admission Date: 12/23/2018  Length of Stay: 16 days  Attending Physician: Jessica Garcia MD  Primary Care Provider: JONY Pena        Subjective:     Principal Problem:Subacute osteomyelitis of right foot    HPI:    Khanh Galvan is a 58 y.o. male that (in part)  has a past medical history of Diabetes mellitus, Hypertension, and Subacute osteomyelitis.  has a past surgical history that includes left foot. Presents to [facility] Emergency [facility] by EMS who was called for fatigue.  When EMS arrived the patient was lethargic and nearly attended.  He was found to be hyperglycemic and hypotensive.  IV fluids were initiated and mental status improved along with his blood pressure.  He was found to have a right-sided diabetic ft lesion.  He reports sustaining injury from stubbing his right great toe on the stairs.  He developed a painful, red, swollen, and warm lesion to his right great toe that has worsened over the last 3 days.  Unfortunately the patient is a poor historian and history is limited.      In the emergency department routine laboratory studies were obtained and x-ray the right foot was performed.  He had elevated ESR, CRP, and physical exam was consistent with diabetic foot lesion with osteomyelitis.  He was given IV fluids and started on empiric antibiotic therapy due to associated cellulitis and concern for possible bacteremia with systemic infection causing infectious encephalopathy.    Hospital medicine has been asked to admit for further evaluation and treatment.     Hospital Course:  Mr Galvan presented with subacute osteomyelitis of right big toe. Podiatry consulted. Underwent partial amputation of right toe. Biopsy of amputated bone showed moderate Klebsiella (ESBL), many Strep Group B and few Hafnia alvei. ID  "consulted. Abx tailored to ertapenem. Per son, patient lives with him, his wife and a baby who is a heart patient and were concerned about baby being exposed to bacteria. SNF was planned. Biopsy of proximal bone resulted as "Viable bone exhibiting no inflammatory infiltrates". Discussed with ID who recommended no further abx treatment, just wound care. Patient can go home with HH. Has a walker at home. Family requested another day as they live very far and need to plan ahead to pick him up. Patient is in agreement with [facility] if needed. Will discuss with SW.     Await [facility] placement. Patient has been accepted to [facility].     Interval History: No complaints. Awaiting discharge.     Review of Systems   Respiratory: Negative for cough and shortness of breath.    Cardiovascular: Negative for chest pain.   Gastrointestinal: Negative for abdominal pain, constipation, diarrhea, nausea and vomiting.   Genitourinary: Negative for difficulty urinating.     Objective:     Vital Signs (Most Recent):  Temp: 98.1 °F (36.7 °C) (01/08/19 1156)  Pulse: 71 (01/08/19 1156)  Resp: 18 (01/08/19 1156)  BP: 128/88 (01/08/19 1156)  SpO2: 99 % (01/08/19 1156) Vital Signs (24h Range):  Temp:  [98.1 °F (36.7 °C)-99 °F (37.2 °C)] 98.1 °F (36.7 °C)  Pulse:  [71-78] 71  Resp:  [18-20] 18  SpO2:  [95 %-99 %] 99 %  BP: (114-163)/(57-98) 128/88     Weight: 120.8 kg (266 lb 6.4 oz)  Body mass index is 37.16 kg/m².    Intake/Output Summary (Last 24 hours) at 1/8/2019 1448  Last data filed at 1/8/2019 1324  Gross per 24 hour   Intake 480 ml   Output 750 ml   Net -270 ml      Physical Exam   Constitutional: He appears well-developed and well-nourished. No distress.   HENT:   Head: Normocephalic and atraumatic.   Mouth/Throat: Oropharynx is clear and moist.   Cardiovascular: Normal rate, regular rhythm, normal heart sounds and intact distal pulses. Exam reveals no gallop and no friction rub.   No murmur heard.  Pulmonary/Chest: " "Effort normal and breath sounds normal. No stridor. No respiratory distress. He has no wheezes. He has no rales.   Abdominal: Soft. Bowel sounds are normal. He exhibits no distension and no mass. There is no tenderness. There is no guarding.   Musculoskeletal: He exhibits deformity (right foot bandaged).   Neurological: He is alert.   Skin: Skin is warm and dry. He is not diaphoretic.   Nursing note and vitals reviewed.      Significant Labs: All pertinent labs within the past 24 hours have been reviewed.    Significant Imaging: I have reviewed and interpreted all pertinent imaging results/findings within the past 24 hours.    Assessment/Plan:      * Subacute osteomyelitis of right foot    s/p partial amputation right toe 12/24  Is clinically stable  BG close to goal. Adjusting insulin as needed for goal BG <180  BCx NGTD. Bone Cx with moderate Klebsiella ESBL, many Strep group B and Hafnia alvei   Pathology of proximal bone showed "Viable bone exhibiting no inflammatory infiltrates"  Discussed with ID who recommends no further abx treatment, just wound care  Wound care per Podiatry         Acute osteomyelitis of toe, right    S/p I&D with partial amputation of right toe  Path report:  Part 1  Distal great toe (submitted as right distal great toe):  -Cutaneous ulceration and necrosis with intense acute inflammation extending into superficial  subcutaneous tissues  -Dermal fibrosis and chronic perivascular inflammation  -Ulceration is focally present at the cutaneous resection margin. Soft tissues and bone at margins appear  viable  Part 2  Segment of tubular bone (submitted as proximal margin right great toe):  -Viable bone exhibiting no inflammatory infiltrates  -Attached viable fibroadipose tissues without significant histopathologic abnormalities    No IV antibiotics indicated   Continue wound care      Diabetic ulcer of right great toe    See  above       Sepsis    Resolved        Morbid obesity due to excess " calories    Spent > 5 minutes on weight loss education       Elevated lactic acid level    2/2 sepsis  Peripheral pulses normal with adequate resuscitation  Good appetite and PO intake       Type 2 diabetes mellitus with right diabetic foot infection    Poorly controlled  Adjusting insulin regimen for goal BG < 180    Increase basal and prandial doses       Cellulitis of right foot    With evidence of osteo  Management as above  Antibiotics dc'd         VTE Risk Mitigation (From admission, onward)        Ordered     enoxaparin injection 40 mg  Daily      12/25/18 1528     IP VTE HIGH RISK PATIENT  Once      12/24/18 1712     Place HUGH hose  Until discontinued      12/23/18 2159     Place sequential compression device  Until discontinued      12/23/18 2159              Jessica Garcia MD  Department of Hospital Medicine   [facility]

## 2019-01-08 NOTE — NURSING
Emmett Galvan MRN [id number] with tele box #[id number] confirmed and verified on tele box and monitor with off going nurse.

## 2019-01-08 NOTE — PROGRESS NOTES
Woundcare clinic appointment scheduled.      Follow-up Information     [facility] On 1/16/2019.    Why:  On Wednesday on 9:40am, outpatient services   Please go to 1st floor Patient Registration

## 2019-01-08 NOTE — PLAN OF CARE
[facility]     NURSING HOME ORDERS    01/09/2019      Admit to Nursing Home:  Regular Bed       Diagnoses:  Active Hospital Problems    Diagnosis  POA    *Subacute osteomyelitis of right foot [M86.271]  Yes    Morbid obesity due to excess calories [E66.01]  Yes     Body mass index is 37.16 kg/m².        Sepsis [A41.9]  Yes    Diabetic ulcer of right great toe [E11.621, L97.519]  Yes    Acute osteomyelitis of toe, right [M86.171]  Yes    Elevated lactic acid level [R79.89]  Yes    Cellulitis of right foot [L03.115]  Yes    Type 2 diabetes mellitus with right diabetic foot infection [E11.628, L08.9]  Yes      Resolved Hospital Problems   No resolved problems to display.       Patient is homebound due to:  Subacute osteomyelitis of right foot    Allergies:Review of patient's allergies indicates:  No Known Allergies    Vitals: Once daily    Diet: Diabetic   Supplement:  1 can every three times a day with meals                         Type: Glucerna       Acitivities:     - Up in a chair each morning as tolerated   - Ambulate with assistance to bathroom   - May use walker or self-propelled wheelchair   - Weight bearing: Partial- heel weight bearing with CAM boot, limited ambulation    Nursing Precautions:    - Aspiration precautions:             - Assistance with meals            -  Upright 90 degrees befor during and after meals             -  Suction at bedside          - Fall precautions per nursing home protocol   - Seizure precaution per prison protocol   - Decubitus precautions:        -  for positioning   - Pressure reducing foam mattress   - Turn patient every two hours. Use wedge pillows to anchor patient      MISCELLANEOUS CARE:          Routine Skin for Bedridden Patients:  Apply moisture barrier cream to all    skin folds and wet areas in perineal area daily and after baths and                           all bowel movements.     Wound Care: Leave right foot dressing in place  until Podiatry follow up.              DIABETES CARE:       Check blood sugar:     Fingerstick blood sugar AC and HS      Report CBG < 60 or > 400 to physician.                                          Insulin Sliding Scale          Glucose  Novolog Insulin Subcutaneous        0 - 60   Orange juice or glucose tablet, hold insulin      No insulin   201-250  2 units   251-300  4 units   301-350  6 units   351-400  8 units   >400   10 units then call physician      Medications: Discontinue all previous medication orders, if any. See new list below.     Khanh Galvan   Home Medication Instructions JESICA:[id number]    Printed on:01/08/19 1433   Medication Information                      gabapentin (NEURONTIN) 300 MG capsule  Take 300 mg by mouth 3 (three) times daily.             insulin (BASAGLAR KWIKPEN U-100 INSULIN) glargine 100 units/mL (3mL) SubQ pen  Inject 40 Units into the skin once daily.             insulin aspart U-100 (NOVOLOG) 100 unit/mL InPn pen  Inject 10 Units into the skin 3 (three) times daily.             lisinopril (PRINIVIL,ZESTRIL) 20 MG tablet  Take 20 mg by mouth once daily.             polyethylene glycol (GLYCOLAX) 17 gram PwPk  Take 17 g by mouth once daily.             tamsulosin (FLOMAX) 0.4 mg Cap  Take 0.4 mg by mouth once daily.                 _________________________________  Jessica Garcia MD  01/09/2019

## 2019-01-08 NOTE — PROGRESS NOTES
[facility]  Podiatry  Progress Note    Patient Name: Khanh Galvan  MRN: [id number]  Admission Date: 12/23/2018  Hospital Length of Stay: 16 days  Attending Physician: Sonia Sharma MD  Primary Care Provider: JONY Pena     Subjective:     Interval History:  Patient resting comfortably.  No new pedal complaints, no acute events overnight.  Awaiting placement    Follow-up For: Procedure(s) (LRB):  INCISION AND DRAINAGE (Right)  AMPUTATION, TOE PARTIAL RIGHT GREAT TOE (Right)    Post-Operative Day: 14 Days Post-Op    Scheduled Meds:   enoxaparin  40 mg Subcutaneous Daily    insulin aspart U-100  12 Units Subcutaneous TIDWM    insulin detemir U-100  35 Units Subcutaneous Daily    lisinopril  40 mg Oral Daily     Continuous Infusions:  PRN Meds:acetaminophen, dextrose 50%, glucagon (human recombinant), hydrALAZINE, influenza, insulin aspart U-100, ondansetron, pneumoc 13-dutch conj-dip cr(PF), traMADol    Review of Systems   Constitutional: Positive for fatigue. Negative for activity change, appetite change, chills and fever.   Respiratory: Negative for cough and shortness of breath.    Cardiovascular: Negative for chest pain and leg swelling.   Gastrointestinal: Negative for diarrhea, nausea and vomiting.   Musculoskeletal: Positive for gait problem and myalgias.   Skin: Positive for wound.   Neurological: Positive for weakness and numbness.        + paresthesia      Objective:     Vital Signs (Most Recent):  Temp: 98.4 °F (36.9 °C) (01/08/19 0352)  Pulse: 76 (01/08/19 0352)  Resp: 20 (01/08/19 0352)  BP: (!) 163/94 (01/08/19 0352)  SpO2: 99 % (01/08/19 0352) Vital Signs (24h Range):  Temp:  [98.1 °F (36.7 °C)-99 °F (37.2 °C)] 98.4 °F (36.9 °C)  Pulse:  [73-78] 76  Resp:  [18-20] 20  SpO2:  [95 %-99 %] 99 %  BP: (114-163)/(57-98) 163/94     Weight: 120.8 kg (266 lb 6.4 oz)  Body mass index is 37.16 kg/m².    Foot Exam    General: Pt. is well-developed, well-nourished, appears stated  age, in no acute distress, alert and oriented x 3. No evidence of depression, anxiety, or agitation. Calm, cooperative, and communicative. Appropriate interactions and affect.    Surgical Site  Incision: area of eschar to incision line when sutures removed is actually an are of dehiscence.  Granular base, bleeding.  Signs of infection: none  Drainage: none      01/08 01/03            Laboratory:  CBC: No results for input(s): WBC, RBC, HGB, HCT, PLT, MCV, MCH, MCHC in the last 168 hours.  CMP: No results for input(s): GLU, CALCIUM, ALBUMIN, PROT, NA, K, CO2, CL, BUN, CREATININE, ALKPHOS, ALT, AST, BILITOT in the last 168 hours.  Microbiology Results (last 7 days)     ** No results found for the last 168 hours. **        Specimen (12h ago, onward)    None          Diagnostic Results:  Imaging Results          X-Ray Foot Complete Right (Final result)  Result time 12/23/18 19:53:33    Final result by Rain Delgado MD (12/23/18 19:53:33)                 Impression:      See above.      Electronically signed by: Rain Delgado MD  Date:    12/23/2018  Time:    19:53             Narrative:    EXAMINATION:  XR FOOT COMPLETE 3 VIEW RIGHT    CLINICAL HISTORY:  . Osteomyelitis, unspecified    TECHNIQUE:  AP, lateral, and oblique views of the right foot were performed.    COMPARISON:  None.    FINDINGS:  No evidence of acute fracture or dislocation.  Small ulceration with suspected small amount of soft tissue air seen at the distal aspect of the great toe. No definite osseous erosive or destructive changes to suggest osteomyelitis.  Future MRI follow-up would be more sensitive for detection if clinical concern exists.                               X-Ray Chest AP Portable (Final result)  Result time 12/23/18 19:50:32    Final result by Rain Delgado MD (12/23/18 19:50:32)                 Impression:      Cardiomegaly with suspected mild pulmonary edema.      Electronically signed by: Rain Delgado  MD  Date:    12/23/2018  Time:    19:50             Narrative:    EXAMINATION:  XR CHEST AP PORTABLE    CLINICAL HISTORY:  Sepsis;    TECHNIQUE:  Single frontal view of the chest was performed.    COMPARISON:  03/02/2018.    FINDINGS:  Heart is enlarged but stable in size.  There is prominence of central pulmonary vasculature with mild perihilar opacity and diffuse increased interstitial attenuation most suggestive for mild pulmonary edema.  No evidence of focal consolidation, pneumothorax, or large effusion.  No acute osseous abnormality identified.                                  Assessment/Plan:     Active Diagnoses:    Diagnosis Date Noted POA    PRINCIPAL PROBLEM:  Subacute osteomyelitis of right foot [M86.271] 02/26/2018 Yes    Morbid obesity due to excess calories [E66.01] 12/24/2018 Yes    Sepsis [A41.9] 12/24/2018 Yes    Diabetic ulcer of right great toe [E11.621, L97.519]  Yes    Acute osteomyelitis of toe, right [M86.171]  Yes    Elevated lactic acid level [R79.89] 12/23/2018 Yes    Cellulitis of right foot [L03.115] 02/24/2018 Yes    Type 2 diabetes mellitus with right diabetic foot infection [E11.628, L08.9] 02/24/2018 Yes      Problems Resolved During this Admission:       POD #14 s/p  days right partial hallux amputation. Sutures removed.  No fluctuance noted and no purulent drainage noted.     Lateral 3/4th of incision dehisced.  Painted with betadine covered with adaptic, 4x4 gauze wrapped with conform, and coban.      Bandage can stay intact until Thursday, if patient is discharged before this time please contact me so that I can place a football wrap before discharge.    Proximal margin pathology clear      Partial heel WB right foot with CAM boot.      Ok for discharge from podiatry standpoint, placement pending secondary to domestic concerns with sick minor in the home.    Follow in wound clinic within 5 days of discharge, no home or facility dressing changes required    Cary Villanueva  DPALEJANDRO  Podiatry  [facility]

## 2019-01-08 NOTE — PROGRESS NOTES
TN spoke with Viry (patient's daughter in law). Viry is awaiting a call from her  to schedule appt to sign paperwork.

## 2019-01-08 NOTE — SUBJECTIVE & OBJECTIVE
Interval History: No complaints. Awaiting discharge.     Review of Systems   Respiratory: Negative for cough and shortness of breath.    Cardiovascular: Negative for chest pain.   Gastrointestinal: Negative for abdominal pain, constipation, diarrhea, nausea and vomiting.   Genitourinary: Negative for difficulty urinating.     Objective:     Vital Signs (Most Recent):  Temp: 98.1 °F (36.7 °C) (01/08/19 1156)  Pulse: 71 (01/08/19 1156)  Resp: 18 (01/08/19 1156)  BP: 128/88 (01/08/19 1156)  SpO2: 99 % (01/08/19 1156) Vital Signs (24h Range):  Temp:  [98.1 °F (36.7 °C)-99 °F (37.2 °C)] 98.1 °F (36.7 °C)  Pulse:  [71-78] 71  Resp:  [18-20] 18  SpO2:  [95 %-99 %] 99 %  BP: (114-163)/(57-98) 128/88     Weight: 120.8 kg (266 lb 6.4 oz)  Body mass index is 37.16 kg/m².    Intake/Output Summary (Last 24 hours) at 1/8/2019 1448  Last data filed at 1/8/2019 1324  Gross per 24 hour   Intake 480 ml   Output 750 ml   Net -270 ml      Physical Exam   Constitutional: He appears well-developed and well-nourished. No distress.   HENT:   Head: Normocephalic and atraumatic.   Mouth/Throat: Oropharynx is clear and moist.   Cardiovascular: Normal rate, regular rhythm, normal heart sounds and intact distal pulses. Exam reveals no gallop and no friction rub.   No murmur heard.  Pulmonary/Chest: Effort normal and breath sounds normal. No stridor. No respiratory distress. He has no wheezes. He has no rales.   Abdominal: Soft. Bowel sounds are normal. He exhibits no distension and no mass. There is no tenderness. There is no guarding.   Musculoskeletal: He exhibits deformity (right foot bandaged).   Neurological: He is alert.   Skin: Skin is warm and dry. He is not diaphoretic.   Nursing note and vitals reviewed.      Significant Labs: All pertinent labs within the past 24 hours have been reviewed.    Significant Imaging: I have reviewed and interpreted all pertinent imaging results/findings within the past 24 hours.

## 2019-01-08 NOTE — PLAN OF CARE
Problem: Adult Inpatient Plan of Care  Goal: Plan of Care Review  Outcome: Ongoing (interventions implemented as appropriate)   01/08/19 6599   Plan of Care Review   Plan of Care Reviewed With patient   Pt remains free of falls and injuries. Wound to RLE CDI with boot in place. BG WNL. Denies any pain. VSSAF. Awaiting SNF placement. Dressing change done by Dr. Villanueva this AM.

## 2019-01-08 NOTE — PROGRESS NOTES
1159- TN contacted Bernardo at [facility] to inquire if a final decision has been made.  Bernardo stated a rep just visited patient and that they will review for final decision. Awaiting call back.     1441-TN contacted patient's daughter Vilma to inform of patient's acceptance to [facility] and to inquire if she can sign paperwork today. No answer. Voice message left.     1443- TN contacted patient's daughter in law Viry. No answer. Voice message left.     1552- Call received from patient's daughter Vilma. Vilma stated she is not signing paperwork if it concerns financials and to contact patient's son and daughter in law Viry.

## 2019-01-09 VITALS
BODY MASS INDEX: 37.29 KG/M2 | TEMPERATURE: 98 F | OXYGEN SATURATION: 97 % | HEIGHT: 71 IN | SYSTOLIC BLOOD PRESSURE: 131 MMHG | WEIGHT: 266.38 LBS | DIASTOLIC BLOOD PRESSURE: 79 MMHG | HEART RATE: 76 BPM | RESPIRATION RATE: 18 BRPM

## 2019-01-09 PROBLEM — R79.89 ELEVATED LACTIC ACID LEVEL: Status: RESOLVED | Noted: 2018-12-23 | Resolved: 2019-01-09

## 2019-01-09 LAB
POCT GLUCOSE: 109 MG/DL (ref 70–110)
POCT GLUCOSE: 161 MG/DL (ref 70–110)
POCT GLUCOSE: 209 MG/DL (ref 70–110)

## 2019-01-09 PROCEDURE — 25000003 PHARM REV CODE 250: Performed by: INTERNAL MEDICINE

## 2019-01-09 PROCEDURE — 99024 POSTOP FOLLOW-UP VISIT: CPT | Mod: ,,, | Performed by: PODIATRIST

## 2019-01-09 PROCEDURE — 99024 PR POST-OP FOLLOW-UP VISIT: ICD-10-PCS | Mod: ,,, | Performed by: PODIATRIST

## 2019-01-09 RX ADMIN — INSULIN ASPART 13 UNITS: 100 INJECTION, SOLUTION INTRAVENOUS; SUBCUTANEOUS at 11:01

## 2019-01-09 RX ADMIN — INSULIN ASPART 13 UNITS: 100 INJECTION, SOLUTION INTRAVENOUS; SUBCUTANEOUS at 08:01

## 2019-01-09 RX ADMIN — INSULIN ASPART 2 UNITS: 100 INJECTION, SOLUTION INTRAVENOUS; SUBCUTANEOUS at 08:01

## 2019-01-09 RX ADMIN — LISINOPRIL 40 MG: 20 TABLET ORAL at 08:01

## 2019-01-09 RX ADMIN — INSULIN ASPART 4 UNITS: 100 INJECTION, SOLUTION INTRAVENOUS; SUBCUTANEOUS at 11:01

## 2019-01-09 NOTE — PROGRESS NOTES
TN contacted patient's daughter in law Viry multiple times to inquire if someone can sign paperwork at [facility] today. No answer. Voice message left.     TN met with patient to inquire if he can call his son to sign paperwork. Patient stated no one is answering the phones.     1237- TN contacted patient's daughter Vilma to obtain her brother's number. TN contacted patient's son Jb. No answer.  Voicemail not set up. Will attempt again.

## 2019-01-09 NOTE — UM SECONDARY REVIEW
[facility]    IP Extended Stay > 10     Plan to dc today as new longterm NH placement     LOS: approved an agreement with D/C plan     Approved per  list

## 2019-01-09 NOTE — PROGRESS NOTES
[facility]  Podiatry  Progress Note    Patient Name: Khanh Galvan  MRN: [id number]  Admission Date: 12/23/2018  Hospital Length of Stay: 17 days  Attending Physician: Jessica Garcia MD  Primary Care Provider: JONY Pena     Subjective:     Interval History:  Patient resting comfortably.  Plan to discharge today to NH.     Follow-up For: Procedure(s) (LRB):  INCISION AND DRAINAGE (Right)  AMPUTATION, TOE PARTIAL RIGHT GREAT TOE (Right)    Post-Operative Day: 14 Days Post-Op    Scheduled Meds:   enoxaparin  40 mg Subcutaneous Daily    insulin aspart U-100  13 Units Subcutaneous TIDWM    insulin detemir U-100  40 Units Subcutaneous Daily    lisinopril  40 mg Oral Daily     Continuous Infusions:  PRN Meds:acetaminophen, dextrose 50%, glucagon (human recombinant), hydrALAZINE, influenza, insulin aspart U-100, ondansetron, pneumoc 13-dutch conj-dip cr(PF), traMADol    Review of Systems   Constitutional: Positive for fatigue. Negative for activity change, appetite change, chills and fever.   Respiratory: Negative for cough and shortness of breath.    Cardiovascular: Negative for chest pain and leg swelling.   Gastrointestinal: Negative for diarrhea, nausea and vomiting.   Musculoskeletal: Positive for gait problem and myalgias.   Skin: Positive for wound.   Neurological: Positive for weakness and numbness.        + paresthesia      Objective:     Vital Signs (Most Recent):  Temp: 98.4 °F (36.9 °C) (01/09/19 1101)  Pulse: 74 (01/09/19 1101)  Resp: 19 (01/09/19 1101)  BP: 118/77 (01/09/19 1101)  SpO2: (!) 94 % (01/09/19 1101) Vital Signs (24h Range):  Temp:  [97.8 °F (36.6 °C)-98.4 °F (36.9 °C)] 98.4 °F (36.9 °C)  Pulse:  [71-84] 74  Resp:  [18-20] 19  SpO2:  [94 %-100 %] 94 %  BP: (114-139)/(59-83) 118/77     Weight: 120.8 kg (266 lb 6.4 oz)  Body mass index is 37.16 kg/m².    Foot Exam    General: Pt. is well-developed, well-nourished, appears stated age, in no acute distress, alert and  oriented x 3. No evidence of depression, anxiety, or agitation. Calm, cooperative, and communicative. Appropriate interactions and affect.    Surgical Site  Incision: area of eschar to incision line when sutures removed is actually an are of dehiscence.  Granular base, bleeding.  Signs of infection: none  Drainage: none    1/9/18 01/08 01/03            Laboratory:  CBC: No results for input(s): WBC, RBC, HGB, HCT, PLT, MCV, MCH, MCHC in the last 168 hours.  CMP: No results for input(s): GLU, CALCIUM, ALBUMIN, PROT, NA, K, CO2, CL, BUN, CREATININE, ALKPHOS, ALT, AST, BILITOT in the last 168 hours.  Microbiology Results (last 7 days)     ** No results found for the last 168 hours. **        Specimen (12h ago, onward)    None          Diagnostic Results:  Imaging Results          X-Ray Foot Complete Right (Final result)  Result time 12/23/18 19:53:33    Final result by Rain Delgado MD (12/23/18 19:53:33)                 Impression:      See above.      Electronically signed by: Rain Delgado MD  Date:    12/23/2018  Time:    19:53             Narrative:    EXAMINATION:  XR FOOT COMPLETE 3 VIEW RIGHT    CLINICAL HISTORY:  . Osteomyelitis, unspecified    TECHNIQUE:  AP, lateral, and oblique views of the right foot were performed.    COMPARISON:  None.    FINDINGS:  No evidence of acute fracture or dislocation.  Small ulceration with suspected small amount of soft tissue air seen at the distal aspect of the great toe. No definite osseous erosive or destructive changes to suggest osteomyelitis.  Future MRI follow-up would be more sensitive for detection if clinical concern exists.                               X-Ray Chest AP Portable (Final result)  Result time 12/23/18 19:50:32    Final result by Rain Delgado MD (12/23/18 19:50:32)                 Impression:      Cardiomegaly with suspected mild pulmonary edema.      Electronically signed by: Rain Delgado  MD  Date:    12/23/2018  Time:    19:50             Narrative:    EXAMINATION:  XR CHEST AP PORTABLE    CLINICAL HISTORY:  Sepsis;    TECHNIQUE:  Single frontal view of the chest was performed.    COMPARISON:  03/02/2018.    FINDINGS:  Heart is enlarged but stable in size.  There is prominence of central pulmonary vasculature with mild perihilar opacity and diffuse increased interstitial attenuation most suggestive for mild pulmonary edema.  No evidence of focal consolidation, pneumothorax, or large effusion.  No acute osseous abnormality identified.                                  Assessment/Plan:     Active Diagnoses:    Diagnosis Date Noted POA    PRINCIPAL PROBLEM:  Subacute osteomyelitis of right foot [M86.271] 02/26/2018 Yes    Morbid obesity due to excess calories [E66.01] 12/24/2018 Yes    Sepsis [A41.9] 12/24/2018 Yes    Diabetic ulcer of right great toe [E11.621, L97.519]  Yes    Acute osteomyelitis of toe, right [M86.171]  Yes    Cellulitis of right foot [L03.115] 02/24/2018 Yes    Type 2 diabetes mellitus with right diabetic foot infection [E11.628, L08.9] 02/24/2018 Yes      Problems Resolved During this Admission:    Diagnosis Date Noted Date Resolved POA    Elevated lactic acid level [R79.89] 12/23/2018 01/09/2019 Yes       POD #14 s/p  days right partial hallux amputation. Sutures removed.  No fluctuance noted and no purulent drainage noted.     Lateral 3/4th of incision dehisced.  Painted with betadine covered with aquacel Ag,  4x4 gauze wrapped with football dressing.     Proximal margin pathology clear      Partial heel WB right foot with CAM boot.      Ok for discharge from podiatry standpoint, placement pending secondary to domestic concerns with sick minor in the home.    Follow in wound clinic within 5 days of discharge, no home or facility dressing changes required    Jeanne La DPM  Podiatry  [facility]

## 2019-01-09 NOTE — NURSING
Called Dr. La's office at [phone] spoke Naz from the answering service who stated that she is unable to reach anyone from the office (nurse, MA, or )

## 2019-01-09 NOTE — DISCHARGE SUMMARY
[facility] Hospital Medicine  Discharge Summary      Patient Name: Khanh Galvan  MRN: [id number]  Admission Date: 12/23/2018  Hospital Length of Stay: 17 days  Discharge Date and Time:  01/09/2019 2:11 PM  Attending Physician: Jessica Garcia MD   Discharging Provider: Jessica Gracia MD  Primary Care Provider: JONY Pena      HPI:     Khanh Galvan is a 58 y.o. male that (in part)  has a past medical history of Diabetes mellitus, Hypertension, and Subacute osteomyelitis.  has a past surgical history that includes left foot. Presents to [facility] by EMS who was called for fatigue.  When EMS arrived the patient was lethargic and nearly attended.  He was found to be hyperglycemic and hypotensive.  IV fluids were initiated and mental status improved along with his blood pressure.  He was found to have a right-sided diabetic ft lesion.  He reports sustaining injury from stubbing his right great toe on the stairs.  He developed a painful, red, swollen, and warm lesion to his right great toe that has worsened over the last 3 days.  Unfortunately the patient is a poor historian and history is limited.      In the emergency department routine laboratory studies were obtained and x-ray the right foot was performed.  He had elevated ESR, CRP, and physical exam was consistent with diabetic foot lesion with osteomyelitis.  He was given IV fluids and started on empiric antibiotic therapy due to associated cellulitis and concern for possible bacteremia with systemic infection causing infectious encephalopathy.    Hospital medicine has been asked to admit for further evaluation and treatment.     Procedure(s) (LRB):  INCISION AND DRAINAGE (Right)  AMPUTATION, TOE PARTIAL RIGHT GREAT TOE (Right)      Hospital Course:   Mr Galvan presented with subacute osteomyelitis of right big toe. Podiatry consulted. Underwent partial amputation of right toe.  "Biopsy of amputated bone showed moderate Klebsiella (ESBL), many Strep Group B and few Hafnia alvei. ID consulted. Abx tailored to ertapenem. Per son, patient lives with him, his wife and a baby who is a heart patient and were concerned about baby being exposed to bacteria. SNF was planned. Biopsy of proximal bone resulted as "Viable bone exhibiting no inflammatory infiltrates". Discussed with ID who recommended no further abx treatment, just wound care. Patient can go home with HH. Has a walker at home. Family requested another day as they live very far and need to plan ahead to pick him up.  Family then decided that they cannot take care of him at home. Patient is in agreement with care home NH. Patient discharged to [facility] with care home nursing care. Note glucoses have at times been elevated-- he may need further adjustment of his insulin regimen after discharge.      Consults:   Consults (From admission, onward)        Status Ordering Provider     Inpatient consult to Infectious Diseases  Once     Provider:  Mike Amezquita MD    Completed VICK WALLIS     [facility]  Once     Provider:  (Not yet assigned)    Acknowledged MIKE AMEZQUITA     Inpatient consult to Podiatry  Once     Provider:  Wilner Marie    Completed JASON BURTON     IP consult to case management  Once     Provider:  (Not yet assigned)    Completed JOSS DU          No new Assessment & Plan notes have been filed under this hospital service since the last note was generated.  Service: Hospital Medicine    Final Active Diagnoses:    Diagnosis Date Noted POA    PRINCIPAL PROBLEM:  Subacute osteomyelitis of right foot [M86.271] 02/26/2018 Yes    Morbid obesity due to excess calories [E66.01] 12/24/2018 Yes    Sepsis [A41.9] 12/24/2018 Yes    Diabetic ulcer of right great toe [E11.621, L97.519]  Yes    Acute osteomyelitis of toe, right [M86.171]  Yes    Cellulitis of right " foot [L03.115] 02/24/2018 Yes    Type 2 diabetes mellitus with right diabetic foot infection [E11.628, L08.9] 02/24/2018 Yes      Problems Resolved During this Admission:    Diagnosis Date Noted Date Resolved POA    Elevated lactic acid level [R79.89] 12/23/2018 01/09/2019 Yes       Discharged Condition: good    Disposition: Long Term Care    Follow Up:  Follow-up Information     [facility] On 1/16/2019.    Why:  On Wednesday on 9:40am, outpatient services               Patient Instructions:      Ambulatory Referral to Podiatry   Referral Priority: Routine Referral Type: Consultation   Referral Reason: Specialty Services Required   Requested Specialty: Podiatry   Number of Visits Requested: 1     Diet diabetic     Notify your health care provider if you experience any of the following:  temperature >100.4     Notify your health care provider if you experience any of the following:  persistent nausea and vomiting or diarrhea     Notify your health care provider if you experience any of the following:  severe uncontrolled pain     Notify your health care provider if you experience any of the following:  redness, tenderness, or signs of infection (pain, swelling, redness, odor or green/yellow discharge around incision site)     Notify your health care provider if you experience any of the following:  difficulty breathing or increased cough     Notify your health care provider if you experience any of the following:  severe persistent headache     Notify your health care provider if you experience any of the following:  worsening rash     Notify your health care provider if you experience any of the following:  persistent dizziness, light-headedness, or visual disturbances     Notify your health care provider if you experience any of the following:  increased confusion or weakness     Activity as tolerated       Significant Diagnostic Studies: Labs: All labs within the past 24 hours have been  reviewed    Pending Diagnostic Studies:     None         Medications:  Reconciled Home Medications:      Medication List      CHANGE how you take these medications    insulin aspart U-100 100 unit/mL Inpn pen  Commonly known as:  NovoLOG  Inject 10 Units into the skin 3 (three) times daily.  What changed:    · how much to take  · when to take this     insulin glargine 100 units/mL (3mL) SubQ pen  Commonly known as:  BASAGLAR KWIKPEN U-100 INSULIN  Inject 40 Units into the skin once daily.  What changed:    · medication strength  · when to take this        CONTINUE taking these medications    gabapentin 300 MG capsule  Commonly known as:  NEURONTIN  Take 300 mg by mouth 3 (three) times daily.     lisinopril 20 MG tablet  Commonly known as:  PRINIVIL,ZESTRIL  Take 20 mg by mouth once daily.     polyethylene glycol 17 gram Pwpk  Commonly known as:  GLYCOLAX  Take 17 g by mouth once daily.     tamsulosin 0.4 mg Cap  Commonly known as:  FLOMAX  Take 0.4 mg by mouth once daily.        STOP taking these medications    dextrose 50% injection     glucose 4 GM chewable tablet     metFORMIN 500 MG tablet  Commonly known as:  GLUCOPHAGE            Indwelling Lines/Drains at time of discharge:   Lines/Drains/Airways          None          Time spent on the discharge of patient: 45 minutes  Patient was seen and examined on the date of discharge and determined to be suitable for discharge.         Jessica Garcia MD  Department of Hospital Medicine  [facility]

## 2019-01-09 NOTE — PLAN OF CARE
Call report information provided to Nurse Johana.     Patient can discharge from  standpoint.       01/09/19 1706   Final Note   Assessment Type Final Discharge Note   Anticipated Discharge Disposition care home [facility]   What phone number can be called within the next 1-3 days to see how you are doing after discharge? ([phone])   Hospital Follow Up  Appt(s) scheduled? Yes   Discharge plans and expectations educations in teach back method with documentation complete? Yes   Right Care Referral Info   Post Acute Recommendation Other   Facility Name ([facility])

## 2019-01-09 NOTE — PROGRESS NOTES
Call report information received from Bernardo at [facility]. Patient going to room 505B.     Patient informed that he will discharge to [facility] today. Patient also informed that [place] will allow him to sign documents there. Patient voiced understanding.     [phone] [place] contacted [facility] transportation @ [phone]. Spoke with Vilma. Patient will travel via wheelchair. Mels will arrive within the hour.

## 2019-01-09 NOTE — PROGRESS NOTES
Called received from Bernardo at [facility] inquiring if any family members have been reached. [place] explained to Bernardo that no one is answering their phones. TN inquired if they would allow patient to sign paperwork himself. Bernardo will allow patient to admit himself. Awaiting call report information.       All updated clinicals sent via [facility] to [place].

## 2019-01-10 NOTE — PT/OT/SLP DISCHARGE
Occupational Therapy Discharge Summary    Khanh Galvan  MRN: [id number]   Principal Problem: Subacute osteomyelitis of right foot      Patient Discharged from acute Occupational Therapy on 1/9/18.  Please refer to prior OT notes for functional status.    Assessment:      Patient was discharged unexpectedly.  Information required to complete an accurate discharge summary is unknown.  Refer to therapy initial evaluation and last progress note for initial and most recent functional status and goal achievement.  Recommendations made may be found in medical record.    Objective:     GOALS:   Multidisciplinary Problems     Occupational Therapy Goals        Problem: Occupational Therapy Goal    Goal Priority Disciplines Outcome Interventions   Occupational Therapy Goal     OT, PT/OT Ongoing (interventions implemented as appropriate)    Description:  Goals to be met by: 1/10/18    Patient will increase functional independence with ADLs by performing:    UE Dressing with Stand-by Assistance.  LE Dressing with Stand-by Assistance.  Grooming while standing at sink with Stand-by Assistance.  Supine to sit with Stand-by Assistance.  Step transfer with Stand-by Assistance  Toilet transfer to toilet with Stand-by Assistance.  Upper extremity exercise program x15 reps per handout, with assistance as needed. Met 12/28/18                     Reasons for Discontinuation of Therapy Services  Transfer to alternate level of care.      Plan:     Patient Discharged to: [facility] placement    Claudia Augustine OT  1/10/2019

## 2019-01-10 NOTE — PROGRESS NOTES
1/10/19--call from Colby at [facility] who received referral for patient but also information that patient went to [facility]. SW informed her that patient discharged this hospital to [facility] 1/9/19.

## 2019-01-16 ENCOUNTER — HOSPITAL ENCOUNTER (OUTPATIENT)
Dept: WOUND CARE | Facility: HOSPITAL | Age: 59
Discharge: HOME | End: 2019-01-16
Attending: FAMILY MEDICINE
Payer: MEDICAID

## 2019-01-16 DIAGNOSIS — M86.9 OSTEOMYELITIS, UNSPECIFIED SITE, UNSPECIFIED TYPE: ICD-10-CM

## 2019-01-16 DIAGNOSIS — L97.512 DIABETIC ULCER OF TOE OF RIGHT FOOT ASSOCIATED WITH TYPE 2 DIABETES MELLITUS, WITH FAT LAYER EXPOSED: Primary | ICD-10-CM

## 2019-01-16 DIAGNOSIS — E11.621 DIABETIC ULCER OF TOE OF RIGHT FOOT ASSOCIATED WITH TYPE 2 DIABETES MELLITUS, WITH FAT LAYER EXPOSED: Primary | ICD-10-CM

## 2019-01-16 DIAGNOSIS — F32.A DEPRESSION, UNSPECIFIED DEPRESSION TYPE: ICD-10-CM

## 2019-01-16 PROCEDURE — 99213 OFFICE O/P EST LOW 20 MIN: CPT | Performed by: FAMILY MEDICINE

## 2019-01-16 PROCEDURE — 25000003 PHARM REV CODE 250

## 2019-01-16 PROCEDURE — 11042 DBRDMT SUBQ TIS 1ST 20SQCM/<: CPT | Performed by: FAMILY MEDICINE

## 2019-01-16 PROCEDURE — 11042 DBRDMT SUBQ TIS 1ST 20SQCM/<: CPT | Mod: ,,, | Performed by: FAMILY MEDICINE

## 2019-01-16 PROCEDURE — 11042 PR DEBRIDEMENT, SKIN, SUB-Q TISSUE,=<20 SQ CM: ICD-10-PCS | Mod: ,,, | Performed by: FAMILY MEDICINE

## 2019-01-16 PROCEDURE — 99214 OFFICE O/P EST MOD 30 MIN: CPT | Mod: 25,,, | Performed by: FAMILY MEDICINE

## 2019-01-16 PROCEDURE — 99214 PR OFFICE/OUTPT VISIT, EST, LEVL IV, 30-39 MIN: ICD-10-PCS | Mod: 25,,, | Performed by: FAMILY MEDICINE

## 2019-01-23 ENCOUNTER — HOSPITAL ENCOUNTER (OUTPATIENT)
Dept: WOUND CARE | Facility: HOSPITAL | Age: 59
Discharge: HOME | End: 2019-01-23
Attending: FAMILY MEDICINE
Payer: MEDICAID

## 2019-01-23 DIAGNOSIS — M86.9 OSTEOMYELITIS, UNSPECIFIED SITE, UNSPECIFIED TYPE: ICD-10-CM

## 2019-01-23 DIAGNOSIS — F32.A DEPRESSION, UNSPECIFIED DEPRESSION TYPE: ICD-10-CM

## 2019-01-23 DIAGNOSIS — L97.512 DIABETIC ULCER OF TOE OF RIGHT FOOT ASSOCIATED WITH TYPE 2 DIABETES MELLITUS, WITH FAT LAYER EXPOSED: Primary | ICD-10-CM

## 2019-01-23 DIAGNOSIS — E11.621 DIABETIC ULCER OF TOE OF RIGHT FOOT ASSOCIATED WITH TYPE 2 DIABETES MELLITUS, WITH FAT LAYER EXPOSED: Primary | ICD-10-CM

## 2019-01-23 PROCEDURE — 25000003 PHARM REV CODE 250

## 2019-01-23 PROCEDURE — 99214 PR OFFICE/OUTPT VISIT, EST, LEVL IV, 30-39 MIN: ICD-10-PCS | Mod: 25,,, | Performed by: FAMILY MEDICINE

## 2019-01-23 PROCEDURE — 11042 DBRDMT SUBQ TIS 1ST 20SQCM/<: CPT | Mod: ,,, | Performed by: FAMILY MEDICINE

## 2019-01-23 PROCEDURE — 99214 OFFICE O/P EST MOD 30 MIN: CPT | Mod: 25,,, | Performed by: FAMILY MEDICINE

## 2019-01-23 PROCEDURE — 11042 PR DEBRIDEMENT, SKIN, SUB-Q TISSUE,=<20 SQ CM: ICD-10-PCS | Mod: ,,, | Performed by: FAMILY MEDICINE

## 2019-01-23 PROCEDURE — 11042 DBRDMT SUBQ TIS 1ST 20SQCM/<: CPT | Performed by: FAMILY MEDICINE

## 2019-01-30 ENCOUNTER — HOSPITAL ENCOUNTER (OUTPATIENT)
Dept: WOUND CARE | Facility: HOSPITAL | Age: 59
Discharge: HOME | End: 2019-01-30
Attending: FAMILY MEDICINE
Payer: MEDICAID

## 2019-01-30 DIAGNOSIS — L97.512 DIABETIC ULCER OF TOE OF RIGHT FOOT ASSOCIATED WITH TYPE 2 DIABETES MELLITUS, WITH FAT LAYER EXPOSED: Primary | ICD-10-CM

## 2019-01-30 DIAGNOSIS — F32.A DEPRESSION, UNSPECIFIED DEPRESSION TYPE: ICD-10-CM

## 2019-01-30 DIAGNOSIS — M86.9 OSTEOMYELITIS, UNSPECIFIED SITE, UNSPECIFIED TYPE: ICD-10-CM

## 2019-01-30 DIAGNOSIS — E11.621 DIABETIC ULCER OF TOE OF RIGHT FOOT ASSOCIATED WITH TYPE 2 DIABETES MELLITUS, WITH FAT LAYER EXPOSED: Primary | ICD-10-CM

## 2019-01-30 PROCEDURE — 99214 OFFICE O/P EST MOD 30 MIN: CPT | Mod: 25,,, | Performed by: FAMILY MEDICINE

## 2019-01-30 PROCEDURE — 11042 PR DEBRIDEMENT, SKIN, SUB-Q TISSUE,=<20 SQ CM: ICD-10-PCS | Mod: ,,, | Performed by: FAMILY MEDICINE

## 2019-01-30 PROCEDURE — 25000003 PHARM REV CODE 250

## 2019-01-30 PROCEDURE — 11042 DBRDMT SUBQ TIS 1ST 20SQCM/<: CPT | Mod: ,,, | Performed by: FAMILY MEDICINE

## 2019-01-30 PROCEDURE — 99214 PR OFFICE/OUTPT VISIT, EST, LEVL IV, 30-39 MIN: ICD-10-PCS | Mod: 25,,, | Performed by: FAMILY MEDICINE

## 2019-01-30 PROCEDURE — 11042 DBRDMT SUBQ TIS 1ST 20SQCM/<: CPT | Performed by: FAMILY MEDICINE

## 2019-02-06 ENCOUNTER — HOSPITAL ENCOUNTER (OUTPATIENT)
Dept: WOUND CARE | Facility: HOSPITAL | Age: 59
Discharge: HOME | End: 2019-02-06
Attending: FAMILY MEDICINE
Payer: MEDICAID

## 2019-02-06 DIAGNOSIS — F32.A DEPRESSION, UNSPECIFIED DEPRESSION TYPE: ICD-10-CM

## 2019-02-06 DIAGNOSIS — L97.512 DIABETIC ULCER OF TOE OF RIGHT FOOT ASSOCIATED WITH TYPE 2 DIABETES MELLITUS, WITH FAT LAYER EXPOSED: Primary | ICD-10-CM

## 2019-02-06 DIAGNOSIS — E11.621 DIABETIC ULCER OF TOE OF RIGHT FOOT ASSOCIATED WITH TYPE 2 DIABETES MELLITUS, WITH FAT LAYER EXPOSED: Primary | ICD-10-CM

## 2019-02-06 DIAGNOSIS — M86.9 OSTEOMYELITIS, UNSPECIFIED SITE, UNSPECIFIED TYPE: ICD-10-CM

## 2019-02-06 PROCEDURE — 11042 PR DEBRIDEMENT, SKIN, SUB-Q TISSUE,=<20 SQ CM: ICD-10-PCS | Mod: ,,, | Performed by: FAMILY MEDICINE

## 2019-02-06 PROCEDURE — 11042 DBRDMT SUBQ TIS 1ST 20SQCM/<: CPT | Mod: ,,, | Performed by: FAMILY MEDICINE

## 2019-02-06 PROCEDURE — 99214 OFFICE O/P EST MOD 30 MIN: CPT | Mod: 25,,, | Performed by: FAMILY MEDICINE

## 2019-02-06 PROCEDURE — 99214 PR OFFICE/OUTPT VISIT, EST, LEVL IV, 30-39 MIN: ICD-10-PCS | Mod: 25,,, | Performed by: FAMILY MEDICINE

## 2019-02-06 PROCEDURE — 11042 DBRDMT SUBQ TIS 1ST 20SQCM/<: CPT | Performed by: FAMILY MEDICINE

## 2019-02-13 ENCOUNTER — HOSPITAL ENCOUNTER (OUTPATIENT)
Dept: WOUND CARE | Facility: HOSPITAL | Age: 59
Discharge: HOME | End: 2019-02-13
Attending: FAMILY MEDICINE
Payer: MEDICAID

## 2019-02-13 DIAGNOSIS — M86.9 OSTEOMYELITIS, UNSPECIFIED SITE, UNSPECIFIED TYPE: ICD-10-CM

## 2019-02-13 DIAGNOSIS — L97.512 DIABETIC ULCER OF TOE OF RIGHT FOOT ASSOCIATED WITH TYPE 2 DIABETES MELLITUS, WITH FAT LAYER EXPOSED: Primary | ICD-10-CM

## 2019-02-13 DIAGNOSIS — E11.621 DIABETIC ULCER OF TOE OF RIGHT FOOT ASSOCIATED WITH TYPE 2 DIABETES MELLITUS, WITH FAT LAYER EXPOSED: Primary | ICD-10-CM

## 2019-02-13 PROCEDURE — 17250 CHEM CAUT OF GRANLTJ TISSUE: CPT | Performed by: FAMILY MEDICINE

## 2019-02-13 PROCEDURE — 99214 PR OFFICE/OUTPT VISIT, EST, LEVL IV, 30-39 MIN: ICD-10-PCS | Mod: 25,,, | Performed by: FAMILY MEDICINE

## 2019-02-13 PROCEDURE — 99214 OFFICE O/P EST MOD 30 MIN: CPT | Mod: 25,,, | Performed by: FAMILY MEDICINE

## 2019-02-13 PROCEDURE — 17250 CHEM CAUT OF GRANLTJ TISSUE: CPT | Mod: ,,, | Performed by: FAMILY MEDICINE

## 2019-02-13 PROCEDURE — 17250 PR CHEM CAUTERY GRANULATN TISSUE: ICD-10-PCS | Mod: ,,, | Performed by: FAMILY MEDICINE

## 2019-02-20 ENCOUNTER — HOSPITAL ENCOUNTER (OUTPATIENT)
Dept: WOUND CARE | Facility: HOSPITAL | Age: 59
Discharge: HOME | End: 2019-02-20
Attending: FAMILY MEDICINE
Payer: MEDICAID

## 2019-02-20 DIAGNOSIS — L97.512 DIABETIC ULCER OF TOE OF RIGHT FOOT ASSOCIATED WITH TYPE 2 DIABETES MELLITUS, WITH FAT LAYER EXPOSED: Primary | ICD-10-CM

## 2019-02-20 DIAGNOSIS — E11.621 DIABETIC ULCER OF TOE OF RIGHT FOOT ASSOCIATED WITH TYPE 2 DIABETES MELLITUS, WITH FAT LAYER EXPOSED: Primary | ICD-10-CM

## 2019-02-20 DIAGNOSIS — F32.A DEPRESSION, UNSPECIFIED DEPRESSION TYPE: ICD-10-CM

## 2019-02-20 DIAGNOSIS — M86.9 OSTEOMYELITIS, UNSPECIFIED SITE, UNSPECIFIED TYPE: ICD-10-CM

## 2019-02-20 PROCEDURE — 99214 OFFICE O/P EST MOD 30 MIN: CPT | Mod: 25,,, | Performed by: FAMILY MEDICINE

## 2019-02-20 PROCEDURE — 99214 PR OFFICE/OUTPT VISIT, EST, LEVL IV, 30-39 MIN: ICD-10-PCS | Mod: 25,,, | Performed by: FAMILY MEDICINE

## 2019-02-20 PROCEDURE — 11042 DBRDMT SUBQ TIS 1ST 20SQCM/<: CPT | Mod: ,,, | Performed by: FAMILY MEDICINE

## 2019-02-20 PROCEDURE — 11042 DBRDMT SUBQ TIS 1ST 20SQCM/<: CPT | Performed by: FAMILY MEDICINE

## 2019-02-20 PROCEDURE — 11042 PR DEBRIDEMENT, SKIN, SUB-Q TISSUE,=<20 SQ CM: ICD-10-PCS | Mod: ,,, | Performed by: FAMILY MEDICINE

## 2019-02-26 LAB
ACID FAST MOD KINY STN SPEC: NORMAL
MYCOBACTERIUM SPEC QL CULT: NORMAL

## 2019-02-27 ENCOUNTER — HOSPITAL ENCOUNTER (OUTPATIENT)
Dept: WOUND CARE | Facility: HOSPITAL | Age: 59
Discharge: HOME | End: 2019-02-27
Attending: FAMILY MEDICINE
Payer: MEDICAID

## 2019-02-27 DIAGNOSIS — E11.621 DIABETIC ULCER OF TOE OF RIGHT FOOT ASSOCIATED WITH TYPE 2 DIABETES MELLITUS, WITH FAT LAYER EXPOSED: Primary | ICD-10-CM

## 2019-02-27 DIAGNOSIS — M86.9 OSTEOMYELITIS, UNSPECIFIED SITE, UNSPECIFIED TYPE: ICD-10-CM

## 2019-02-27 DIAGNOSIS — L97.512 DIABETIC ULCER OF TOE OF RIGHT FOOT ASSOCIATED WITH TYPE 2 DIABETES MELLITUS, WITH FAT LAYER EXPOSED: Primary | ICD-10-CM

## 2019-02-27 PROCEDURE — 17250 CHEM CAUT OF GRANLTJ TISSUE: CPT | Mod: ,,, | Performed by: FAMILY MEDICINE

## 2019-02-27 PROCEDURE — 99214 PR OFFICE/OUTPT VISIT, EST, LEVL IV, 30-39 MIN: ICD-10-PCS | Mod: 25,,, | Performed by: FAMILY MEDICINE

## 2019-02-27 PROCEDURE — 17250 CHEM CAUT OF GRANLTJ TISSUE: CPT | Performed by: FAMILY MEDICINE

## 2019-02-27 PROCEDURE — 99214 OFFICE O/P EST MOD 30 MIN: CPT | Mod: 25,,, | Performed by: FAMILY MEDICINE

## 2019-02-27 PROCEDURE — 17250 PR CHEM CAUTERY GRANULATN TISSUE: ICD-10-PCS | Mod: ,,, | Performed by: FAMILY MEDICINE

## 2019-03-06 ENCOUNTER — HOSPITAL ENCOUNTER (OUTPATIENT)
Dept: WOUND CARE | Facility: HOSPITAL | Age: 59
Discharge: HOME | End: 2019-03-06
Attending: FAMILY MEDICINE
Payer: MEDICAID

## 2019-03-06 ENCOUNTER — HOSPITAL ENCOUNTER (OUTPATIENT)
Dept: CARDIOLOGY | Facility: HOSPITAL | Age: 59
Discharge: HOME | End: 2019-03-06
Attending: FAMILY MEDICINE
Payer: MEDICAID

## 2019-03-06 DIAGNOSIS — L97.512 DIABETIC ULCER OF TOE OF RIGHT FOOT ASSOCIATED WITH TYPE 2 DIABETES MELLITUS, WITH FAT LAYER EXPOSED: ICD-10-CM

## 2019-03-06 DIAGNOSIS — E11.621 DIABETIC ULCER OF TOE OF RIGHT FOOT ASSOCIATED WITH TYPE 2 DIABETES MELLITUS, WITH FAT LAYER EXPOSED: ICD-10-CM

## 2019-03-06 DIAGNOSIS — M86.9 OSTEOMYELITIS, UNSPECIFIED SITE, UNSPECIFIED TYPE: ICD-10-CM

## 2019-03-06 DIAGNOSIS — L97.512 DIABETIC ULCER OF TOE OF RIGHT FOOT ASSOCIATED WITH TYPE 2 DIABETES MELLITUS, WITH FAT LAYER EXPOSED: Primary | ICD-10-CM

## 2019-03-06 DIAGNOSIS — E11.621 DIABETIC ULCER OF TOE OF RIGHT FOOT ASSOCIATED WITH TYPE 2 DIABETES MELLITUS, WITH FAT LAYER EXPOSED: Primary | ICD-10-CM

## 2019-03-06 LAB
LEFT ABI: 1.41
LEFT ARM BP: 125 MMHG
LEFT DORSALIS PEDIS: 147 MMHG
LEFT POSTERIOR TIBIAL: 179 MMHG
LEFT TBI: 1.09
LEFT TOE PRESSURE: 138 MMHG
RIGHT ABI: 1.32
RIGHT ARM BP: 127 MMHG
RIGHT DORSALIS PEDIS: 168 MMHG
RIGHT POSTERIOR TIBIAL: 147 MMHG
RIGHT TBI: 0.97
RIGHT TOE PRESSURE: 123 MMHG

## 2019-03-06 PROCEDURE — 99214 PR OFFICE/OUTPT VISIT, EST, LEVL IV, 30-39 MIN: ICD-10-PCS | Mod: ,,, | Performed by: FAMILY MEDICINE

## 2019-03-06 PROCEDURE — 99213 OFFICE O/P EST LOW 20 MIN: CPT | Performed by: FAMILY MEDICINE

## 2019-03-06 PROCEDURE — 93922 UPR/L XTREMITY ART 2 LEVELS: CPT | Mod: 50

## 2019-03-06 PROCEDURE — 99214 OFFICE O/P EST MOD 30 MIN: CPT | Mod: ,,, | Performed by: FAMILY MEDICINE

## 2019-03-06 PROCEDURE — 93922 UPR/L XTREMITY ART 2 LEVELS: CPT | Mod: 26,,, | Performed by: INTERNAL MEDICINE

## 2019-03-06 PROCEDURE — 25000003 PHARM REV CODE 250

## 2019-03-06 PROCEDURE — 93922 CV US ANKLE BRACHIAL INDICES WO STRESS W TOE TRACINGS (CUPID ONLY): ICD-10-PCS | Mod: 26,,, | Performed by: INTERNAL MEDICINE

## 2019-03-13 ENCOUNTER — HOSPITAL ENCOUNTER (OUTPATIENT)
Dept: WOUND CARE | Facility: HOSPITAL | Age: 59
Discharge: HOME | End: 2019-03-13
Attending: FAMILY MEDICINE
Payer: MEDICAID

## 2019-03-13 DIAGNOSIS — M86.9 OSTEOMYELITIS, UNSPECIFIED SITE, UNSPECIFIED TYPE: ICD-10-CM

## 2019-03-13 DIAGNOSIS — E11.621 DIABETIC ULCER OF TOE OF RIGHT FOOT ASSOCIATED WITH TYPE 2 DIABETES MELLITUS, WITH FAT LAYER EXPOSED: Primary | ICD-10-CM

## 2019-03-13 DIAGNOSIS — L97.512 DIABETIC ULCER OF TOE OF RIGHT FOOT ASSOCIATED WITH TYPE 2 DIABETES MELLITUS, WITH FAT LAYER EXPOSED: Primary | ICD-10-CM

## 2019-03-13 PROCEDURE — 99213 OFFICE O/P EST LOW 20 MIN: CPT | Performed by: FAMILY MEDICINE

## 2019-03-13 PROCEDURE — 99214 OFFICE O/P EST MOD 30 MIN: CPT | Mod: ,,, | Performed by: FAMILY MEDICINE

## 2019-03-13 PROCEDURE — 99214 PR OFFICE/OUTPT VISIT, EST, LEVL IV, 30-39 MIN: ICD-10-PCS | Mod: ,,, | Performed by: FAMILY MEDICINE

## 2019-03-13 PROCEDURE — 25000003 PHARM REV CODE 250

## 2019-03-20 ENCOUNTER — HOSPITAL ENCOUNTER (OUTPATIENT)
Dept: WOUND CARE | Facility: HOSPITAL | Age: 59
Discharge: HOME | End: 2019-03-20
Attending: FAMILY MEDICINE
Payer: MEDICAID

## 2019-03-20 DIAGNOSIS — M86.9 OSTEOMYELITIS, UNSPECIFIED SITE, UNSPECIFIED TYPE: ICD-10-CM

## 2019-03-20 DIAGNOSIS — E11.621 DIABETIC ULCER OF TOE OF RIGHT FOOT ASSOCIATED WITH TYPE 2 DIABETES MELLITUS, WITH FAT LAYER EXPOSED: Primary | ICD-10-CM

## 2019-03-20 DIAGNOSIS — L97.512 DIABETIC ULCER OF TOE OF RIGHT FOOT ASSOCIATED WITH TYPE 2 DIABETES MELLITUS, WITH FAT LAYER EXPOSED: Primary | ICD-10-CM

## 2019-03-20 DIAGNOSIS — L30.9 ECZEMA, UNSPECIFIED TYPE: ICD-10-CM

## 2019-03-20 PROCEDURE — 11000 DBRDMT ECZ/INFECTED SKIN<10%: CPT | Performed by: FAMILY MEDICINE

## 2019-03-20 PROCEDURE — 11000 PR DEBRIDEMENT, INFECTED SKIN, UP TO 10% BSA: ICD-10-PCS | Mod: ,,, | Performed by: FAMILY MEDICINE

## 2019-03-20 PROCEDURE — 11000 DBRDMT ECZ/INFECTED SKIN<10%: CPT | Mod: ,,, | Performed by: FAMILY MEDICINE

## 2019-03-20 PROCEDURE — 99214 PR OFFICE/OUTPT VISIT, EST, LEVL IV, 30-39 MIN: ICD-10-PCS | Mod: 25,,, | Performed by: FAMILY MEDICINE

## 2019-03-20 PROCEDURE — 99213 OFFICE O/P EST LOW 20 MIN: CPT | Performed by: FAMILY MEDICINE

## 2019-03-20 PROCEDURE — 99214 OFFICE O/P EST MOD 30 MIN: CPT | Mod: 25,,, | Performed by: FAMILY MEDICINE

## 2020-09-07 ENCOUNTER — LAB VISIT (OUTPATIENT)
Dept: LAB | Facility: OTHER | Age: 60
End: 2020-09-07
Payer: MEDICAID

## 2020-09-07 DIAGNOSIS — Z03.818 ENCOUNTER FOR OBSERVATION FOR SUSPECTED EXPOSURE TO OTHER BIOLOGICAL AGENTS RULED OUT: ICD-10-CM

## 2020-09-07 PROCEDURE — U0003 INFECTIOUS AGENT DETECTION BY NUCLEIC ACID (DNA OR RNA); SEVERE ACUTE RESPIRATORY SYNDROME CORONAVIRUS 2 (SARS-COV-2) (CORONAVIRUS DISEASE [COVID-19]), AMPLIFIED PROBE TECHNIQUE, MAKING USE OF HIGH THROUGHPUT TECHNOLOGIES AS DESCRIBED BY CMS-2020-01-R: HCPCS

## 2020-09-09 LAB — SARS-COV-2 RNA RESP QL NAA+PROBE: NOT DETECTED

## 2020-09-14 ENCOUNTER — LAB VISIT (OUTPATIENT)
Dept: LAB | Facility: OTHER | Age: 60
End: 2020-09-14
Payer: MEDICAID

## 2020-09-14 DIAGNOSIS — Z03.818 ENCOUNTER FOR OBSERVATION FOR SUSPECTED EXPOSURE TO OTHER BIOLOGICAL AGENTS RULED OUT: ICD-10-CM

## 2020-09-14 PROCEDURE — U0003 INFECTIOUS AGENT DETECTION BY NUCLEIC ACID (DNA OR RNA); SEVERE ACUTE RESPIRATORY SYNDROME CORONAVIRUS 2 (SARS-COV-2) (CORONAVIRUS DISEASE [COVID-19]), AMPLIFIED PROBE TECHNIQUE, MAKING USE OF HIGH THROUGHPUT TECHNOLOGIES AS DESCRIBED BY CMS-2020-01-R: HCPCS

## 2020-09-15 LAB — SARS-COV-2 RNA RESP QL NAA+PROBE: NOT DETECTED

## 2020-09-21 ENCOUNTER — LAB VISIT (OUTPATIENT)
Dept: LAB | Facility: OTHER | Age: 60
End: 2020-09-21
Attending: INTERNAL MEDICINE
Payer: MEDICAID

## 2020-09-21 DIAGNOSIS — Z03.818 ENCOUNTER FOR OBSERVATION FOR SUSPECTED EXPOSURE TO OTHER BIOLOGICAL AGENTS RULED OUT: ICD-10-CM

## 2020-09-21 PROCEDURE — U0003 INFECTIOUS AGENT DETECTION BY NUCLEIC ACID (DNA OR RNA); SEVERE ACUTE RESPIRATORY SYNDROME CORONAVIRUS 2 (SARS-COV-2) (CORONAVIRUS DISEASE [COVID-19]), AMPLIFIED PROBE TECHNIQUE, MAKING USE OF HIGH THROUGHPUT TECHNOLOGIES AS DESCRIBED BY CMS-2020-01-R: HCPCS

## 2020-09-22 LAB — SARS-COV-2 RNA RESP QL NAA+PROBE: NOT DETECTED

## 2020-09-28 ENCOUNTER — LAB VISIT (OUTPATIENT)
Dept: LAB | Facility: OTHER | Age: 60
End: 2020-09-28
Payer: MEDICAID

## 2020-09-28 DIAGNOSIS — Z03.818 ENCOUNTER FOR OBSERVATION FOR SUSPECTED EXPOSURE TO OTHER BIOLOGICAL AGENTS RULED OUT: ICD-10-CM

## 2020-09-28 PROCEDURE — U0003 INFECTIOUS AGENT DETECTION BY NUCLEIC ACID (DNA OR RNA); SEVERE ACUTE RESPIRATORY SYNDROME CORONAVIRUS 2 (SARS-COV-2) (CORONAVIRUS DISEASE [COVID-19]), AMPLIFIED PROBE TECHNIQUE, MAKING USE OF HIGH THROUGHPUT TECHNOLOGIES AS DESCRIBED BY CMS-2020-01-R: HCPCS

## 2020-09-29 LAB — SARS-COV-2 RNA RESP QL NAA+PROBE: NOT DETECTED

## 2020-10-05 ENCOUNTER — LAB VISIT (OUTPATIENT)
Dept: LAB | Facility: OTHER | Age: 60
End: 2020-10-05
Payer: MEDICAID

## 2020-10-05 DIAGNOSIS — Z03.818 ENCOUNTER FOR OBSERVATION FOR SUSPECTED EXPOSURE TO OTHER BIOLOGICAL AGENTS RULED OUT: ICD-10-CM

## 2020-10-05 PROCEDURE — U0003 INFECTIOUS AGENT DETECTION BY NUCLEIC ACID (DNA OR RNA); SEVERE ACUTE RESPIRATORY SYNDROME CORONAVIRUS 2 (SARS-COV-2) (CORONAVIRUS DISEASE [COVID-19]), AMPLIFIED PROBE TECHNIQUE, MAKING USE OF HIGH THROUGHPUT TECHNOLOGIES AS DESCRIBED BY CMS-2020-01-R: HCPCS

## 2020-10-06 LAB — SARS-COV-2 RNA RESP QL NAA+PROBE: NOT DETECTED

## 2020-10-12 ENCOUNTER — LAB VISIT (OUTPATIENT)
Dept: LAB | Facility: OTHER | Age: 60
End: 2020-10-12
Payer: MEDICAID

## 2020-10-12 DIAGNOSIS — Z03.818 ENCOUNTER FOR OBSERVATION FOR SUSPECTED EXPOSURE TO OTHER BIOLOGICAL AGENTS RULED OUT: ICD-10-CM

## 2020-10-12 PROCEDURE — U0003 INFECTIOUS AGENT DETECTION BY NUCLEIC ACID (DNA OR RNA); SEVERE ACUTE RESPIRATORY SYNDROME CORONAVIRUS 2 (SARS-COV-2) (CORONAVIRUS DISEASE [COVID-19]), AMPLIFIED PROBE TECHNIQUE, MAKING USE OF HIGH THROUGHPUT TECHNOLOGIES AS DESCRIBED BY CMS-2020-01-R: HCPCS

## 2020-10-13 LAB — SARS-COV-2 RNA RESP QL NAA+PROBE: NOT DETECTED

## 2020-10-19 ENCOUNTER — LAB VISIT (OUTPATIENT)
Dept: LAB | Facility: OTHER | Age: 60
End: 2020-10-19
Payer: MEDICAID

## 2020-10-19 DIAGNOSIS — Z03.818 ENCOUNTER FOR OBSERVATION FOR SUSPECTED EXPOSURE TO OTHER BIOLOGICAL AGENTS RULED OUT: ICD-10-CM

## 2020-10-19 PROCEDURE — U0003 INFECTIOUS AGENT DETECTION BY NUCLEIC ACID (DNA OR RNA); SEVERE ACUTE RESPIRATORY SYNDROME CORONAVIRUS 2 (SARS-COV-2) (CORONAVIRUS DISEASE [COVID-19]), AMPLIFIED PROBE TECHNIQUE, MAKING USE OF HIGH THROUGHPUT TECHNOLOGIES AS DESCRIBED BY CMS-2020-01-R: HCPCS

## 2020-10-20 LAB — SARS-COV-2 RNA RESP QL NAA+PROBE: NOT DETECTED

## 2020-10-26 ENCOUNTER — LAB VISIT (OUTPATIENT)
Dept: LAB | Facility: OTHER | Age: 60
End: 2020-10-26
Payer: MEDICAID

## 2020-10-26 DIAGNOSIS — Z03.818 ENCOUNTER FOR OBSERVATION FOR SUSPECTED EXPOSURE TO OTHER BIOLOGICAL AGENTS RULED OUT: ICD-10-CM

## 2020-10-26 PROCEDURE — U0003 INFECTIOUS AGENT DETECTION BY NUCLEIC ACID (DNA OR RNA); SEVERE ACUTE RESPIRATORY SYNDROME CORONAVIRUS 2 (SARS-COV-2) (CORONAVIRUS DISEASE [COVID-19]), AMPLIFIED PROBE TECHNIQUE, MAKING USE OF HIGH THROUGHPUT TECHNOLOGIES AS DESCRIBED BY CMS-2020-01-R: HCPCS

## 2020-10-27 LAB — SARS-COV-2 RNA RESP QL NAA+PROBE: NOT DETECTED

## 2020-11-02 ENCOUNTER — LAB VISIT (OUTPATIENT)
Dept: LAB | Facility: OTHER | Age: 60
End: 2020-11-02
Payer: MEDICAID

## 2020-11-02 DIAGNOSIS — Z03.818 ENCOUNTER FOR OBSERVATION FOR SUSPECTED EXPOSURE TO OTHER BIOLOGICAL AGENTS RULED OUT: ICD-10-CM

## 2020-11-02 PROCEDURE — U0003 INFECTIOUS AGENT DETECTION BY NUCLEIC ACID (DNA OR RNA); SEVERE ACUTE RESPIRATORY SYNDROME CORONAVIRUS 2 (SARS-COV-2) (CORONAVIRUS DISEASE [COVID-19]), AMPLIFIED PROBE TECHNIQUE, MAKING USE OF HIGH THROUGHPUT TECHNOLOGIES AS DESCRIBED BY CMS-2020-01-R: HCPCS

## 2020-11-04 LAB — SARS-COV-2 RNA RESP QL NAA+PROBE: NOT DETECTED

## 2020-11-09 ENCOUNTER — LAB VISIT (OUTPATIENT)
Dept: LAB | Facility: OTHER | Age: 60
End: 2020-11-09
Payer: MEDICAID

## 2020-11-09 DIAGNOSIS — Z03.818 ENCOUNTER FOR OBSERVATION FOR SUSPECTED EXPOSURE TO OTHER BIOLOGICAL AGENTS RULED OUT: ICD-10-CM

## 2020-11-09 PROCEDURE — U0003 INFECTIOUS AGENT DETECTION BY NUCLEIC ACID (DNA OR RNA); SEVERE ACUTE RESPIRATORY SYNDROME CORONAVIRUS 2 (SARS-COV-2) (CORONAVIRUS DISEASE [COVID-19]), AMPLIFIED PROBE TECHNIQUE, MAKING USE OF HIGH THROUGHPUT TECHNOLOGIES AS DESCRIBED BY CMS-2020-01-R: HCPCS

## 2020-11-10 LAB — SARS-COV-2 RNA RESP QL NAA+PROBE: NOT DETECTED

## 2020-11-16 ENCOUNTER — LAB VISIT (OUTPATIENT)
Dept: LAB | Facility: OTHER | Age: 60
End: 2020-11-16
Payer: MEDICAID

## 2020-11-16 DIAGNOSIS — Z03.818 ENCOUNTER FOR OBSERVATION FOR SUSPECTED EXPOSURE TO OTHER BIOLOGICAL AGENTS RULED OUT: ICD-10-CM

## 2020-11-16 PROCEDURE — U0003 INFECTIOUS AGENT DETECTION BY NUCLEIC ACID (DNA OR RNA); SEVERE ACUTE RESPIRATORY SYNDROME CORONAVIRUS 2 (SARS-COV-2) (CORONAVIRUS DISEASE [COVID-19]), AMPLIFIED PROBE TECHNIQUE, MAKING USE OF HIGH THROUGHPUT TECHNOLOGIES AS DESCRIBED BY CMS-2020-01-R: HCPCS

## 2020-11-18 LAB — SARS-COV-2 RNA RESP QL NAA+PROBE: NOT DETECTED

## 2020-11-23 ENCOUNTER — LAB VISIT (OUTPATIENT)
Dept: LAB | Facility: OTHER | Age: 60
End: 2020-11-23
Payer: MEDICAID

## 2020-11-23 DIAGNOSIS — Z03.818 ENCOUNTER FOR OBSERVATION FOR SUSPECTED EXPOSURE TO OTHER BIOLOGICAL AGENTS RULED OUT: ICD-10-CM

## 2020-11-23 PROCEDURE — U0003 INFECTIOUS AGENT DETECTION BY NUCLEIC ACID (DNA OR RNA); SEVERE ACUTE RESPIRATORY SYNDROME CORONAVIRUS 2 (SARS-COV-2) (CORONAVIRUS DISEASE [COVID-19]), AMPLIFIED PROBE TECHNIQUE, MAKING USE OF HIGH THROUGHPUT TECHNOLOGIES AS DESCRIBED BY CMS-2020-01-R: HCPCS

## 2020-11-24 LAB — SARS-COV-2 RNA RESP QL NAA+PROBE: NOT DETECTED

## 2020-11-30 ENCOUNTER — LAB VISIT (OUTPATIENT)
Dept: LAB | Facility: OTHER | Age: 60
End: 2020-11-30
Payer: MEDICAID

## 2020-11-30 DIAGNOSIS — Z03.818 ENCOUNTER FOR OBSERVATION FOR SUSPECTED EXPOSURE TO OTHER BIOLOGICAL AGENTS RULED OUT: ICD-10-CM

## 2020-11-30 PROCEDURE — U0003 INFECTIOUS AGENT DETECTION BY NUCLEIC ACID (DNA OR RNA); SEVERE ACUTE RESPIRATORY SYNDROME CORONAVIRUS 2 (SARS-COV-2) (CORONAVIRUS DISEASE [COVID-19]), AMPLIFIED PROBE TECHNIQUE, MAKING USE OF HIGH THROUGHPUT TECHNOLOGIES AS DESCRIBED BY CMS-2020-01-R: HCPCS

## 2020-12-02 LAB — SARS-COV-2 RNA RESP QL NAA+PROBE: NOT DETECTED

## 2020-12-07 ENCOUNTER — LAB VISIT (OUTPATIENT)
Dept: LAB | Facility: OTHER | Age: 60
End: 2020-12-07
Payer: MEDICAID

## 2020-12-07 DIAGNOSIS — Z03.818 ENCOUNTER FOR OBSERVATION FOR SUSPECTED EXPOSURE TO OTHER BIOLOGICAL AGENTS RULED OUT: ICD-10-CM

## 2020-12-07 PROCEDURE — U0003 INFECTIOUS AGENT DETECTION BY NUCLEIC ACID (DNA OR RNA); SEVERE ACUTE RESPIRATORY SYNDROME CORONAVIRUS 2 (SARS-COV-2) (CORONAVIRUS DISEASE [COVID-19]), AMPLIFIED PROBE TECHNIQUE, MAKING USE OF HIGH THROUGHPUT TECHNOLOGIES AS DESCRIBED BY CMS-2020-01-R: HCPCS

## 2020-12-09 LAB — SARS-COV-2 RNA RESP QL NAA+PROBE: NOT DETECTED

## 2020-12-14 ENCOUNTER — LAB VISIT (OUTPATIENT)
Dept: LAB | Facility: OTHER | Age: 60
End: 2020-12-14
Payer: MEDICAID

## 2020-12-14 DIAGNOSIS — Z03.818 ENCOUNTER FOR OBSERVATION FOR SUSPECTED EXPOSURE TO OTHER BIOLOGICAL AGENTS RULED OUT: ICD-10-CM

## 2020-12-14 PROCEDURE — U0003 INFECTIOUS AGENT DETECTION BY NUCLEIC ACID (DNA OR RNA); SEVERE ACUTE RESPIRATORY SYNDROME CORONAVIRUS 2 (SARS-COV-2) (CORONAVIRUS DISEASE [COVID-19]), AMPLIFIED PROBE TECHNIQUE, MAKING USE OF HIGH THROUGHPUT TECHNOLOGIES AS DESCRIBED BY CMS-2020-01-R: HCPCS

## 2020-12-16 LAB — SARS-COV-2 RNA RESP QL NAA+PROBE: NOT DETECTED

## 2020-12-24 ENCOUNTER — LAB VISIT (OUTPATIENT)
Dept: LAB | Facility: OTHER | Age: 60
End: 2020-12-24
Payer: MEDICAID

## 2020-12-24 DIAGNOSIS — Z03.818 ENCOUNTER FOR OBSERVATION FOR SUSPECTED EXPOSURE TO OTHER BIOLOGICAL AGENTS RULED OUT: ICD-10-CM

## 2020-12-24 PROCEDURE — U0003 INFECTIOUS AGENT DETECTION BY NUCLEIC ACID (DNA OR RNA); SEVERE ACUTE RESPIRATORY SYNDROME CORONAVIRUS 2 (SARS-COV-2) (CORONAVIRUS DISEASE [COVID-19]), AMPLIFIED PROBE TECHNIQUE, MAKING USE OF HIGH THROUGHPUT TECHNOLOGIES AS DESCRIBED BY CMS-2020-01-R: HCPCS

## 2020-12-25 LAB — SARS-COV-2 RNA RESP QL NAA+PROBE: NOT DETECTED

## 2020-12-31 ENCOUNTER — LAB VISIT (OUTPATIENT)
Dept: LAB | Facility: OTHER | Age: 60
End: 2020-12-31
Payer: MEDICAID

## 2020-12-31 DIAGNOSIS — Z03.818 ENCOUNTER FOR OBSERVATION FOR SUSPECTED EXPOSURE TO OTHER BIOLOGICAL AGENTS RULED OUT: ICD-10-CM

## 2020-12-31 PROCEDURE — U0003 INFECTIOUS AGENT DETECTION BY NUCLEIC ACID (DNA OR RNA); SEVERE ACUTE RESPIRATORY SYNDROME CORONAVIRUS 2 (SARS-COV-2) (CORONAVIRUS DISEASE [COVID-19]), AMPLIFIED PROBE TECHNIQUE, MAKING USE OF HIGH THROUGHPUT TECHNOLOGIES AS DESCRIBED BY CMS-2020-01-R: HCPCS

## 2021-01-01 LAB — SARS-COV-2 RNA RESP QL NAA+PROBE: NOT DETECTED

## 2021-01-04 ENCOUNTER — LAB VISIT (OUTPATIENT)
Dept: LAB | Facility: OTHER | Age: 61
End: 2021-01-04
Payer: MEDICAID

## 2021-01-04 DIAGNOSIS — Z03.818 ENCOUNTER FOR OBSERVATION FOR SUSPECTED EXPOSURE TO OTHER BIOLOGICAL AGENTS RULED OUT: ICD-10-CM

## 2021-01-04 PROCEDURE — U0003 INFECTIOUS AGENT DETECTION BY NUCLEIC ACID (DNA OR RNA); SEVERE ACUTE RESPIRATORY SYNDROME CORONAVIRUS 2 (SARS-COV-2) (CORONAVIRUS DISEASE [COVID-19]), AMPLIFIED PROBE TECHNIQUE, MAKING USE OF HIGH THROUGHPUT TECHNOLOGIES AS DESCRIBED BY CMS-2020-01-R: HCPCS

## 2021-01-05 LAB — SARS-COV-2 RNA RESP QL NAA+PROBE: NOT DETECTED

## 2021-01-11 ENCOUNTER — LAB VISIT (OUTPATIENT)
Dept: LAB | Facility: OTHER | Age: 61
End: 2021-01-11
Payer: MEDICAID

## 2021-01-11 DIAGNOSIS — Z20.822 ENCOUNTER FOR LABORATORY TESTING FOR COVID-19 VIRUS: ICD-10-CM

## 2021-01-11 PROCEDURE — U0003 INFECTIOUS AGENT DETECTION BY NUCLEIC ACID (DNA OR RNA); SEVERE ACUTE RESPIRATORY SYNDROME CORONAVIRUS 2 (SARS-COV-2) (CORONAVIRUS DISEASE [COVID-19]), AMPLIFIED PROBE TECHNIQUE, MAKING USE OF HIGH THROUGHPUT TECHNOLOGIES AS DESCRIBED BY CMS-2020-01-R: HCPCS

## 2021-01-13 LAB — SARS-COV-2 RNA RESP QL NAA+PROBE: NOT DETECTED

## 2021-01-18 ENCOUNTER — LAB VISIT (OUTPATIENT)
Dept: LAB | Facility: OTHER | Age: 61
End: 2021-01-18
Payer: MEDICAID

## 2021-01-18 DIAGNOSIS — Z20.822 ENCOUNTER FOR LABORATORY TESTING FOR COVID-19 VIRUS: ICD-10-CM

## 2021-01-18 PROCEDURE — U0003 INFECTIOUS AGENT DETECTION BY NUCLEIC ACID (DNA OR RNA); SEVERE ACUTE RESPIRATORY SYNDROME CORONAVIRUS 2 (SARS-COV-2) (CORONAVIRUS DISEASE [COVID-19]), AMPLIFIED PROBE TECHNIQUE, MAKING USE OF HIGH THROUGHPUT TECHNOLOGIES AS DESCRIBED BY CMS-2020-01-R: HCPCS

## 2021-01-19 LAB — SARS-COV-2 RNA RESP QL NAA+PROBE: NOT DETECTED

## 2021-01-25 ENCOUNTER — LAB VISIT (OUTPATIENT)
Dept: LAB | Facility: OTHER | Age: 61
End: 2021-01-25
Payer: MEDICAID

## 2021-01-25 DIAGNOSIS — Z20.822 ENCOUNTER FOR LABORATORY TESTING FOR COVID-19 VIRUS: ICD-10-CM

## 2021-01-25 PROCEDURE — U0003 INFECTIOUS AGENT DETECTION BY NUCLEIC ACID (DNA OR RNA); SEVERE ACUTE RESPIRATORY SYNDROME CORONAVIRUS 2 (SARS-COV-2) (CORONAVIRUS DISEASE [COVID-19]), AMPLIFIED PROBE TECHNIQUE, MAKING USE OF HIGH THROUGHPUT TECHNOLOGIES AS DESCRIBED BY CMS-2020-01-R: HCPCS

## 2021-01-26 LAB — SARS-COV-2 RNA RESP QL NAA+PROBE: NOT DETECTED

## 2021-02-01 ENCOUNTER — LAB VISIT (OUTPATIENT)
Dept: LAB | Facility: OTHER | Age: 61
End: 2021-02-01
Payer: MEDICAID

## 2021-02-01 DIAGNOSIS — Z20.822 ENCOUNTER FOR LABORATORY TESTING FOR COVID-19 VIRUS: ICD-10-CM

## 2021-02-01 PROCEDURE — U0003 INFECTIOUS AGENT DETECTION BY NUCLEIC ACID (DNA OR RNA); SEVERE ACUTE RESPIRATORY SYNDROME CORONAVIRUS 2 (SARS-COV-2) (CORONAVIRUS DISEASE [COVID-19]), AMPLIFIED PROBE TECHNIQUE, MAKING USE OF HIGH THROUGHPUT TECHNOLOGIES AS DESCRIBED BY CMS-2020-01-R: HCPCS

## 2021-02-02 LAB — SARS-COV-2 RNA RESP QL NAA+PROBE: NOT DETECTED

## 2021-02-08 ENCOUNTER — LAB VISIT (OUTPATIENT)
Dept: LAB | Facility: OTHER | Age: 61
End: 2021-02-08
Payer: MEDICAID

## 2021-02-08 DIAGNOSIS — Z20.822 ENCOUNTER FOR LABORATORY TESTING FOR COVID-19 VIRUS: ICD-10-CM

## 2021-02-08 PROCEDURE — U0003 INFECTIOUS AGENT DETECTION BY NUCLEIC ACID (DNA OR RNA); SEVERE ACUTE RESPIRATORY SYNDROME CORONAVIRUS 2 (SARS-COV-2) (CORONAVIRUS DISEASE [COVID-19]), AMPLIFIED PROBE TECHNIQUE, MAKING USE OF HIGH THROUGHPUT TECHNOLOGIES AS DESCRIBED BY CMS-2020-01-R: HCPCS

## 2021-02-09 LAB — SARS-COV-2 RNA RESP QL NAA+PROBE: NOT DETECTED

## 2021-02-15 ENCOUNTER — LAB VISIT (OUTPATIENT)
Dept: LAB | Facility: OTHER | Age: 61
End: 2021-02-15
Payer: MEDICAID

## 2021-02-15 DIAGNOSIS — Z20.822 ENCOUNTER FOR LABORATORY TESTING FOR COVID-19 VIRUS: ICD-10-CM

## 2021-02-15 PROCEDURE — U0003 INFECTIOUS AGENT DETECTION BY NUCLEIC ACID (DNA OR RNA); SEVERE ACUTE RESPIRATORY SYNDROME CORONAVIRUS 2 (SARS-COV-2) (CORONAVIRUS DISEASE [COVID-19]), AMPLIFIED PROBE TECHNIQUE, MAKING USE OF HIGH THROUGHPUT TECHNOLOGIES AS DESCRIBED BY CMS-2020-01-R: HCPCS

## 2021-02-17 LAB — SARS-COV-2 RNA RESP QL NAA+PROBE: NOT DETECTED

## 2021-02-22 ENCOUNTER — LAB VISIT (OUTPATIENT)
Dept: LAB | Facility: OTHER | Age: 61
End: 2021-02-22
Payer: MEDICAID

## 2021-02-22 DIAGNOSIS — Z20.822 ENCOUNTER FOR LABORATORY TESTING FOR COVID-19 VIRUS: ICD-10-CM

## 2021-02-22 PROCEDURE — U0003 INFECTIOUS AGENT DETECTION BY NUCLEIC ACID (DNA OR RNA); SEVERE ACUTE RESPIRATORY SYNDROME CORONAVIRUS 2 (SARS-COV-2) (CORONAVIRUS DISEASE [COVID-19]), AMPLIFIED PROBE TECHNIQUE, MAKING USE OF HIGH THROUGHPUT TECHNOLOGIES AS DESCRIBED BY CMS-2020-01-R: HCPCS

## 2021-02-23 LAB — SARS-COV-2 RNA RESP QL NAA+PROBE: NOT DETECTED

## 2021-03-08 ENCOUNTER — LAB VISIT (OUTPATIENT)
Dept: LAB | Facility: OTHER | Age: 61
End: 2021-03-08
Payer: MEDICAID

## 2021-03-08 DIAGNOSIS — Z20.822 ENCOUNTER FOR LABORATORY TESTING FOR COVID-19 VIRUS: ICD-10-CM

## 2021-03-08 PROCEDURE — U0003 INFECTIOUS AGENT DETECTION BY NUCLEIC ACID (DNA OR RNA); SEVERE ACUTE RESPIRATORY SYNDROME CORONAVIRUS 2 (SARS-COV-2) (CORONAVIRUS DISEASE [COVID-19]), AMPLIFIED PROBE TECHNIQUE, MAKING USE OF HIGH THROUGHPUT TECHNOLOGIES AS DESCRIBED BY CMS-2020-01-R: HCPCS | Performed by: NURSE PRACTITIONER

## 2021-03-09 LAB — SARS-COV-2 RNA RESP QL NAA+PROBE: NOT DETECTED

## 2021-03-29 ENCOUNTER — LAB VISIT (OUTPATIENT)
Dept: LAB | Facility: OTHER | Age: 61
End: 2021-03-29
Payer: MEDICAID

## 2021-03-29 DIAGNOSIS — Z20.822 ENCOUNTER FOR LABORATORY TESTING FOR COVID-19 VIRUS: ICD-10-CM

## 2021-03-29 PROCEDURE — U0003 INFECTIOUS AGENT DETECTION BY NUCLEIC ACID (DNA OR RNA); SEVERE ACUTE RESPIRATORY SYNDROME CORONAVIRUS 2 (SARS-COV-2) (CORONAVIRUS DISEASE [COVID-19]), AMPLIFIED PROBE TECHNIQUE, MAKING USE OF HIGH THROUGHPUT TECHNOLOGIES AS DESCRIBED BY CMS-2020-01-R: HCPCS | Performed by: NURSE PRACTITIONER

## 2021-03-30 LAB — SARS-COV-2 RNA RESP QL NAA+PROBE: NOT DETECTED

## 2021-04-05 ENCOUNTER — LAB VISIT (OUTPATIENT)
Dept: LAB | Facility: OTHER | Age: 61
End: 2021-04-05
Payer: MEDICAID

## 2021-04-05 DIAGNOSIS — Z20.822 ENCOUNTER FOR LABORATORY TESTING FOR COVID-19 VIRUS: ICD-10-CM

## 2021-04-05 PROCEDURE — U0003 INFECTIOUS AGENT DETECTION BY NUCLEIC ACID (DNA OR RNA); SEVERE ACUTE RESPIRATORY SYNDROME CORONAVIRUS 2 (SARS-COV-2) (CORONAVIRUS DISEASE [COVID-19]), AMPLIFIED PROBE TECHNIQUE, MAKING USE OF HIGH THROUGHPUT TECHNOLOGIES AS DESCRIBED BY CMS-2020-01-R: HCPCS | Performed by: NURSE PRACTITIONER

## 2021-04-07 LAB — SARS-COV-2 RNA RESP QL NAA+PROBE: NOT DETECTED

## 2021-04-12 ENCOUNTER — LAB VISIT (OUTPATIENT)
Dept: LAB | Facility: OTHER | Age: 61
End: 2021-04-12
Payer: MEDICAID

## 2021-04-12 DIAGNOSIS — Z20.822 ENCOUNTER FOR LABORATORY TESTING FOR COVID-19 VIRUS: ICD-10-CM

## 2021-04-12 PROCEDURE — U0003 INFECTIOUS AGENT DETECTION BY NUCLEIC ACID (DNA OR RNA); SEVERE ACUTE RESPIRATORY SYNDROME CORONAVIRUS 2 (SARS-COV-2) (CORONAVIRUS DISEASE [COVID-19]), AMPLIFIED PROBE TECHNIQUE, MAKING USE OF HIGH THROUGHPUT TECHNOLOGIES AS DESCRIBED BY CMS-2020-01-R: HCPCS | Performed by: NURSE PRACTITIONER

## 2021-04-13 LAB — SARS-COV-2 RNA RESP QL NAA+PROBE: NOT DETECTED

## 2021-04-26 ENCOUNTER — LAB VISIT (OUTPATIENT)
Dept: LAB | Facility: OTHER | Age: 61
End: 2021-04-26
Payer: MEDICAID

## 2021-04-26 DIAGNOSIS — Z20.822 ENCOUNTER FOR LABORATORY TESTING FOR COVID-19 VIRUS: ICD-10-CM

## 2021-04-26 PROCEDURE — U0003 INFECTIOUS AGENT DETECTION BY NUCLEIC ACID (DNA OR RNA); SEVERE ACUTE RESPIRATORY SYNDROME CORONAVIRUS 2 (SARS-COV-2) (CORONAVIRUS DISEASE [COVID-19]), AMPLIFIED PROBE TECHNIQUE, MAKING USE OF HIGH THROUGHPUT TECHNOLOGIES AS DESCRIBED BY CMS-2020-01-R: HCPCS | Performed by: NURSE PRACTITIONER

## 2021-04-27 LAB — SARS-COV-2 RNA RESP QL NAA+PROBE: NOT DETECTED

## 2021-05-03 ENCOUNTER — LAB VISIT (OUTPATIENT)
Dept: LAB | Facility: OTHER | Age: 61
End: 2021-05-03
Payer: MEDICAID

## 2021-05-03 DIAGNOSIS — Z20.822 ENCOUNTER FOR LABORATORY TESTING FOR COVID-19 VIRUS: ICD-10-CM

## 2021-05-03 PROCEDURE — U0003 INFECTIOUS AGENT DETECTION BY NUCLEIC ACID (DNA OR RNA); SEVERE ACUTE RESPIRATORY SYNDROME CORONAVIRUS 2 (SARS-COV-2) (CORONAVIRUS DISEASE [COVID-19]), AMPLIFIED PROBE TECHNIQUE, MAKING USE OF HIGH THROUGHPUT TECHNOLOGIES AS DESCRIBED BY CMS-2020-01-R: HCPCS | Performed by: NURSE PRACTITIONER

## 2021-05-04 LAB — SARS-COV-2 RNA RESP QL NAA+PROBE: NOT DETECTED

## 2021-05-10 ENCOUNTER — LAB VISIT (OUTPATIENT)
Dept: LAB | Facility: OTHER | Age: 61
End: 2021-05-10
Payer: MEDICAID

## 2021-05-10 DIAGNOSIS — Z20.822 ENCOUNTER FOR LABORATORY TESTING FOR COVID-19 VIRUS: ICD-10-CM

## 2021-05-10 PROCEDURE — U0003 INFECTIOUS AGENT DETECTION BY NUCLEIC ACID (DNA OR RNA); SEVERE ACUTE RESPIRATORY SYNDROME CORONAVIRUS 2 (SARS-COV-2) (CORONAVIRUS DISEASE [COVID-19]), AMPLIFIED PROBE TECHNIQUE, MAKING USE OF HIGH THROUGHPUT TECHNOLOGIES AS DESCRIBED BY CMS-2020-01-R: HCPCS | Performed by: NURSE PRACTITIONER

## 2021-05-12 LAB — SARS-COV-2 RNA RESP QL NAA+PROBE: NOT DETECTED

## 2021-05-17 ENCOUNTER — LAB VISIT (OUTPATIENT)
Dept: LAB | Facility: OTHER | Age: 61
End: 2021-05-17
Payer: MEDICAID

## 2021-05-17 DIAGNOSIS — Z20.822 ENCOUNTER FOR LABORATORY TESTING FOR COVID-19 VIRUS: ICD-10-CM

## 2021-05-17 PROCEDURE — U0003 INFECTIOUS AGENT DETECTION BY NUCLEIC ACID (DNA OR RNA); SEVERE ACUTE RESPIRATORY SYNDROME CORONAVIRUS 2 (SARS-COV-2) (CORONAVIRUS DISEASE [COVID-19]), AMPLIFIED PROBE TECHNIQUE, MAKING USE OF HIGH THROUGHPUT TECHNOLOGIES AS DESCRIBED BY CMS-2020-01-R: HCPCS | Performed by: NURSE PRACTITIONER

## 2021-05-18 LAB — SARS-COV-2 RNA RESP QL NAA+PROBE: NOT DETECTED

## 2021-06-14 ENCOUNTER — LAB VISIT (OUTPATIENT)
Dept: LAB | Facility: OTHER | Age: 61
End: 2021-06-14
Payer: MEDICAID

## 2021-06-14 DIAGNOSIS — Z20.822 ENCOUNTER FOR LABORATORY TESTING FOR COVID-19 VIRUS: ICD-10-CM

## 2021-06-14 PROCEDURE — U0003 INFECTIOUS AGENT DETECTION BY NUCLEIC ACID (DNA OR RNA); SEVERE ACUTE RESPIRATORY SYNDROME CORONAVIRUS 2 (SARS-COV-2) (CORONAVIRUS DISEASE [COVID-19]), AMPLIFIED PROBE TECHNIQUE, MAKING USE OF HIGH THROUGHPUT TECHNOLOGIES AS DESCRIBED BY CMS-2020-01-R: HCPCS | Performed by: NURSE PRACTITIONER

## 2021-06-15 LAB — SARS-COV-2 RNA RESP QL NAA+PROBE: NOT DETECTED

## 2021-07-05 ENCOUNTER — LAB VISIT (OUTPATIENT)
Dept: LAB | Facility: OTHER | Age: 61
End: 2021-07-05
Payer: MEDICAID

## 2021-07-05 DIAGNOSIS — Z20.822 ENCOUNTER FOR LABORATORY TESTING FOR COVID-19 VIRUS: ICD-10-CM

## 2021-07-05 PROCEDURE — U0003 INFECTIOUS AGENT DETECTION BY NUCLEIC ACID (DNA OR RNA); SEVERE ACUTE RESPIRATORY SYNDROME CORONAVIRUS 2 (SARS-COV-2) (CORONAVIRUS DISEASE [COVID-19]), AMPLIFIED PROBE TECHNIQUE, MAKING USE OF HIGH THROUGHPUT TECHNOLOGIES AS DESCRIBED BY CMS-2020-01-R: HCPCS | Performed by: NURSE PRACTITIONER

## 2021-07-06 LAB — SARS-COV-2 RNA RESP QL NAA+PROBE: NOT DETECTED

## 2021-07-26 ENCOUNTER — LAB VISIT (OUTPATIENT)
Dept: LAB | Facility: OTHER | Age: 61
End: 2021-07-26
Payer: MEDICAID

## 2021-07-26 DIAGNOSIS — Z20.822 ENCOUNTER FOR LABORATORY TESTING FOR COVID-19 VIRUS: ICD-10-CM

## 2021-07-26 PROCEDURE — U0003 INFECTIOUS AGENT DETECTION BY NUCLEIC ACID (DNA OR RNA); SEVERE ACUTE RESPIRATORY SYNDROME CORONAVIRUS 2 (SARS-COV-2) (CORONAVIRUS DISEASE [COVID-19]), AMPLIFIED PROBE TECHNIQUE, MAKING USE OF HIGH THROUGHPUT TECHNOLOGIES AS DESCRIBED BY CMS-2020-01-R: HCPCS | Performed by: NURSE PRACTITIONER

## 2021-07-27 LAB
SARS-COV-2 RNA RESP QL NAA+PROBE: NOT DETECTED
SARS-COV-2- CYCLE NUMBER: -1

## 2021-08-02 ENCOUNTER — LAB VISIT (OUTPATIENT)
Dept: LAB | Facility: OTHER | Age: 61
End: 2021-08-02
Payer: MEDICAID

## 2021-08-02 DIAGNOSIS — Z20.822 ENCOUNTER FOR LABORATORY TESTING FOR COVID-19 VIRUS: ICD-10-CM

## 2021-08-02 PROCEDURE — U0003 INFECTIOUS AGENT DETECTION BY NUCLEIC ACID (DNA OR RNA); SEVERE ACUTE RESPIRATORY SYNDROME CORONAVIRUS 2 (SARS-COV-2) (CORONAVIRUS DISEASE [COVID-19]), AMPLIFIED PROBE TECHNIQUE, MAKING USE OF HIGH THROUGHPUT TECHNOLOGIES AS DESCRIBED BY CMS-2020-01-R: HCPCS | Performed by: NURSE PRACTITIONER

## 2021-08-03 LAB
SARS-COV-2 RNA RESP QL NAA+PROBE: NOT DETECTED
SARS-COV-2- CYCLE NUMBER: -1

## 2021-08-09 ENCOUNTER — LAB VISIT (OUTPATIENT)
Dept: LAB | Facility: OTHER | Age: 61
End: 2021-08-09
Payer: MEDICAID

## 2021-08-09 DIAGNOSIS — Z20.822 ENCOUNTER FOR LABORATORY TESTING FOR COVID-19 VIRUS: ICD-10-CM

## 2021-08-09 PROCEDURE — U0003 INFECTIOUS AGENT DETECTION BY NUCLEIC ACID (DNA OR RNA); SEVERE ACUTE RESPIRATORY SYNDROME CORONAVIRUS 2 (SARS-COV-2) (CORONAVIRUS DISEASE [COVID-19]), AMPLIFIED PROBE TECHNIQUE, MAKING USE OF HIGH THROUGHPUT TECHNOLOGIES AS DESCRIBED BY CMS-2020-01-R: HCPCS | Performed by: NURSE PRACTITIONER

## 2021-08-11 LAB
SARS-COV-2 RNA RESP QL NAA+PROBE: NOT DETECTED
SARS-COV-2- CYCLE NUMBER: -1

## 2021-08-16 ENCOUNTER — LAB VISIT (OUTPATIENT)
Dept: LAB | Facility: OTHER | Age: 61
End: 2021-08-16
Payer: MEDICAID

## 2021-08-16 DIAGNOSIS — Z20.822 ENCOUNTER FOR LABORATORY TESTING FOR COVID-19 VIRUS: ICD-10-CM

## 2021-08-16 PROCEDURE — U0003 INFECTIOUS AGENT DETECTION BY NUCLEIC ACID (DNA OR RNA); SEVERE ACUTE RESPIRATORY SYNDROME CORONAVIRUS 2 (SARS-COV-2) (CORONAVIRUS DISEASE [COVID-19]), AMPLIFIED PROBE TECHNIQUE, MAKING USE OF HIGH THROUGHPUT TECHNOLOGIES AS DESCRIBED BY CMS-2020-01-R: HCPCS | Performed by: NURSE PRACTITIONER

## 2021-08-18 LAB
SARS-COV-2 RNA RESP QL NAA+PROBE: NOT DETECTED
SARS-COV-2- CYCLE NUMBER: -1

## 2021-08-23 ENCOUNTER — LAB VISIT (OUTPATIENT)
Dept: LAB | Facility: OTHER | Age: 61
End: 2021-08-23
Payer: MEDICAID

## 2021-08-23 DIAGNOSIS — Z20.822 ENCOUNTER FOR LABORATORY TESTING FOR COVID-19 VIRUS: ICD-10-CM

## 2021-08-23 PROCEDURE — U0003 INFECTIOUS AGENT DETECTION BY NUCLEIC ACID (DNA OR RNA); SEVERE ACUTE RESPIRATORY SYNDROME CORONAVIRUS 2 (SARS-COV-2) (CORONAVIRUS DISEASE [COVID-19]), AMPLIFIED PROBE TECHNIQUE, MAKING USE OF HIGH THROUGHPUT TECHNOLOGIES AS DESCRIBED BY CMS-2020-01-R: HCPCS | Performed by: NURSE PRACTITIONER

## 2021-08-26 LAB — SARS-COV-2 RNA RESP QL NAA+PROBE: NOT DETECTED

## 2023-04-22 PROBLEM — E11.9 DM (DIABETES MELLITUS): Status: ACTIVE | Noted: 2023-04-22

## 2023-04-22 PROBLEM — I10 HTN (HYPERTENSION): Status: ACTIVE | Noted: 2023-04-22

## 2023-04-22 PROBLEM — E11.628 TYPE 2 DIABETES MELLITUS WITH RIGHT DIABETIC FOOT INFECTION: Status: RESOLVED | Noted: 2018-02-24 | Resolved: 2023-04-22

## 2023-04-22 PROBLEM — L08.9 TYPE 2 DIABETES MELLITUS WITH RIGHT DIABETIC FOOT INFECTION: Status: RESOLVED | Noted: 2018-02-24 | Resolved: 2023-04-22

## 2023-04-23 PROBLEM — E87.0 HYPERNATREMIA: Status: ACTIVE | Noted: 2023-04-23

## 2023-04-23 PROBLEM — Z71.89 ACP (ADVANCE CARE PLANNING): Status: ACTIVE | Noted: 2023-04-23

## 2023-04-24 PROBLEM — I63.9 CVA (CEREBRAL VASCULAR ACCIDENT): Status: ACTIVE | Noted: 2023-04-24

## 2023-04-25 PROBLEM — R45.89 DEPRESSED MOOD: Status: ACTIVE | Noted: 2023-04-25

## 2023-04-26 PROBLEM — R60.9 PAROTID SWELLING: Status: ACTIVE | Noted: 2023-04-26

## 2023-04-28 PROBLEM — E11.65 TYPE 2 DIABETES MELLITUS WITH HYPERGLYCEMIA, WITHOUT LONG-TERM CURRENT USE OF INSULIN: Status: ACTIVE | Noted: 2023-04-22

## 2023-04-28 PROBLEM — B95.62 MRSA BACTEREMIA: Status: ACTIVE | Noted: 2018-12-24

## 2023-04-28 PROBLEM — R78.81 MRSA BACTEREMIA: Status: ACTIVE | Noted: 2018-12-24

## 2023-04-30 PROBLEM — I50.22 CHRONIC SYSTOLIC HEART FAILURE: Status: ACTIVE | Noted: 2023-04-30

## 2023-05-01 PROBLEM — K92.2 UGIB (UPPER GASTROINTESTINAL BLEED): Status: ACTIVE | Noted: 2023-05-01

## 2023-05-01 PROBLEM — R33.9 URINARY RETENTION: Status: ACTIVE | Noted: 2023-05-01

## 2023-08-07 PROBLEM — K92.2 UGIB (UPPER GASTROINTESTINAL BLEED): Status: RESOLVED | Noted: 2023-05-01 | Resolved: 2023-08-07

## 2023-08-07 PROBLEM — I63.9 CVA (CEREBRAL VASCULAR ACCIDENT): Status: RESOLVED | Noted: 2023-04-24 | Resolved: 2023-08-07

## (undated) DEVICE — SOL BETADINE 5%

## (undated) DEVICE — PULSAVAC ZIMMER

## (undated) DEVICE — BANDAGE DERMACEA STRETCH 4X1IN

## (undated) DEVICE — UNDERGLOVES BIOGEL PI SIZE 8

## (undated) DEVICE — LINER GLOVE POWDERFREE SZ 7.5

## (undated) DEVICE — ELECTRODE REM PLYHSV RETURN 9

## (undated) DEVICE — GAUZE SPONGE 4X4 12PLY

## (undated) DEVICE — NDL 27G X 1 1/4

## (undated) DEVICE — SYS LABLNG CORECT MED 4 FLG

## (undated) DEVICE — SYR 10CC LUER LOCK

## (undated) DEVICE — SUT 2-0 VICRYL / SH (J417)

## (undated) DEVICE — SEE MEDLINE ITEM 152529

## (undated) DEVICE — SEE MEDLINE ITEM 152515

## (undated) DEVICE — COVER OVERHEAD SURG LT BLUE

## (undated) DEVICE — SEE MEDLINE ITEM 152528

## (undated) DEVICE — BLANKET UPPER BODY 78.7X29.9IN

## (undated) DEVICE — SUPPORT ULNA NERVE PROTECTOR

## (undated) DEVICE — SOL IRR NACL .9% 1000CC

## (undated) DEVICE — SEE L#120831

## (undated) DEVICE — DRESSING N ADH OIL EMUL 3X3

## (undated) DEVICE — SUT 3-0 VICRYL / SH (J416)

## (undated) DEVICE — SHEET DRAPE FAN-FOLDED 3/4

## (undated) DEVICE — TOURNIQUET SB QC DP 18X4IN

## (undated) DEVICE — PACK ARTHROSCOPY W/ISO BAC

## (undated) DEVICE — SEE MEDLINE ITEM 152523

## (undated) DEVICE — PAD CAST SPECIALIST STRL 4

## (undated) DEVICE — CANISTER SUCTION 2 LTR

## (undated) DEVICE — Device

## (undated) DEVICE — PAD PREP 50/CA

## (undated) DEVICE — SUT PROLENE 3-0 PS-2 BL 18IN

## (undated) DEVICE — SEE MEDLINE ITEM 157117